# Patient Record
Sex: MALE | Race: WHITE | Employment: OTHER | URBAN - METROPOLITAN AREA
[De-identification: names, ages, dates, MRNs, and addresses within clinical notes are randomized per-mention and may not be internally consistent; named-entity substitution may affect disease eponyms.]

---

## 2017-01-23 ENCOUNTER — HOSPITAL ENCOUNTER (EMERGENCY)
Age: 62
Discharge: HOME | End: 2017-01-23
Attending: EMERGENCY MEDICINE
Payer: MEDICARE

## 2017-01-23 VITALS
OXYGEN SATURATION: 100 % | BODY MASS INDEX: 23.63 KG/M2 | TEMPERATURE: 98.7 F | HEART RATE: 84 BPM | RESPIRATION RATE: 14 BRPM | DIASTOLIC BLOOD PRESSURE: 86 MMHG | SYSTOLIC BLOOD PRESSURE: 124 MMHG | WEIGHT: 160 LBS

## 2017-01-23 DIAGNOSIS — F10.920 ALCOHOL INTOXICATION, UNCOMPLICATED (HCC): Primary | ICD-10-CM

## 2017-01-23 LAB
ALBUMIN SERPL BCP-MCNC: 3.6 G/DL (ref 3.4–5)
ALBUMIN/GLOB SERPL: 0.9 {RATIO} (ref 0.8–1.7)
ALP SERPL-CCNC: 75 U/L (ref 45–117)
ALT SERPL-CCNC: 40 U/L (ref 16–61)
AMPHET UR QL SCN: NEGATIVE
ANION GAP BLD CALC-SCNC: 8 MMOL/L (ref 3–18)
APAP SERPL-MCNC: <2 UG/ML (ref 10–30)
AST SERPL W P-5'-P-CCNC: 39 U/L (ref 15–37)
BARBITURATES UR QL SCN: NEGATIVE
BASOPHILS # BLD AUTO: 0.1 K/UL (ref 0–0.06)
BASOPHILS # BLD: 1 % (ref 0–2)
BENZODIAZ UR QL: NEGATIVE
BILIRUB SERPL-MCNC: 0.1 MG/DL (ref 0.2–1)
BUN SERPL-MCNC: 10 MG/DL (ref 7–18)
BUN/CREAT SERPL: 14 (ref 12–20)
CALCIUM SERPL-MCNC: 8.7 MG/DL (ref 8.5–10.1)
CANNABINOIDS UR QL SCN: NEGATIVE
CHLORIDE SERPL-SCNC: 108 MMOL/L (ref 100–108)
CO2 SERPL-SCNC: 28 MMOL/L (ref 21–32)
COCAINE UR QL SCN: NEGATIVE
CREAT SERPL-MCNC: 0.72 MG/DL (ref 0.6–1.3)
DIFFERENTIAL METHOD BLD: ABNORMAL
EOSINOPHIL # BLD: 0.6 K/UL (ref 0–0.4)
EOSINOPHIL NFR BLD: 8 % (ref 0–5)
ERYTHROCYTE [DISTWIDTH] IN BLOOD BY AUTOMATED COUNT: 15.9 % (ref 11.6–14.5)
ETHANOL SERPL-MCNC: 257 MG/DL (ref 0–3)
GLOBULIN SER CALC-MCNC: 3.8 G/DL (ref 2–4)
GLUCOSE SERPL-MCNC: 116 MG/DL (ref 74–99)
HCT VFR BLD AUTO: 51.8 % (ref 36–48)
HDSCOM,HDSCOM: NORMAL
HGB BLD-MCNC: 18.7 G/DL (ref 13–16)
LYMPHOCYTES # BLD AUTO: 23 % (ref 21–52)
LYMPHOCYTES # BLD: 1.7 K/UL (ref 0.9–3.6)
MCH RBC QN AUTO: 32.9 PG (ref 24–34)
MCHC RBC AUTO-ENTMCNC: 36.1 G/DL (ref 31–37)
MCV RBC AUTO: 91 FL (ref 74–97)
METHADONE UR QL: NEGATIVE
MONOCYTES # BLD: 0.4 K/UL (ref 0.05–1.2)
MONOCYTES NFR BLD AUTO: 6 % (ref 3–10)
NEUTS SEG # BLD: 4.5 K/UL (ref 1.8–8)
NEUTS SEG NFR BLD AUTO: 62 % (ref 40–73)
OPIATES UR QL: NEGATIVE
PCP UR QL: NEGATIVE
PLATELET # BLD AUTO: 172 K/UL (ref 135–420)
PMV BLD AUTO: 9.1 FL (ref 9.2–11.8)
POTASSIUM SERPL-SCNC: 3.5 MMOL/L (ref 3.5–5.5)
PROT SERPL-MCNC: 7.4 G/DL (ref 6.4–8.2)
RBC # BLD AUTO: 5.69 M/UL (ref 4.7–5.5)
SALICYLATES SERPL-MCNC: 3.4 MG/DL (ref 2.8–20)
SODIUM SERPL-SCNC: 144 MMOL/L (ref 136–145)
WBC # BLD AUTO: 7.2 K/UL (ref 4.6–13.2)

## 2017-01-23 PROCEDURE — 80307 DRUG TEST PRSMV CHEM ANLYZR: CPT | Performed by: EMERGENCY MEDICINE

## 2017-01-23 PROCEDURE — 80053 COMPREHEN METABOLIC PANEL: CPT | Performed by: EMERGENCY MEDICINE

## 2017-01-23 PROCEDURE — 85025 COMPLETE CBC W/AUTO DIFF WBC: CPT | Performed by: EMERGENCY MEDICINE

## 2017-01-23 PROCEDURE — 99285 EMERGENCY DEPT VISIT HI MDM: CPT

## 2017-01-23 NOTE — DISCHARGE INSTRUCTIONS
Alcohol, Drug, or Poison Ingestion: Care Instructions  Your Care Instructions  A person can become very sick, or die, from swallowing or using alcohol, drugs, or poisons. Alcohol poisoning occurs when a person drinks a large amount of alcohol. Alcohol can stop nerve signals that control breathing. It can also stop the gag reflex that prevents choking. Alcohol poisoning is serious. It can lead to brain damage or death if it's not treated right away. Drugs can be used by accident or on purpose. They can be swallowed, inhaled, injected, or absorbed through the skin. Drugs include over-the-counter medicine (such as aspirin or acetaminophen) and prescription medicine. They also include vitamins and supplements. And they include illegal drugs such as cocaine and heroin. And poisons are all around us. They include household , cosmetics, houseplants, and garden chemicals. The doctor has checked you carefully, but problems can develop later. If you notice any problems or new symptoms, get medical treatment right away. Follow-up care is a key part of your treatment and safety. Be sure to make and go to all appointments, and call your doctor if you are having problems. It's also a good idea to know your test results and keep a list of the medicines you take. How can you care for yourself at home? Alcohol problems  · Talk to your doctor or counselor about programs that can help you stop using alcohol. · Plan ways to avoid being tempted to drink. ¨ Get rid of all alcohol in your home. ¨ Avoid places where you tend to drink. ¨ Stay away from places or events that offer alcohol. ¨ Stay away from people who drink a lot. Drug problems  · Talk to your doctor about programs that can help you stop using drugs. · Get rid of any drugs you might be tempted to misuse. · Learn how to say no when other people use drugs. · Don't spend time with people who use drugs.   Poison prevention  · Keep products in the containers they came in. Keep them with the original labels. · Be careful when you use cleaning products, paints, solvents, and pesticides. Read labels before use. Use a fan to move strong odors and fumes out of your home. · Do not mix cleaning products. Try to use nontoxic . These include vinegar, lemon juice, and baking soda. When should you call for help? Poison control centers, hospitals, or your doctor can give immediate advice in the case of a poisoning. The [facility] number is [phone]. Have the poison container with you so you can give complete information to the poison control center, such as what the poison or substance is, how much was taken and when. Do not try to make the person vomit. Call 911 anytime you think you may need emergency care. For example, call if you or someone else:  · Has used or currently uses alcohol or drugs and is very confused or can't stay awake. · Has passed out (lost consciousness). · Has severe trouble breathing. · Is having a seizure. Call your doctor now or seek immediate medical care if you or someone else:  · Has new symptoms, or is not acting normally. Watch closely for changes in your health, and be sure to contact your doctor if:  · You do not get better as expected. · You need help with drug or alcohol problems. · You have problems with depression or other mental health issues. Where can you learn more? Go to http://darrick-joanne.info/. Enter Y476 in the search box to learn more about \"Alcohol, Drug, or Poison Ingestion: Care Instructions. \"  Current as of: May 27, 2016  Content Version: 11.1  © 2514-0961 [employer]. Care instructions adapted under license by [employer] (which disclaims liability or warranty for this information).  If you have questions about a medical condition or this instruction, always ask your healthcare professional. [employer] disclaims any warranty or liability for your use of this information.

## 2017-01-23 NOTE — ED TRIAGE NOTES
According to EMS someone called the ambulance because the pt apparently called and left a message stated he wanted to hurt himself, or a note that stated he wanted to hurt himself, pt denies SI or HI at this time, does not remember being picked up by EMS, pt just had surgery 1 week ago on right elbow to reattach a tendon. Pt states he took some pain meds last night and drank some alcohol because he missed his wife who passed in 2011. Pt arrived with wet pants, states he has incontinence. Pt just keeps asking for water over and over again and states he can not answer questions without it, will not talk about what happened this morning.

## 2017-01-23 NOTE — ED PROVIDER NOTES
HPI Comments: 7:26 AM Cassidy Preston is a 59 y.o. male who presents to the ED via EMS for SI. Per EMS, patient called his brother in law, and made the comment that he wanted to harm himself. His brother in law then called the patient's sister to come over to the house. When the sister arrived she decided to call 911. She states that the patient had been drinking a good amount of alcohol, and pills of his Oxybutynin were spilled everywhere. Patient states that he does not know why he is in the ED. \"I woke up with a bunch of people in my house, next thing I know I'm on a bumpy ride coming here\". Mentions that when he arrived to the ED \"my pants were wet\", because I have urinary incontinence\". Patient has a history of bladder cancer, HTN, and neuropathy. Patient is noncompliant with his HTN medications, but notes that Tramadol helps with his neuropathy. Patient denies SI or HI. States \"I would like to have my baby with me, I just miss my baby\". Patient is referring to his wife that  in . Reports that he was just having a bad night, and that nothing is wrong. Other sx include R elbow pain secondary to a recent surgery. No further complaints at this time. The history is provided by the patient and a relative.         Past Medical History:   Diagnosis Date    Acute sinusitis, unspecified     Alcohol abuse, unspecified     Bladder cancer ([facility] [address]      chemo & radiation    Cough     Depressive disorder     Hematuria     HLD (hyperlipidemia)     Impotence of organic origin     Insomnia, unspecified     Other abnormal glucose     Other seborrheic keratosis     Pain in joint, shoulder region        Past Surgical History:   Procedure Laterality Date    Hx colonoscopy      Pr cystourethroscopy,biopsies      Pr cystourethroscopy,biopsies  2014     Dr. Mervin Akers Hx urological  13     [facility], TURBT - low-grade stage TA, Dr Wero Cervantes Hx urological  14     [facility], Cysto-TURP, Transurethral resection/multiple bladder biopsies - Papillary Urothelial Hyperplasia, Dr Rob Person Hx urological  1/29/15     Dr. Taylor Morales - clot evacuation/fulguration         Family History:   Problem Relation Age of Onset    Hypertension Mother     Kidney Disease Father     Heart Disease Father     Diabetes Sister     Hypertension Brother     Stroke Brother        Social History     Social History    Marital status:      Spouse name: N/A    Number of children: N/A    Years of education: N/A     Occupational History    Not on file. Social History Main Topics    Smoking status: Current Some Day Smoker     Packs/day: 1.00     Years: 43.00     Types: Cigarettes    Smokeless tobacco: Never Used      Comment: using electronic cigarettes & nicotine patch    Alcohol use 0.0 oz/week      Comment: occassional    Drug use: No    Sexual activity: Not on file     Other Topics Concern    Not on file     Social History Narrative         ALLERGIES: Codeine; Other plant, animal, environmental; Oxycodone; Percocet [oxycodone-acetaminophen]; Pollen extracts; Pravachol [pravastatin]; Vicodin [hydrocodone-acetaminophen]; and Zoloft [sertraline]    Review of Systems   Constitutional: Negative for chills and fever. HENT: Negative for congestion and sneezing. Eyes: Negative for visual disturbance. Respiratory: Negative for cough and shortness of breath. Cardiovascular: Negative for chest pain. Gastrointestinal: Negative for abdominal pain, nausea and vomiting. Genitourinary: Negative for difficulty urinating and dysuria. (+) chronic urinary incontinence   Musculoskeletal: Negative for back pain. (+) R elbow pain    Skin: Negative for rash. Neurological: Negative for weakness and headaches. Psychiatric/Behavioral: Negative for suicidal ideas.         (-) HI       Vitals:    01/23/17 0643   BP: 124/86   Pulse: 84   Resp: 14   Temp: 98.7 °F (37.1 °C)   SpO2: 100%   Weight: 72.6 kg (160 lb)            Physical Exam   Constitutional: He is oriented to person, place, and time. He appears well-developed and well-nourished. HENT:   Head: Normocephalic and atraumatic. Eyes: EOM are normal.   Neck: Neck supple. No JVD present. Cardiovascular: Normal rate and regular rhythm. Pulmonary/Chest: Effort normal and breath sounds normal. No respiratory distress. Abdominal: Soft. He exhibits no distension. There is no tenderness. There is no rebound and no guarding. Musculoskeletal:   R elbow with steri strips in place, held in flexion   Neurological: He is alert and oriented to person, place, and time. No cranial nerve deficit. Skin: Skin is warm and dry. No erythema. Psychiatric: Judgment normal.   Denies SI or HI        MDM  Number of Diagnoses or Management Options  Diagnosis management comments: 60 y/o male presents for eval of etoh and SI. Check basic labs,   At present time denies SI, will eval when sober, still appears mildly intoxicated. Amount and/or Complexity of Data Reviewed  Clinical lab tests: ordered and reviewed      ED Course       Procedures    Vitals:  Patient Vitals for the past 12 hrs:   Temp Pulse Resp BP SpO2   01/23/17 0643 98.7 °F (37.1 °C) 84 14 124/86 100 %     100% on RA, indicating adequate oxygenation.       Lab findings:  Recent Results (from the past 12 hour(s))   DRUG SCREEN, URINE    Collection Time: 01/23/17  7:50 AM   Result Value Ref Range    BENZODIAZEPINE NEGATIVE  NEG      BARBITURATES NEGATIVE  NEG      THC (TH-CANNABINOL) NEGATIVE  NEG      OPIATES NEGATIVE  NEG      PCP(PHENCYCLIDINE) NEGATIVE  NEG      COCAINE NEGATIVE  NEG      AMPHETAMINE NEGATIVE  NEG      METHADONE NEGATIVE  NEG      HDSCOM (NOTE)    CBC WITH AUTOMATED DIFF    Collection Time: 01/23/17  9:00 AM   Result Value Ref Range    WBC 7.2 4.6 - 13.2 K/uL    RBC 5.69 (H) 4.70 - 5.50 M/uL    HGB 18.7 (H) 13.0 - 16.0 g/dL    HCT 51.8 (H) 36.0 - 48.0 %    MCV 91.0 74.0 - 97.0 FL    MCH 32.9 24.0 - 34.0 PG    MCHC 36.1 31.0 - 37.0 g/dL    RDW 15.9 (H) 11.6 - 14.5 %    PLATELET 137 137 - 712 K/uL    MPV 9.1 (L) 9.2 - 11.8 FL    NEUTROPHILS 62 40 - 73 %    LYMPHOCYTES 23 21 - 52 %    MONOCYTES 6 3 - 10 %    EOSINOPHILS 8 (H) 0 - 5 %    BASOPHILS 1 0 - 2 %    ABS. NEUTROPHILS 4.5 1.8 - 8.0 K/UL    ABS. LYMPHOCYTES 1.7 0.9 - 3.6 K/UL    ABS. MONOCYTES 0.4 0.05 - 1.2 K/UL    ABS. EOSINOPHILS 0.6 (H) 0.0 - 0.4 K/UL    ABS. BASOPHILS 0.1 (H) 0.0 - 0.06 K/UL    DF AUTOMATED     METABOLIC PANEL, COMPREHENSIVE    Collection Time: 01/23/17  9:00 AM   Result Value Ref Range    Sodium 144 136 - 145 mmol/L    Potassium 3.5 3.5 - 5.5 mmol/L    Chloride 108 100 - 108 mmol/L    CO2 28 21 - 32 mmol/L    Anion gap 8 3.0 - 18 mmol/L    Glucose 116 (H) 74 - 99 mg/dL    BUN 10 7.0 - 18 MG/DL    Creatinine 0.72 0.6 - 1.3 MG/DL    BUN/Creatinine ratio 14 12 - 20      GFR est AA >60 >60 ml/min/1.73m2    GFR est non-AA >60 >60 ml/min/1.73m2    Calcium 8.7 8.5 - 10.1 MG/DL    Bilirubin, total 0.1 (L) 0.2 - 1.0 MG/DL    ALT 40 16 - 61 U/L    AST 39 (H) 15 - 37 U/L    Alk. phosphatase 75 45 - 117 U/L    Protein, total 7.4 6.4 - 8.2 g/dL    Albumin 3.6 3.4 - 5.0 g/dL    Globulin 3.8 2.0 - 4.0 g/dL    A-G Ratio 0.9 0.8 - 1.7     SALICYLATE    Collection Time: 01/23/17  9:00 AM   Result Value Ref Range    SALICYLATE 3.4 2.8 - 60.4 MG/DL   ACETAMINOPHEN    Collection Time: 01/23/17  9:00 AM   Result Value Ref Range    ACETAMINOPHEN <2 (L) 10 - 30 ug/mL   ETHYL ALCOHOL    Collection Time: 01/23/17  9:00 AM   Result Value Ref Range    ALCOHOL(ETHYL),SERUM 257 (H) 0 - 3 MG/DL         Progress notes, Consult notes or additional Procedure notes:   Noted elevated etoh level. Will re-eval when sober at 2pm.     210 PM. Pt alert, answering all questions. Denies SI or HI, denies any complaints, requesting DC home. Reevaluation of patient:   2:06 PM  I have reassessed the patient. Patient was discharged in stable condition.   Patient is to return to emergency department if any new or worsening condition. Disposition:  Diagnosis:   1. Alcohol intoxication, uncomplicated ([address]        Disposition: Discharge    Follow-up Information     Follow up With Details Comments Contact Annita Brunson MD Go in 3 days For ED visit follow up [address]      [facility]  As needed, If symptoms worsen [address] [facility]  [phone]           Patient's Medications   Start Taking    No medications on file   Continue Taking    ALPRAZOLAM (XANAX PO)    Take 1 mg by mouth as needed. GABAPENTIN (NEURONTIN) 600 MG TABLET    Take  by mouth three (3) times daily. METHOCARBAMOL (ROBAXIN) 500 MG TABLET    Take 500 mg by mouth four (4) times daily as needed. Indications: MUSCLE SPASM    OXYBUTYNIN CHLORIDE XL (DITROPAN XL) 10 MG CR TABLET    Take 1 Tab by mouth daily. TRAMADOL (ULTRAM) 50 MG TABLET    Take 1 Tab by mouth every six (6) hours as needed for Pain. These Medications have changed    No medications on file   Stop Taking    No medications on file       SCRIBE ATTESTATION STATEMENT  Documented by: Marjorie Mcgee scribing for, and in the presence of, Debra Hyman DO 7:50 AM     Signed by: Keerthi Orta,1/23/17, 7:50 AM    PROVIDER ATTESTATION STATEMENT  I personally performed the services described in the documentation, reviewed the documentation, as recorded by the scribe in my presence, and it accurately and completely records my words and actions.   Debra Hyman DO

## 2017-02-07 ENCOUNTER — HOSPITAL ENCOUNTER (OUTPATIENT)
Dept: PHYSICAL THERAPY | Age: 62
Discharge: HOME | End: 2017-02-07
Payer: MEDICARE

## 2017-02-07 PROCEDURE — G8984 CARRY CURRENT STATUS: HCPCS

## 2017-02-07 PROCEDURE — 97162 PT EVAL MOD COMPLEX 30 MIN: CPT

## 2017-02-07 PROCEDURE — 97110 THERAPEUTIC EXERCISES: CPT

## 2017-02-07 PROCEDURE — G8985 CARRY GOAL STATUS: HCPCS

## 2017-02-07 NOTE — PROGRESS NOTES
PT DAILY TREATMENT NOTE - [facility]     Patient Name: Luis Enrique Rubalcava  Date:2017  : 1955  [x]  Patient  Verified  [place] / Plan: [place] MED[place]ARE PART A & B / Product Type: Medicare /    In time:1005  Out time:1050  Total Treatment Time (min): 45  Total Timed Codes (min): 23  1:1 Treatment Time ( only): 45   Visit #: 1 of 10-12    Treatment Area: Elbow pain [M25.529]    SUBJECTIVE  Pain Level (0-10 scale): 2/10  Any medication changes, allergies to medications, adverse drug reactions, diagnosis change, or new procedure performed?: [x] No    [] Yes (see summary sheet for update)  Subjective functional status/changes:   [] No changes reported  See eval    OBJECTIVE    22 min []Eval                  []Re-Eval       23 min Therapeutic Exercise:  [] See flow sheet :   Rationale: increase ROM to improve the patients ability to ease with ADL's. With   [] TE   [] TA   [] neuro   [] other: Patient Education: [x] Review HEP    [] Progressed/Changed HEP based on:   [] positioning   [] body mechanics   [] transfers   [] heat/ice application    [] other:      Other Objective/Functional Measures: see eval     Pain Level (0-10 scale) post treatment: 2/10    ASSESSMENT/Changes in Function: see POC    Patient will continue to benefit from skilled PT services to modify and progress therapeutic interventions, address functional mobility deficits, address ROM deficits, address strength deficits, analyze and address soft tissue restrictions, analyze and cue movement patterns and assess and modify postural abnormalities to attain remaining goals.      [x]  See Plan of Care  []  See progress note/recertification  []  See Discharge Summary         Progress towards goals / Updated goals:  See eval    PLAN  [x]  Upgrade activities as tolerated     [x]  Continue plan of care  []  Update interventions per flow sheet       []  Discharge due to:_  []  Other:_      Ag Verma, PT 2017  10:58 AM    Future Appointments  Date Time Provider Goran Tory   2/8/2017 1:45 PM Kaz Cantu  [address]

## 2017-02-08 NOTE — PROGRESS NOTES
[facility] - Abie Cordon  [address] [facility]  [phone] fax    Plan of Care/ Statement of Necessity for Physical Therapy Services    Patient name: Cassidy Preston Start of Care: 2017   Referral source: Gerhardt Fix, MD : 1955    Medical Diagnosis: Elbow pain [M25.529]   Onset Date:17    Treatment Diagnosis: R Elbow   Prior Hospitalization: see medical history Provider#: [id number]   Medications: Verified on Patient summary List    Comorbidities: Bladder Cancer, Alcohol Use/Abuse, Smoker,  Depression, Peripheral Neuropathy, Hearing Impaired. 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 Pt is right handed. The Plan of Care and following information is based on the information from the initial evaluation. Assessment/ key information: Pt is a 57 yr old male who reported he had a Right Lateral Epicondyle Tendon Repair 17 due to a full thickness tear. Lateral epicondyle TendonTorn from apparently helping a friend move furniture. He reports wearing a sling for 4 weeks after repair and now has weakness and pain to his arm and elbow. There is very mild swelling observed in his fingers and incision is closed with sensation intact. Pt reports his pain is 2/10 today. He also exhibits poor posture with protracted shoulders and reports neck pain from wearing the sling. ROM for R elbow extension Active=21 deg, Passive ext=34 deg with painful end feel. Supination/Pronation, wrist flexion and extension grossly 4-/5. Pt is right hand dominant and wants to return to car restoration hobbies ASAP. Pt will benefit from skilled therapy to improve ROM, reduce pain, increase strength and function to improve QOL and return to PLOF.         Evaluation Complexity History HIGH Complexity :3+ comorbidities / personal factors will impact the outcome/ POC ; Examination MEDIUM Complexity : 3 Standardized tests and measures addressing body structure, function, activity limitation and / or participation in recreation  ;Presentation MEDIUM Complexity : Evolving with changing characteristics  ; Clinical Decision Making MEDIUM Complexity : FOTO score of 26-74  Overall Complexity Rating: MEDIUM  Problem List: pain affecting function, decrease ROM, decrease strength, edema affecting function, decrease ADL/ functional abilitiies, decrease activity tolerance and decrease flexibility/ joint mobility   Treatment Plan may include any combination of the following: Therapeutic exercise, Therapeutic activities, Neuromuscular re-education, Physical agent/modality, Gait/balance training, Manual therapy, Patient education, Self Care training and Functional mobility training  Patient / Family readiness to learn indicated by: asking questions, trying to perform skills and interest  Persons(s) to be included in education: patient (P)  Barriers to Learning/Limitations: None  Patient Goal (s): Return to full use  Patient Self Reported Health Status: good  Rehabilitation Potential: good    Short Term Goals: To be accomplished in 1 weeks:   Pt will be compliant with HEP to improve function. Long Term Goals: To be accomplished in 6 weeks:   Pt will increase FOTO score by 10pts to improve function. Pt will increase right elbow extension to at least 10 deg active extension to return to functional activities. 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 Pt will report pain at worst <3/10 during light activity. Frequency / Duration: Patient to be seen  2 times per week for 4-6 weeks. Patient/ Caregiver education and instruction: Diagnosis, prognosis, self care, activity modification and exercises   [x]  Plan of care has been reviewed with [facility]    G-Codes (GP)  Carry   Current  CL= 60-79%    Goal  CK= 40-59%  The severity rating is based on clinical judgment.     Certification Period: 2/7/17-5/6/17  Angelica Alexis, PT 2/7/2017 10:17 PM    ________________________________________________________________________    I certify that the above Therapy Services are being furnished while the patient is under my care. I agree with the treatment plan and certify that this therapy is necessary.     Physician's Signature:____________________  Date:____________Time: _________    Please sign and return to [facility] - [address] [place]  [phone] fax

## 2017-02-09 ENCOUNTER — HOSPITAL ENCOUNTER (OUTPATIENT)
Dept: PHYSICAL THERAPY | Age: 62
Discharge: HOME | End: 2017-02-09
Payer: MEDICARE

## 2017-02-09 PROCEDURE — 97110 THERAPEUTIC EXERCISES: CPT

## 2017-02-09 NOTE — PROGRESS NOTES
PT DAILY TREATMENT NOTE - [facility]     Patient Name: Cheryal Rinne  Date:2017  : 1955  [x]  Patient  Verified  Payor: [place] MEDICARE / Plan: [place] MEDICARE PART A & B / Product Type: Medicare /    In time: 12:30  Out time: 1:00  Total Treatment Time (min): 30  Total Timed Codes (min): 30  1:1 Treatment Time ([facility] only): 30   Visit #: 2 of 10-12    Treatment Area: Elbow pain [M25.529]    SUBJECTIVE  Pain Level (0-10 scale): 2/10  Any medication changes, allergies to medications, adverse drug reactions, diagnosis change, or new procedure performed?: [x] No    [] Yes (see summary sheet for update)  Subjective functional status/changes:   [] No changes reported  Pt reports wanting to get back to working on his 69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 Pt doesn't want to reinjure himself but also wants to get back to using his arm again. Pt notes some popping in elbow when getting up from bed. Pt reports using remote control for exercises currently. OBJECTIVE    30 min Therapeutic Exercise:  [x] See flow sheet :   Rationale: increase ROM and increase strength to improve the patients ability to improve use of R UE for working on cars          With   [] TE   [] TA   [] neuro   [] other: Patient Education: [x] Review HEP    [] Progressed/Changed HEP based on:   [] positioning   [] body mechanics   [] transfers   [] heat/ice application    [] other:      Other Objective/Functional Measures:   AROM ext 14 degrees from 0  Cues with supination/pronation to no IR or ER shoulder  Good stretch felt with prayer and reverse stretch  R shoulder holds in IR  Forward shoulder posture     Pain Level (0-10 scale) post treatment: 2/10    ASSESSMENT/Changes in Function: Pt compliant with initial HEP from [facility].E. Pt continues to have decreased elbow extension due to prolonged sling use following surgery. Pt has some compensation with supination/pronation using shoulder ER/IR. Pt has forward shoulder posture and tenderness along the lateral elbow.  Will continue working on mobility and gentle strengthening for return to work on cars. Patient will continue to benefit from skilled PT services to modify and progress therapeutic interventions, address functional mobility deficits, address ROM deficits, address strength deficits, analyze and address soft tissue restrictions, analyze and cue movement patterns, analyze and modify body mechanics/ergonomics, assess and modify postural abnormalities and instruct in home and community integration to attain remaining goals. Progress towards goals / Updated goals:  Short Term Goals: To be accomplished in 1 weeks:  Pt will be compliant with HEP to improve function. Met  Long Term Goals: To be accomplished in 6 weeks:  Pt will increase FOTO score by 10pts to improve function. Pt will increase right elbow extension to at least 10 deg active extension to return to functional activities. 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 Pt will report pain at worst <3/10 during light activity.     PLAN  [x]  Upgrade activities as tolerated     [x]  Continue plan of care  []  Update interventions per flow sheet       []  Discharge due to:_  []  Other:_      Betzy Mccoy PTA 2/9/2017  10:24 AM    Future Appointments  Date Time Provider Goran Spears   2/9/2017 12:30 PM Betzy Mccoy PTA [facility] [address]   2/14/2017 1:00 PM Betzy Mccoy PTA [facility] [address]   2/16/2017 12:00 PM Harman Hassan PT [facility] [address]   2/21/2017 11:30 AM Betzy Mccoy PTA [facility] [address]   2/23/2017 11:30 AM Betzy Mccoy PTA [facility] [address]   2/28/2017 11:30 AM Betzy Mccoy PTA [facility] [address]   3/2/2017 11:30 AM Betzy Mccoy PTA [facility] [address]   3/7/2017 11:30 AM Harman Hassan PT [facility] [address]   3/9/2017 11:30 AM Harman Hassan PT [facility] [address]   6/7/2017 11:45 AM Gennaro Calloway MD [facility] [facility]

## 2017-02-14 ENCOUNTER — HOSPITAL ENCOUNTER (OUTPATIENT)
Dept: PHYSICAL THERAPY | Age: 62
Discharge: HOME | End: 2017-02-14
Payer: MEDICARE

## 2017-02-14 PROCEDURE — 97110 THERAPEUTIC EXERCISES: CPT

## 2017-02-14 NOTE — PROGRESS NOTES
PT DAILY TREATMENT NOTE - [facility]     Patient Name: Anthony Tineo  Date:2017  : 1955  [x]  Patient  Verified  Payor: [place] MEDICARE / Plan: VA MEDICARE PART A & B / Product Type: Medicare /    In time:1:00 Out time:1:40  Total Treatment Time (min): 40  Total Timed Codes (min): 40  1:1 Treatment Time ( only): 32  Visit #: 3 of 10-12    Treatment Area: Elbow pain [M25.529]    SUBJECTIVE  Pain Level (0-10 scale): 3/10  Any medication changes, allergies to medications, adverse drug reactions, diagnosis change, or new procedure performed?: [x] No    [] Yes (see summary sheet for update)  Subjective functional status/changes:   [] No changes reported  Pt reports 3/10 pain all throughout arm because of HEP and still has trouble with supination. Notes throbbing in the elbow over the weekend. Pt reports he will ice it when he gets home. OBJECTIVE    38 min Therapeutic Exercise:  [x] See flow sheet :   Rationale: increase ROM and increase strength to improve the patients ability to improve use of R UE for working on cars    2 minutes of TPR to R brachialis for improvement in elbow extension          With   [] TE   [] TA   [] neuro   [] other: Patient Education: [x] Review HEP    [] Progressed/Changed HEP based on:   [] positioning   [] body mechanics   [] transfers   [] heat/ice application    [] other:      Other Objective/Functional Measures:   R elbow Extension AROM: 30 degrees  PROM 22 degrees with pain in lateral elbow   Unable to complete hand  exercise due to weakness and pain   Cues to maintain good posture during seated exercises   Cues to only use wrist for wrist maze not shoulder     Pain Level (0-10 scale) post treatment: 3+/10    ASSESSMENT/Changes in Function: Pt continues to lack full elbow extension on the left by about 30 degrees actively. Pt with fluctuating pain levels depending on activity and has increased pain with exercises.  Will continue to progress strength and mobility of R elbow and wrist to improve ease of working on cars with decreased pain. Patient will continue to benefit from skilled PT services to modify and progress therapeutic interventions, address functional mobility deficits, address ROM deficits, address strength deficits, analyze and address soft tissue restrictions, analyze and cue movement patterns, analyze and modify body mechanics/ergonomics, assess and modify postural abnormalities and instruct in home and community integration to attain remaining goals. Progress towards goals / Updated goals:  Short Term Goals: To be accomplished in 1 weeks:  Pt will be compliant with HEP to improve function. Met  Long Term Goals: To be accomplished in 6 weeks:  Pt will increase FOTO score by 10pts to improve function. Pt will increase right elbow extension to at least 10 deg active extension to return to functional activities. 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 Pt will report pain at worst <3/10 during light activity.  Continues with 3/10 (2/14/17)    PLAN  [x]  Upgrade activities as tolerated     [x]  Continue plan of care  []  Update interventions per flow sheet       []  Discharge due to:_  []  Other:_      Yesenia Sanchez PTA 2/14/2017  9:32 AM    Future Appointments  Date Time Provider Goran Spears   2/14/2017 1:00 PM Yesenia Sanchez PTA [facility]   2/16/2017 12:00 PM Boone Moreno PT [facility]   2/21/2017 11:30 AM Yesenia Sanchez PTA [facility]   2/23/2017 11:30 AM Yesenia Sanchez PTA [facility]   2/28/2017 11:30 AM Yesenia Sanchez PTA [facility]   3/2/2017 11:30 AM Yesenia Sanchez PTA [facility]   3/7/2017 11:30 AM Boone Moreno PT [facility]   3/9/2017 11:30 AM Boone Moreno PT [facility]   6/7/2017 11:45 AM Bernie Mendoza MD [address] [place] [address]

## 2017-02-16 ENCOUNTER — HOSPITAL ENCOUNTER (OUTPATIENT)
Dept: PHYSICAL THERAPY | Age: 62
Discharge: HOME | End: 2017-02-16
Payer: MEDICARE

## 2017-02-16 PROCEDURE — 97140 MANUAL THERAPY 1/> REGIONS: CPT

## 2017-02-16 PROCEDURE — 97110 THERAPEUTIC EXERCISES: CPT

## 2017-02-16 NOTE — PROGRESS NOTES
PT DAILY TREATMENT NOTE - [facility] 3-16    Patient Name: Sri Lujan  Date:2017  : 1955  [x]  Patient  Verified  Payor: Dino Signs / Plan: VA MEDICARE PART A & B / Product Type: Medicare /    In time:12:00  Out time:12:33  Total Treatment Time (min): 33  Total Timed Codes (min): 33  1:1 Treatment Time ( only): 33   Visit #: 4 of 10-12    Treatment Area: Elbow pain [M25.529]    SUBJECTIVE  Pain Level (0-10 scale): 2  Any medication changes, allergies to medications, adverse drug reactions, diagnosis change, or new procedure performed?: [x] No    [] Yes (see summary sheet for update)  Subjective functional status/changes:   [x] No changes reported      OBJECTIVE  25 min Therapeutic Exercise:  [x] See flow sheet :   Rationale: increase ROM, increase strength and improve coordination to improve the patients ability to perform functional tasks    8 min Manual Therapy:  PROM to L elbow. Contract/relax technique to increase elbow extension   Rationale: decrease pain, increase ROM and increase tissue extensibility to increase ease of ADLs        With   [] TE   [] TA   [] neuro   [] other: Patient Education: [x] Review HEP    [] Progressed/Changed HEP based on:   [] positioning   [] body mechanics   [] transfers   [] heat/ice application    [] other:      Other Objective/Functional Measures: Added 1/2 T/S ext seated 3\"x10  Added doorway stretch 2x30\"    Pain Level (0-10 scale) post treatment: 2 and 1/2-3/10    ASSESSMENT/Changes in Function: Patient tolerates session fairly well, pain increases slightly only with gripper exercise. Will continue to progress strength and mobility of R elbow and wrist for return to working on cars with decreased pain.      Patient will continue to benefit from skilled PT services to modify and progress therapeutic interventions, address functional mobility deficits, address ROM deficits, address strength deficits, analyze and address soft tissue restrictions, analyze and cue movement patterns, analyze and modify body mechanics/ergonomics and assess and modify postural abnormalities to attain remaining goals. []  See Plan of Care  []  See progress note/recertification  []  See Discharge Summary           Progress towards goals / Updated goals:  Short Term Goals: To be accomplished in 1 weeks:  Pt will be compliant with HEP to improve function. Met  Long Term Goals: To be accomplished in 6 weeks:  Pt will increase FOTO score by 10pts to improve function. Pt will increase right elbow extension to at least 10 deg active extension to return to functional activities. 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 Pt will report pain at worst <3/10 during light activity.  Continues with 3/10 (2/14/17)    PLAN  []  Upgrade activities as tolerated     [x]  Continue plan of care  []  Update interventions per flow sheet       []  Discharge due to:_  []  Other:_      Tarah Singh 2/16/2017  12:09 PM    Future Appointments  Date Time Provider Goran Spears   2/21/2017 11:30 AM Keyon Caldwell PTA [facility]   2/23/2017 11:30 AM Keyon Caldwell PTA [facility]   2/28/2017 11:30 AM Keyon Caldwell PTA [facility]   3/2/2017 11:30 AM Keyon Caldwell PTA [facility]   3/7/2017 11:30 AM Ese Lozano, PT [facility]   3/9/2017 11:30 AM Ese Lozano PT [facility]   6/7/2017 11:45 AM Christo Bazzi MD [address] [place] [address]

## 2017-02-21 ENCOUNTER — APPOINTMENT (OUTPATIENT)
Dept: PHYSICAL THERAPY | Age: 62
End: 2017-02-21
Payer: MEDICARE

## 2017-02-23 ENCOUNTER — HOSPITAL ENCOUNTER (OUTPATIENT)
Dept: PHYSICAL THERAPY | Age: 62
Discharge: HOME | End: 2017-02-23
Payer: MEDICARE

## 2017-02-23 PROCEDURE — 97110 THERAPEUTIC EXERCISES: CPT

## 2017-02-23 NOTE — PROGRESS NOTES
PT DAILY TREATMENT NOTE - [facility]     Patient Name: Ezequiel Bryant  Date:2017  : 1955  [x]  Patient  Verified  Payor: Charlie Drafts / Plan: [place] MEDICARE PART A & B / Product Type: Medicare /    In time:11:35  Out time: 12:15  Total Treatment Time (min): 40  Total Timed Codes (min): 40  1:1 Treatment Time ( only): 28  Visit #: 5 of 10-12    Treatment Area: Elbow pain [M25.529]    SUBJECTIVE  Pain Level (0-10 scale): 3/10  Any medication changes, allergies to medications, adverse drug reactions, diagnosis change, or new procedure performed?: [x] No    [] Yes (see summary sheet for update)  Subjective functional status/changes:   [] No changes reported  Pt reports continued pain on the side of his R elbow that increased with moving his arm to work on his car or lift heavier items. Pt thinks he goes back to the MD 3/11 to follow up but was only originally given a 5# lifting restriction. Pt states he did clean his car over the weekend and waxed it but mainly used his L arm. OBJECTIVE    40 min Therapeutic Exercise:  [x] See flow sheet :   Rationale: increase ROM, increase strength and improve coordination to improve the patients ability to improve ease of working on cars          With   [x] TE   [] TA   [] neuro   [] other: Patient Education: [x] Review HEP    [] Progressed/Changed HEP based on:   [] positioning   [] body mechanics   [] transfers   [] heat/ice application    [x] other: Educated pt he would be placed on hold to work on HEP independently until follow up with MD; pt to call and get back on schedule sooner if pain should arise. Pt to start with light weight for bicep curls and only increase weight if he doesn't have increased pain and gets clearance for higher weight from MD.      Other Objective/Functional Measures:    FOTO:  46  Continues to be tender along lateral epicondyle  Pt had no questions concerning HEP prior to being placed on hold  Educated about muscle he had repaired and it's function regarding the wrist  PT agreeable with change to POC verbally to put pt on hold until follow up with MD    Pain Level (0-10 scale) post treatment: 1/10    ASSESSMENT/Changes in Function: Pt making good progress towards initial goals in therapy. Pt independent with HEP and home progression to continue working on elbow ROM and strength. Pt continues to get pain with extreme motions and heavy lifting when working on cars. Will place pt on hold at this time to independently work on HEP prior to follow up with MD.      Patient will continue to benefit from skilled PT services to modify and progress therapeutic interventions, address functional mobility deficits, address ROM deficits, address strength deficits, analyze and address soft tissue restrictions, analyze and cue movement patterns, analyze and modify body mechanics/ergonomics, assess and modify postural abnormalities, address imbalance/dizziness and instruct in home and community integration to attain remaining goals. Progress towards goals / Updated goals:  Short Term Goals: To be accomplished in 1 weeks:  Pt will be compliant with HEP to improve function. Met  Long Term Goals: To be accomplished in 6 weeks:  Pt will increase FOTO score by 10pts to improve function. Met  Pt will increase right elbow extension to at least 10 deg active extension to return to functional activities. 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 Pt will report pain at worst <3/10 during light activity.  Continues with 3/10 (2/14/17)    PLAN  [x]  Upgrade activities as tolerated     [x]  Continue plan of care  []  Update interventions per flow sheet       []  Discharge due to:_  []  Other:_      Liz Villareal PTA 2/23/2017  9:23 AM    Future Appointments  Date Time Provider Goran Spears   2/23/2017 11:30 AM Liz Villareal PTA [facility]   2/28/2017 11:30 AM Sheilda Castleman, PTA [facility]   3/2/2017 11:30 AM Sheilda Castleman, PTA [facility]   3/7/2017 11:30 AM Emilie Cadlwell, PT [facility]   3/9/2017 11:30 AM Ag Verma, PT [facility]   6/7/2017 11:45 AM Jeannie Ching MD [facility]

## 2017-02-28 ENCOUNTER — APPOINTMENT (OUTPATIENT)
Dept: PHYSICAL THERAPY | Age: 62
End: 2017-02-28
Payer: MEDICARE

## 2017-03-02 ENCOUNTER — APPOINTMENT (OUTPATIENT)
Dept: PHYSICAL THERAPY | Age: 62
End: 2017-03-02

## 2017-03-07 ENCOUNTER — APPOINTMENT (OUTPATIENT)
Dept: PHYSICAL THERAPY | Age: 62
End: 2017-03-07

## 2017-03-09 ENCOUNTER — APPOINTMENT (OUTPATIENT)
Dept: PHYSICAL THERAPY | Age: 62
End: 2017-03-09

## 2017-06-19 ENCOUNTER — HOSPITAL ENCOUNTER (OUTPATIENT)
Dept: CT IMAGING | Age: 62
Discharge: HOME | End: 2017-06-19
Attending: UROLOGY
Payer: MEDICARE

## 2017-06-19 DIAGNOSIS — C67.9 MALIGNANT NEOPLASM OF URINARY BLADDER, UNSPECIFIED SITE (HCC): ICD-10-CM

## 2017-06-19 LAB — CREAT UR-MCNC: 0.7 MG/DL (ref 0.6–1.3)

## 2017-06-19 PROCEDURE — 82565 ASSAY OF CREATININE: CPT

## 2017-06-19 PROCEDURE — 74178 CT ABD&PLV WO CNTR FLWD CNTR: CPT

## 2017-06-19 PROCEDURE — 74011636320 HC RX REV CODE- 636/320: Performed by: UROLOGY

## 2017-06-19 RX ADMIN — IOPAMIDOL 100 ML: 612 INJECTION, SOLUTION INTRAVENOUS at 13:38

## 2017-08-09 ENCOUNTER — HOSPITAL ENCOUNTER (OUTPATIENT)
Age: 62
Discharge: HOME | End: 2017-08-09
Attending: ORTHOPAEDIC SURGERY
Payer: MEDICARE

## 2017-08-09 DIAGNOSIS — M77.11 RIGHT LATERAL EPICONDYLITIS: ICD-10-CM

## 2017-08-09 PROCEDURE — 73221 MRI JOINT UPR EXTREM W/O DYE: CPT

## 2017-08-28 ENCOUNTER — HOSPITAL ENCOUNTER (OUTPATIENT)
Dept: GENERAL RADIOLOGY | Age: 62
Discharge: HOME | End: 2017-08-28
Payer: MEDICARE

## 2017-08-28 DIAGNOSIS — R10.9 ABDOMINAL PAIN, UNSPECIFIED SITE: ICD-10-CM

## 2017-08-28 PROCEDURE — 74000 XR ABD (KUB): CPT

## 2017-08-28 PROCEDURE — 71100 X-RAY EXAM RIBS UNI 2 VIEWS: CPT

## 2017-10-06 ENCOUNTER — HOSPITAL ENCOUNTER (OUTPATIENT)
Dept: CT IMAGING | Age: 62
Discharge: HOME | End: 2017-10-06
Attending: UROLOGY
Payer: MEDICARE

## 2017-10-06 DIAGNOSIS — C67.9 MALIGNANT NEOPLASM OF URINARY BLADDER, UNSPECIFIED SITE (HCC): ICD-10-CM

## 2017-10-06 LAB — CREAT UR-MCNC: 0.7 MG/DL (ref 0.6–1.3)

## 2017-10-06 PROCEDURE — 74011636320 HC RX REV CODE- 636/320: Performed by: UROLOGY

## 2017-10-06 PROCEDURE — 74178 CT ABD&PLV WO CNTR FLWD CNTR: CPT

## 2017-10-06 PROCEDURE — 82565 ASSAY OF CREATININE: CPT

## 2017-10-06 RX ADMIN — IOPAMIDOL 100 ML: 612 INJECTION, SOLUTION INTRAVENOUS at 16:00

## 2017-10-23 ENCOUNTER — HOSPITAL ENCOUNTER (OUTPATIENT)
Dept: CT IMAGING | Age: 62
Discharge: HOME | End: 2017-10-23
Attending: UROLOGY
Payer: MEDICARE

## 2017-10-23 DIAGNOSIS — R91.1 LUNG NODULE SEEN ON IMAGING STUDY: ICD-10-CM

## 2017-10-23 DIAGNOSIS — K76.9 LIVER LESION: ICD-10-CM

## 2017-10-23 PROCEDURE — 71250 CT THORAX DX C-: CPT

## 2017-10-23 PROCEDURE — 74170 CT ABD WO CNTRST FLWD CNTRST: CPT

## 2017-10-23 PROCEDURE — 74011636320 HC RX REV CODE- 636/320: Performed by: UROLOGY

## 2017-10-23 RX ADMIN — IOPAMIDOL 100 ML: 612 INJECTION, SOLUTION INTRAVENOUS at 15:00

## 2017-10-23 NOTE — LETTER
11/9/2017 4:22 PM 
 
Mr. Luis Enrique Rubalcava [address] [address] Dear Luis Enrique Rubalcava: 
 
Please find your most recent results below. Resulted Orders CT CHEST WO CONT Narrative CT scan of chest, without intravenous contrast: 
 
 
 
INDICATION: 
 
Pulmonary nodule in lower lungs bilaterally as on CT scan of abdomen on 
10/6/2017. History of bladder cancer. TECHNIQUE: 
 
Multidetector CT scan with 5 mm slice thickness, without intravenous contrast, 
including chest and subphrenic levels. Coronal and sagittal images reformatted. All CT scans at this facility are performed using dose optimization technique as 
appropriate to a  performed  examination, to include automated exposer control, 
adjustment mA and / or  KV according to patient size (including appropriate 
matching  for site specific examination), or use  of iterative  reconstruction 
technique. COMPARISON STUDY: CT scan of abdomen and pelvis on 10/6/2017 and on 8/11/2016. FINDINGS: 
 
 
 
Pulmonary nodules: And bilateral lungs: On the CT scan of abdomen with CT urogram obtained on 8/11/2016, in the lower 
lungs bilaterally there was no evidence of pulmonary nodule. On the current study in the lateral portion of base of right lower lobe, as on 
image number 51 of series 4 there is pulmonary nodule measuring 1.39 cm 
transverse, 1.0 cm AP and 1.16 cm vertically, as compared to previous 
measurement of 1.18 cm transverse and 0.88 cm AP as on CT scan on 10/6/2017. This nodule has mildly irregular outline. In rest of right lung there is no other definable pulmonary nodule. In the posterior lateral portion of base of left lower lobe lung, as on image 
number 48 of series 4, there is a pulmonary nodule measuring 0.90 cm x 0.93 cm 
time 0.62 cm, as compared to previous maximum diameter of 0.84 cm as on CT scan 
on 10/6/2017. In rest of left lung there is no other definable pulmonary nodule. There are mild discoid atelectatic changes in the posterior portion of lower 
lingula of left lung. Lungs are mildly hyperinflated without obvious bulla formation. 
 
 
-------------------------------------------- Thoracic inlet, mediastinum and pulmonary abigail: At the thoracic inlet there is no obvious mass or lymphadenopathy. In the right upper paratracheal area there is a lymph node measuring 1.64 cm 
diameter. In right mid paratracheal area there is a lymph node with a maximal 
diameter of 1.4 cm. At the same level there is another right paratracheal 
enlarged lymph node measuring 1.54 cm x 1.2 cm. Another lymph node in this area 
measures maximal 1.09 cm in diameter. In right lower paratracheal area one lymph 
node measures 1.33 cm. In the subcarinal area there is a small lymph node 
measuring 1.8 cm transverse and 1.18 cm AP. In the left anterior portion of superior mediastinum, superior to aortic arch 
there is enlarged lymph node measuring 1.77 cm x 1.47 cm x 2.0 cm. In the 
aortopulmonary window area there is a prominent lymph node measuring 2.8 cm x 
2.14 cm x 2.0 cm. In the lower portion of aortopulmonary window area there is 
another prominent lymph node measuring 2.7 cm x 1.85 cm x 2.78 cm. At both pulmonary abigail, there is no obvious mass as on these noncontrast study. No evidence of pericardial effusion or pericardial thickening. The ascending thoracic aorta is mildly ectatic with a diameter of 3.7 cm. Mild to moderate calcified plaques are noted in the coronary arteries. Pleural spaces: No evidence of pleural effusion, pleural thickening or pleural plaque formation. Bony structures: 
No definable metastatic disease in bony structures of thorax. Mild multilevel 
spondylosis in thoracic spine. Subphrenic images: There is moderate fatty infiltration in liver. Spleen is normal. In the left adrenal gland there is a nodule, likely to be adrenal adenoma, measuring 1.9 cm 
x 1.37 cm but not completely visualized on this study. Pancreas is moderately atrophic. 
 
---------------------------------------------- 
 
IMPRESSIONS: 
 
 
 
1. In base of right lower lung there is pulmonary nodule measuring 1.29 cm in 
diameter. In base of lower lobe of left lung there is pulmonary nodule measuring 1.18 cm 
in diameter. These pulmonary nodules are suspicious for metastatic disease. These are new 
finding as compared to previous CT scan in August, 2016. In rest of both lungs no additional pulmonary nodule identified. 2. 
Multiple enlarged lymph nodes in mediastinum, as described. Metastatic 
lymphadenopathy suspected. 3. 
Moderate fatty infiltration in liver. The liver has been studied in details with 
the CT scan of abdomen. 4. 
For further evaluation, PET-CT scan suggested. RECOMMENDATIONS: 
As we discussed at the OR, the CT of your lungs demonstrated a couple of nodules that I think we need to look at more closely. The CT of the chest more fully evaluated these and they do appear to be amenable to biopsy. As such, I am going to ask my staff to schedule with one of the interventional radiologists for biopsy. Please contact me if you have any questions overall. Please call me if you have any questions: [phone] Sincerely, 
 
 
Guy Lemons. Lucita Bustillo MD/ Charlene Cruz, Certified Medical Assistant

## 2017-11-08 NOTE — PROGRESS NOTES
As we discussed at the OR, the CT of your lungs demonstrated a couple of nodules that I think we need to look at more closely. The CT of the chest more fully evaluated these and they do appear to be amenable to biopsy. As such, I am going to ask my staff to schedule with one of the interventional radiologists for biopsy. Please contact me if you have any questions overall. Amelie Patel-- please arrange for biopsy of the lung nodule ASAP with Dr Nerissa Santoro-- please place as radiologist choice on the specific nodule to biopsy.  Thanks,    M

## 2018-01-06 ENCOUNTER — ANESTHESIA EVENT (OUTPATIENT)
Dept: ENDOSCOPY | Age: 63
End: 2018-01-06
Payer: MEDICARE

## 2018-01-08 ENCOUNTER — HOSPITAL ENCOUNTER (OUTPATIENT)
Age: 63
Setting detail: OUTPATIENT SURGERY
Discharge: HOME | End: 2018-01-08
Attending: INTERNAL MEDICINE | Admitting: INTERNAL MEDICINE
Payer: MEDICARE

## 2018-01-08 ENCOUNTER — ANESTHESIA (OUTPATIENT)
Dept: ENDOSCOPY | Age: 63
End: 2018-01-08
Payer: MEDICARE

## 2018-01-08 VITALS
HEIGHT: 69 IN | HEART RATE: 79 BPM | SYSTOLIC BLOOD PRESSURE: 139 MMHG | TEMPERATURE: 98 F | WEIGHT: 146 LBS | OXYGEN SATURATION: 97 % | DIASTOLIC BLOOD PRESSURE: 75 MMHG | RESPIRATION RATE: 18 BRPM | BODY MASS INDEX: 21.62 KG/M2

## 2018-01-08 PROCEDURE — 76040000007: Performed by: INTERNAL MEDICINE

## 2018-01-08 PROCEDURE — 74011250636 HC RX REV CODE- 250/636

## 2018-01-08 PROCEDURE — 77030013992 HC SNR POLYP ENDOSC BSC -B: Performed by: INTERNAL MEDICINE

## 2018-01-08 PROCEDURE — 74011250636 HC RX REV CODE- 250/636: Performed by: NURSE ANESTHETIST, CERTIFIED REGISTERED

## 2018-01-08 PROCEDURE — 77030007290 HC DEV RTVR RTH STRC -G: Performed by: INTERNAL MEDICINE

## 2018-01-08 PROCEDURE — 76060000032 HC ANESTHESIA 0.5 TO 1 HR: Performed by: INTERNAL MEDICINE

## 2018-01-08 RX ORDER — HYDROMORPHONE HYDROCHLORIDE 2 MG/ML
2 INJECTION, SOLUTION INTRAMUSCULAR; INTRAVENOUS; SUBCUTANEOUS
Status: DISCONTINUED | OUTPATIENT
Start: 2018-01-08 | End: 2018-01-08 | Stop reason: HOSPADM

## 2018-01-08 RX ORDER — SODIUM CHLORIDE, SODIUM LACTATE, POTASSIUM CHLORIDE, CALCIUM CHLORIDE 600; 310; 30; 20 MG/100ML; MG/100ML; MG/100ML; MG/100ML
INJECTION, SOLUTION INTRAVENOUS
Status: DISCONTINUED | OUTPATIENT
Start: 2018-01-08 | End: 2018-01-08 | Stop reason: HOSPADM

## 2018-01-08 RX ORDER — SODIUM CHLORIDE, SODIUM LACTATE, POTASSIUM CHLORIDE, CALCIUM CHLORIDE 600; 310; 30; 20 MG/100ML; MG/100ML; MG/100ML; MG/100ML
75 INJECTION, SOLUTION INTRAVENOUS CONTINUOUS
Status: DISCONTINUED | OUTPATIENT
Start: 2018-01-08 | End: 2018-01-08 | Stop reason: HOSPADM

## 2018-01-08 RX ORDER — SODIUM CHLORIDE 0.9 % (FLUSH) 0.9 %
5-10 SYRINGE (ML) INJECTION AS NEEDED
Status: DISCONTINUED | OUTPATIENT
Start: 2018-01-08 | End: 2018-01-08 | Stop reason: HOSPADM

## 2018-01-08 RX ORDER — SODIUM CHLORIDE 0.9 % (FLUSH) 0.9 %
5-10 SYRINGE (ML) INJECTION EVERY 8 HOURS
Status: DISCONTINUED | OUTPATIENT
Start: 2018-01-08 | End: 2018-01-08 | Stop reason: HOSPADM

## 2018-01-08 RX ORDER — HYDROMORPHONE HYDROCHLORIDE 2 MG/ML
INJECTION, SOLUTION INTRAMUSCULAR; INTRAVENOUS; SUBCUTANEOUS
Status: COMPLETED
Start: 2018-01-08 | End: 2018-01-08

## 2018-01-08 RX ORDER — DEXTROSE 50 % IN WATER (D50W) INTRAVENOUS SYRINGE
25-50 AS NEEDED
Status: CANCELLED | OUTPATIENT
Start: 2018-01-08

## 2018-01-08 RX ORDER — HYDROMORPHONE HYDROCHLORIDE 2 MG/ML
INJECTION, SOLUTION INTRAMUSCULAR; INTRAVENOUS; SUBCUTANEOUS AS NEEDED
Status: DISCONTINUED | OUTPATIENT
Start: 2018-01-08 | End: 2018-01-08 | Stop reason: HOSPADM

## 2018-01-08 RX ORDER — PROPOFOL 10 MG/ML
INJECTION, EMULSION INTRAVENOUS AS NEEDED
Status: DISCONTINUED | OUTPATIENT
Start: 2018-01-08 | End: 2018-01-08 | Stop reason: HOSPADM

## 2018-01-08 RX ORDER — MAGNESIUM SULFATE 100 %
4 CRYSTALS MISCELLANEOUS AS NEEDED
Status: CANCELLED | OUTPATIENT
Start: 2018-01-08

## 2018-01-08 RX ADMIN — PROPOFOL 100 MG: 10 INJECTION, EMULSION INTRAVENOUS at 14:38

## 2018-01-08 RX ADMIN — PROPOFOL 50 MG: 10 INJECTION, EMULSION INTRAVENOUS at 14:33

## 2018-01-08 RX ADMIN — SODIUM CHLORIDE, SODIUM LACTATE, POTASSIUM CHLORIDE, CALCIUM CHLORIDE: 600; 310; 30; 20 INJECTION, SOLUTION INTRAVENOUS at 14:28

## 2018-01-08 RX ADMIN — HYDROMORPHONE HYDROCHLORIDE 2 MG: 2 INJECTION, SOLUTION INTRAMUSCULAR; INTRAVENOUS; SUBCUTANEOUS at 14:28

## 2018-01-08 RX ADMIN — PROPOFOL 50 MG: 10 INJECTION, EMULSION INTRAVENOUS at 15:06

## 2018-01-08 RX ADMIN — PROPOFOL 50 MG: 10 INJECTION, EMULSION INTRAVENOUS at 14:56

## 2018-01-08 RX ADMIN — PROPOFOL 100 MG: 10 INJECTION, EMULSION INTRAVENOUS at 14:28

## 2018-01-08 RX ADMIN — SODIUM CHLORIDE, SODIUM LACTATE, POTASSIUM CHLORIDE, AND CALCIUM CHLORIDE 75 ML/HR: 600; 310; 30; 20 INJECTION, SOLUTION INTRAVENOUS at 14:13

## 2018-01-08 RX ADMIN — PROPOFOL 50 MG: 10 INJECTION, EMULSION INTRAVENOUS at 15:00

## 2018-01-08 RX ADMIN — PROPOFOL 50 MG: 10 INJECTION, EMULSION INTRAVENOUS at 14:45

## 2018-01-08 RX ADMIN — PROPOFOL 50 MG: 10 INJECTION, EMULSION INTRAVENOUS at 14:52

## 2018-01-08 RX ADMIN — PROPOFOL 50 MG: 10 INJECTION, EMULSION INTRAVENOUS at 15:10

## 2018-01-08 NOTE — ANESTHESIA POSTPROCEDURE EVALUATION
Post-Anesthesia Evaluation & Assessment    Visit Vitals    /68    Pulse 64    Temp 36.7 °C (98 °F)    Resp 12    Ht 5' 9\" (1.753 m)    Wt 66.2 kg (146 lb)    SpO2 100%    BMI 21.56 kg/m2       Post-operative hydration adequate. Pain score (VAS): 0 Pain Scale 1: Numeric (0 - 10) (01/08/18 1405)  Pain Intensity 1: 10 (01/08/18 1405)   Managed. Mental status & Level of consciousness: returned to baseline    Neurological status: returned to baseline    Pulmonary status: airway patent, oxygen given as needed. Complications related to anesthesia: none    Patient has met all discharge requirements.     Additional comments:        Jet Moya MD  January 8, 2018

## 2018-01-08 NOTE — DISCHARGE INSTRUCTIONS
DISCHARGE SUMMARY from Nurse     POST-PROCEDURE INSTRUCTIONS:    Call your Physician if you:  ? Observe any excess bleeding. ? Develop a temperature over 100.5o F.  ? Experience abdominal, shoulder or chest pain. ? Notice any signs of decreased circulation or nerve impairment to an extremity such as a change in color, persistent numbness, tingling, coldness or increase in pain. ? Vomit blood or you have nausea and vomiting lasting longer than 4 hours. ? Are unable to take medications. ? Are unable to urinate within 8 hours after discharge following general anesthesia or intravenous sedation. For the next 24 hours after receiving general anesthesia or intravenous sedation, or while taking prescription Narcotics, limit your activities:  ? Do NOT drive a motor vehicle, operate hazard machinery or power tools, or perform tasks that require coordination. The medication you received during your procedure may have some effect on your mental awareness. ? Do NOT make important personal or business decisions. The medication you received during your procedure may have some effect on your mental awareness. ? Do NOT drink alcoholic beverages. These drinks do not mix well with the medications that have been given to you. ? Upon discharge from the hospital, you must be accompanied by a responsible adult. ? Resume your diet as directed by your physician. ? Resume medications as your physician has prescribed. ? Please give a list of your current medications to your Primary Care Provider. ? Please update this list whenever your medications are discontinued, doses are changed, or new medications (including over-the-counter products) are added. ? Please carry medication information at all times in case of emergency situations. These are general instructions for a healthy lifestyle:    No smoking/ No tobacco products/ Avoid exposure to second hand smoke.    Surgeon General's Warning: Quitting smoking now greatly reduces serious risk to your health. Obesity, smoking, and a sedentary lifestyle greatly increase your risk for illness.  A healthy diet, regular physical exercise & weight monitoring are important for maintaining a healthy lifestyle   You may be retaining fluid if you have a history of heart failure or if you experience any of the following symptoms:  Weight gain of 3 pounds or more overnight or 5 pounds in a week, increased swelling in our hands or feet or shortness of breath while lying flat in bed. Please call your doctor as soon as you notice any of these symptoms; do not wait until your next office visit. Recognize signs and symptoms of STROKE:  F  -  Face looks uneven  A  -  Arms unable to move or move unevenly  S  -  Speech slurred or non-existent  T  -  Time to call 911 - as soon as signs and symptoms begin - DO NOT go back to bed or wait to see If you get better - TIME IS BRAIN. Colorectal Screening   Colorectal cancer almost always develops from precancerous polyps (abnormal growths) in the colon or rectum. Screening tests can find precancerous polyps, so that they can be removed before they turn into cancer. Screening tests can also find colorectal cancer early, when treatment works best.  [facility] Speak with your physician about when you should begin screening and how often you should be tested. Linkovery Activation    Thank you for requesting access to Linkovery. Please follow the instructions below to securely access and download your online medical record. Linkovery allows you to send messages to your doctor, view your test results, renew your prescriptions, schedule appointments, and more. How Do I Sign Up? 1. In your internet browser, go to [email or web address]. 2. Click on the First Time User? Click Here link in the Sign In box. You will see the New Member Sign Up page.   3. Enter your Lucidworkshart Access Code exactly as it appears below. You will not need to use this code after youve completed the sign-up process. If you do not sign up before the expiration date, you must request a new code. Up & Net Access Code: Activation code not generated  Current Up & Net Status: Active (This is the date your Up & Net access code will )    4. Enter the last four digits of your Social Security Number (xxxx) and Date of Birth (mm/dd/yyyy) as indicated and click Submit. You will be taken to the next sign-up page. 5. Create a Up & Net ID. This will be your Up & Net login ID and cannot be changed, so think of one that is secure and easy to remember. 6. Create a Up & Net password. You can change your password at any time. 7. Enter your Password Reset Question and Answer. This can be used at a later time if you forget your password. 8. Enter your e-mail address. You will receive e-mail notification when new information is available in [address]. 9. Click Sign Up. You can now view and download portions of your medical record. 10. Click the Download Summary menu link to download a portable copy of your medical information. Additional Information    If you have questions, please call [phone]. Remember, Up & Net is NOT to be used for urgent needs. For medical emergencies, dial 911. Discharge information has been reviewed with the patient. The patient verbalized understanding. Colonoscopy: What to Expect at [address] [place] [address]  After you have a colonoscopy, you will stay at the clinic for 1 to 2 hours until the medicines wear off. Then you can go home. But you will need to arrange for a ride. Your doctor will tell you when you can eat and do your other usual activities. Your doctor will talk to you about when you will need your next colonoscopy. Your doctor can help you decide how often you need to be checked. This will depend on the results of your test and your risk for colorectal cancer.   After the test, you may be bloated or have gas pains. You may need to pass gas. If a biopsy was done or a polyp was removed, you may have streaks of blood in your stool (feces) for a few days. This care sheet gives you a general idea about how long it will take for you to recover. But each person recovers at a different pace. Follow the steps below to get better as quickly as possible. How can you care for yourself at home? Activity  ? · Rest when you feel tired. ? · You can do your normal activities when it feels okay to do so. Diet  ? · Follow your doctor's directions for eating. ? · Unless your doctor has told you not to, drink plenty of fluids. This helps to replace the fluids that were lost during the colon prep. ? · Do not drink alcohol. Medicines  ? · Your doctor will tell you if and when you can restart your medicines. He or she will also give you instructions about taking any new medicines. ? · If you take blood thinners, such as warfarin (Coumadin), clopidogrel (Plavix), or aspirin, be sure to talk to your doctor. He or she will tell you if and when to start taking those medicines again. Make sure that you understand exactly what your doctor wants you to do. ? · If polyps were removed or a biopsy was done during the test, your doctor may tell you not to take aspirin or other anti-inflammatory medicines for a few days. These include ibuprofen (Advil, Motrin) and naproxen (Aleve). Other instructions  ? · For your safety, do not drive or operate machinery until the medicine wears off and you can think clearly. Your doctor may tell you not to drive or operate machinery until the day after your test.   ? · Do not sign legal documents or make major decisions until the medicine wears off and you can think clearly. The anesthesia can make it hard for you to fully understand what you are agreeing to. Follow-up care is a key part of your treatment and safety.  Be sure to make and go to all appointments, and call your doctor if you are having problems. It's also a good idea to know your test results and keep a list of the medicines you take. When should you call for help? Call 911 anytime you think you may need emergency care. For example, call if:  ? · You passed out (lost consciousness). ? · You pass maroon or bloody stools. ? · You have trouble breathing. ?Call your doctor now or seek immediate medical care if:  ? · You have pain that does not get better after you take pain medicine. ? · You are sick to your stomach or cannot drink fluids. ? · You have new or worse belly pain. ? · You have blood in your stools. ? · You have a fever. ? · You cannot pass stools or gas. ? Watch closely for changes in your health, and be sure to contact your doctor if you have any problems. Where can you learn more? Go to http://darrick-joanne.info/. Enter E264 in the search box to learn more about \"Colonoscopy: What to Expect at Home. \"  Current as of: May 12, 2017  Content Version: 11.4  © 6791-2412 Healthwise, Incorporated. Care instructions adapted under license by [employer] (which disclaims liability or warranty for this information). If you have questions about a medical condition or this instruction, always ask your healthcare professional. [address] any warranty or liability for your use of this information.

## 2018-01-08 NOTE — IP AVS SNAPSHOT
Susy Bautista 
 
 
 [address] [address] [address] [id number] [phone] Patient: Mala Trinidad MRN: [id number] About your hospitalization You were admitted on:  January 8, 2018 You last received care in the:  [facility] You were discharged on:  January 8, 2018 Why you were hospitalized Your primary diagnosis was:  Not on File Follow-up Information None Your Scheduled Appointments Wednesday January 24, 2018  3:45 PM EST  
ESTABLISHED PATIENT with Amber Rea MD  
[facility] ([facility]) Carmelo Waldrop [address] [place] [address]  
[phone] Discharge Orders None A check ronaldo indicates which time of day the medication should be taken. My Medications ASK your doctor about these medications Instructions Each Dose to Equal  
 Morning Noon Evening Bedtime ALPRAZolam 1 mg tablet Commonly known as:  Inelmike Clemente Your last dose was: Your next dose is:    
   
   
 take 1 tablet by mouth twice a day if needed for anxiety  
     
   
   
   
  
 cephALEXin 500 mg capsule Commonly known as:  Krupa Sheets Your last dose was: Your next dose is:    
   
   
      
   
   
   
  
 chlorhexidine 0.12 % solution Commonly known as:  PERIDEX Your last dose was: Your next dose is:    
   
   
      
   
   
   
  
 gabapentin 300 mg capsule Commonly known as:  NEURONTIN Your last dose was: Your next dose is:    
   
   
      
   
   
   
  
 lidocaine 5 % Commonly known as:  Rivera Mering Your last dose was: Your next dose is:    
   
   
      
   
   
   
  
 meperidine 50 mg tablet Commonly known as:  DEMEROL Your last dose was: Your next dose is:    
   
   
 take 1 tablet by mouth every 4 to 6 hours if needed for pain methocarbamol 500 mg tablet Commonly known as:  ROBAXIN Your last dose was: Your next dose is: Take 500 mg by mouth four (4) times daily as needed. Indications: MUSCLE SPASM  
 500 mg  
    
   
   
   
  
 mirabegron ER 25 mg ER tablet Commonly known as:  MYRBETRIQ Your last dose was: Your next dose is: Take 1 Tab by mouth daily. 25 mg  
    
   
   
   
  
 oxybutynin chloride XL 10 mg CR tablet Commonly known as:  DITROPAN XL Your last dose was: Your next dose is: Take 1 Tab by mouth daily. 10 mg  
    
   
   
   
  
 oxyCODONE IR 10 mg Tab immediate release tablet Commonly known as:  Laura Peck Your last dose was: Your next dose is:    
   
   
 take 1 tablet by mouth four times a day if needed  
     
   
   
   
  
 traMADol 50 mg tablet Commonly known as:  ULTRAM  
   
Your last dose was: Your next dose is: Take 1 Tab by mouth every six (6) hours as needed for Pain. 50 mg  
    
   
   
   
  
 traZODone 50 mg tablet Commonly known as:  Magnolia Bolognese Your last dose was: Your next dose is:    
   
   
 take 1 tablet by mouth daily Discharge Instructions DISCHARGE SUMMARY from Nurse POST-PROCEDURE INSTRUCTIONS: 
 
Call your Physician if you: 
? Observe any excess bleeding. ? Develop a temperature over 100.5o F. 
? Experience abdominal, shoulder or chest pain. ? Notice any signs of decreased circulation or nerve impairment to an extremity such as a change in color, persistent numbness, tingling, coldness or increase in pain. ? Vomit blood or you have nausea and vomiting lasting longer than 4 hours. ? Are unable to take medications. ? Are unable to urinate within 8 hours after discharge following general anesthesia or intravenous sedation.  
 
For the next 24 hours after receiving general anesthesia or intravenous sedation, or while taking prescription Narcotics, limit your activities: 
? Do NOT drive a motor vehicle, operate hazard machinery or power tools, or perform tasks that require coordination. The medication you received during your procedure may have some effect on your mental awareness. ? Do NOT make important personal or business decisions. The medication you received during your procedure may have some effect on your mental awareness. ? Do NOT drink alcoholic beverages. These drinks do not mix well with the medications that have been given to you. ? Upon discharge from the hospital, you must be accompanied by a responsible adult. ? Resume your diet as directed by your physician. ? Resume medications as your physician has prescribed. ? Please give a list of your current medications to your Primary Care Provider. ? Please update this list whenever your medications are discontinued, doses are changed, or new medications (including over-the-counter products) are added. ? Please carry medication information at all times in case of emergency situations. These are general instructions for a healthy lifestyle: No smoking/ No tobacco products/ Avoid exposure to second hand smoke. ? Surgeon General's Warning:  Quitting smoking now greatly reduces serious risk to your health. Obesity, smoking, and a sedentary lifestyle greatly increase your risk for illness. ? A healthy diet, regular physical exercise & weight monitoring are important for maintaining a healthy lifestyle ? You may be retaining fluid if you have a history of heart failure or if you experience any of the following symptoms:  Weight gain of 3 pounds or more overnight or 5 pounds in a week, increased swelling in our hands or feet or shortness of breath while lying flat in bed. Please call your doctor as soon as you notice any of these symptoms; do not wait until your next office visit. Recognize signs and symptoms of STROKE: 
F  -  Face looks uneven A  -  Arms unable to move or move unevenly S  -  Speech slurred or non-existent T  -  Time to call 911 - as soon as signs and symptoms begin - DO NOT go back to bed or wait to see If you get better - TIME IS BRAIN. Colorectal Screening ? Colorectal cancer almost always develops from precancerous polyps (abnormal growths) in the colon or rectum. Screening tests can find precancerous polyps, so that they can be removed before they turn into cancer. Screening tests can also find colorectal cancer early, when treatment works best. 
? Speak with your physician about when you should begin screening and how often you should be tested. TeadsharFusionStorm Activation Thank you for requesting access to [employer]. Please follow the instructions below to securely access and download your online medical record. [employer] Software allows you to send messages to your doctor, view your test results, renew your prescriptions, schedule appointments, and more. How Do I Sign Up? 1. In your internet browser, go to [email or web address]. 2. Click on the First Time User? Click Here link in the Sign In box. You will see the New Member Sign Up page. 3. Enter your Smith [employer] Software Access Code exactly as it appears below. You will not need to use this code after youve completed the sign-up process. If you do not sign up before the expiration date, you must request a new code. Smith Micro Software Access Code: Activation code not generated Current Smith Micro Software Status: Active (This is the date your Smith [employer] Software access code will ) 4. Enter the last four digits of your Social Security Number (xxxx) and Date of Birth (mm/dd/yyyy) as indicated and click Submit. You will be taken to the next sign-up page. 5. Create a Smith [employer] Software ID. This will be your Smith Micro Software login ID and cannot be changed, so think of one that is secure and easy to remember. 6. Create a Se[employer] Networks password. You can change your password at any time. 7. Enter your Password Reset Question and Answer. This can be used at a later time if you forget your password. 8. Enter your e-mail address. You will receive e-mail notification when new information is available in [address] 9. Click Sign Up. You can now view and download portions of your medical record. 10. Click the Download Summary menu link to download a portable copy of your medical information. Additional Information If you have questions, please call [phone]. Remember, [employer] is NOT to be used for urgent needs. For medical emergencies, dial 911. Discharge information has been reviewed with the patient. The patient verbalized understanding. Colonoscopy: What to Expect at [facility] Your Recovery After you have a colonoscopy, you will stay at the clinic for 1 to 2 hours until the medicines wear off. Then you can go home. But you will need to arrange for a ride. Your doctor will tell you when you can eat and do your other usual activities. Your doctor will talk to you about when you will need your next colonoscopy. Your doctor can help you decide how often you need to be checked. This will depend on the results of your test and your risk for colorectal cancer. After the test, you may be bloated or have gas pains. You may need to pass gas. If a biopsy was done or a polyp was removed, you may have streaks of blood in your stool (feces) for a few days. This care sheet gives you a general idea about how long it will take for you to recover. But each person recovers at a different pace. Follow the steps below to get better as quickly as possible. How can you care for yourself at home? Activity ? · Rest when you feel tired. ? · You can do your normal activities when it feels okay to do so. Diet ? · Follow your doctor's directions for eating. ? · Unless your doctor has told you not to, drink plenty of fluids. This helps to replace the fluids that were lost during the colon prep. ? · Do not drink alcohol. Medicines ? · Your doctor will tell you if and when you can restart your medicines. He or she will also give you instructions about taking any new medicines. ? · If you take blood thinners, such as warfarin (Coumadin), clopidogrel (Plavix), or aspirin, be sure to talk to your doctor. He or she will tell you if and when to start taking those medicines again. Make sure that you understand exactly what your doctor wants you to do. ? · If polyps were removed or a biopsy was done during the test, your doctor may tell you not to take aspirin or other anti-inflammatory medicines for a few days. These include ibuprofen (Advil, Motrin) and naproxen (Aleve). Other instructions ? · For your safety, do not drive or operate machinery until the medicine wears off and you can think clearly. Your doctor may tell you not to drive or operate machinery until the day after your test.  
? · Do not sign legal documents or make major decisions until the medicine wears off and you can think clearly. The anesthesia can make it hard for you to fully understand what you are agreeing to. Follow-up care is a key part of your treatment and safety. Be sure to make and go to all appointments, and call your doctor if you are having problems. It's also a good idea to know your test results and keep a list of the medicines you take. When should you call for help? Call 911 anytime you think you may need emergency care. For example, call if: 
? · You passed out (lost consciousness). ? · You pass maroon or bloody stools. ? · You have trouble breathing. ?Call your doctor now or seek immediate medical care if: 
? · You have pain that does not get better after you take pain medicine. ? · You are sick to your stomach or cannot drink fluids. ? · You have new or worse belly pain. ? · You have blood in your stools. ? · You have a fever. ? · You cannot pass stools or gas. ? Watch closely for changes in your health, and be sure to contact your doctor if you have any problems. Where can you learn more? Go to http://darrick-joanne.info/. Enter E264 in the search box to learn more about \"Colonoscopy: What to Expect at Home. \" Current as of: May 12, 2017 Content Version: 11.4 © 7855-9287 [employer]. Care instructions adapted under license by [employer] (which disclaims liability or warranty for this information). If you have questions about a medical condition or this instruction, always ask your healthcare professional. [address] any warranty or liability for your use of this information. Introducing [facility]! Dear Chrissy Steel: Thank you for requesting a Cognection account. Our records indicate that you already have an active Cognection account. You can access your account anytime at [email or web address] Did you know that you can access your hospital and ER discharge instructions at any time in Cognection? You can also review all of your test results from your hospital stay or ER visit. Additional Information If you have questions, please visit the Frequently Asked Questions section of the Cognection website at [email or web address] Remember, Cognection is NOT to be used for urgent needs. For medical emergencies, dial 911. Now available from your iPhone and Android! Providers Seen During Your Hospitalization Provider Specialty Primary office phone Levi Nava MD Gastroenterology [phone] Your Primary Care Physician (PCP) Primary Care Physician Office Phone Office Fax Rito Roldan [phone] You are allergic to the following Allergen Reactions Codeine Nausea and Vomiting \"ONLY IN VERY HIGH DOSES\" Macrodantin (Nitrofurantoin Macrocrystal) Diarrhea Nausea and Vomiting Other (comments) Made pt feel very confused Other Plant, Animal, Environmental Other (comments) Oxycodone Nausea and Vomiting Other reaction(s): gi distress \"ONLY IN VERY HIGH DOSES\" \"ONLY IN VERY HIGH DOSES\" Percocet (Oxycodone-Acetaminophen) Nausea and Vomiting \"ONLY IN VERY HIGH DOSES\" Pollen Extracts Runny Nose Pravachol (Pravastatin) Nausea Only Other reaction(s): gi distress Vicodin (Hydrocodone-Acetaminophen) Nausea and Vomiting Other reaction(s): gi distress \"ONLY IN VERY HIGH DOSES\" \"ONLY IN VERY HIGH DOSES\" Zoloft (Sertraline) Nausea and Vomiting Other reaction(s): gi distress Recent Documentation Height Weight BMI Smoking Status 1.753 m 66.2 kg 21.56 kg/m2 Current Some Day Smoker Emergency Contacts Name Discharge Info Relation Home Work Mobile Emilee Brown DISCHARGE CAREGIVER [3] Sister [23] [phone] Michael Sy  Other Relative [6] [phone] Patient Belongings The following personal items are in your possession at time of discharge: 
  Dental Appliances: None  Visual Aid: None Please provide this summary of care documentation to your next provider. Signatures-by signing, you are acknowledging that this After Visit Summary has been reviewed with you and you have received a copy. Patient Signature:  ____________________________________________________________ Date:  ____________________________________________________________  
  
Shea Moritz Provider Signature:  ____________________________________________________________ Date:  ____________________________________________________________

## 2018-01-08 NOTE — ANESTHESIA PREPROCEDURE EVALUATION
Anesthetic History   No history of anesthetic complications            Review of Systems / Medical History  Patient summary reviewed and pertinent labs reviewed    Pulmonary          Smoker         Neuro/Psych         Psychiatric history     Cardiovascular                  Exercise tolerance: <4 METS  Comments: Pt looks cachectic. In some pain. GI/Hepatic/Renal  Within defined limits              Endo/Other    Diabetes    Cancer     Other Findings   Comments:   Risk Factors for Postoperative nausea/vomiting:       History of postoperative nausea/vomiting? NO       Female? NO       Motion sickness? NO       Intended opioid administration for postoperative analgesia? YES      Smoking Abstinence  Current Smoker? YES  Elective Surgery? YES  Seen preoperatively by anesthesiologist or proxy prior to day of surgery? YES  Pt abstained from smoking 24 hours prior to anesthesia?  NO           Physical Exam    Airway  Mallampati: II  TM Distance: 4 - 6 cm  Neck ROM: normal range of motion   Mouth opening: Normal     Cardiovascular  Regular rate and rhythm,  S1 and S2 normal,  no murmur, click, rub, or gallop             Dental    Dentition: Poor dentition     Pulmonary  Breath sounds clear to auscultation               Abdominal  GI exam deferred       Other Findings            Anesthetic Plan    ASA: 4  Anesthesia type: MAC          Induction: Intravenous  Anesthetic plan and risks discussed with: Patient

## 2018-01-08 NOTE — H&P
[email or web address]  [phone]    GASTROENTEROLOGY Pre-Procedure H and P      Impression/Plan:   1. This patient is consented for a colonoscopy for Abnormal CT scan and abdominal pain    Chief Complaint: Abnormal CT scan and abdominal pain      HPI:  Melanie Brock is a 57 y.o. male who is being is having a colonoscopy for above. He has bladder ca and has a lot of abdominal pain. He had a CT that showed metastatic bladder ca and also showed some changes in the rectum. Last colo in 2014 showed TA. Colonoscopy today is to make sure rectum is cleared. PMH:   Past Medical History:   Diagnosis Date    Acute sinusitis, unspecified     Alcohol abuse, unspecified     Bladder cancer ([facility] [address] 2013    chemo & radiation    Cough     Depressive disorder     Hematuria     HLD (hyperlipidemia)     Impotence of organic origin     Insomnia, unspecified     Other abnormal glucose     Other seborrheic keratosis     Pain in joint, shoulder region        PSH:   Past Surgical History:   Procedure Laterality Date    CYSTOURETHROSCOPY,BIOPSIES      CYSTOURETHROSCOPY,BIOPSIES  12/9/2014    Dr. Cain Simms    HX COLONOSCOPY      HX OTHER SURGICAL  01/04/2017    Tore (R) arm tendon, had surgery at [address]  01/04/13    [facility], TURBT - low-grade stage TA, Dr Arslan Watts  05/27/14    [facility], Cysto-TURP, Transurethral resection/multiple bladder biopsies - Papillary Urothelial Hyperplasia, Dr Kalina Delgadillo  1/29/15    Dr. Cain Simms - clot evacuation/fulguration       Social HX:   Social History     Social History    Marital status:      Spouse name: N/A    Number of children: N/A    Years of education: N/A     Occupational History    Not on file.      Social History Main Topics    Smoking status: Current Some Day Smoker     Packs/day: 1.00     Years: 43.00     Types: Cigarettes    Smokeless tobacco: Never Used      Comment: using electronic cigarettes & nicotine patch    Alcohol use 0.0 oz/week      Comment: occassional    Drug use: No    Sexual activity: Not on file     Other Topics Concern    Not on file     Social History Narrative       FHX:   Family History   Problem Relation Age of Onset    Hypertension Mother     Kidney Disease Father     Heart Disease Father     Diabetes Sister     Hypertension Brother     Stroke Brother        Allergy:   Allergies   Allergen Reactions    Codeine Nausea and Vomiting     \"ONLY IN VERY HIGH DOSES\"        Macrodantin [Nitrofurantoin Macrocrystal] Diarrhea, Nausea and Vomiting and Other (comments)     Made pt feel very confused    Other Plant, Animal, Environmental Other (comments)    Oxycodone Nausea and Vomiting     Other reaction(s): gi distress  \"ONLY IN VERY HIGH DOSES\"  \"ONLY IN VERY HIGH DOSES\"      Percocet [Oxycodone-Acetaminophen] Nausea and Vomiting     \"ONLY IN VERY HIGH DOSES\"        Pollen Extracts Runny Nose    Pravachol [Pravastatin] Nausea Only     Other reaction(s): gi distress    Vicodin [Hydrocodone-Acetaminophen] Nausea and Vomiting     Other reaction(s): gi distress  \"ONLY IN VERY HIGH DOSES\"  \"ONLY IN VERY HIGH DOSES\"      Zoloft [Sertraline] Nausea and Vomiting     Other reaction(s): gi distress       Home Medications:     Prescriptions Prior to Admission   Medication Sig    oxyCODONE IR (ROXICODONE) 10 mg tab immediate release tablet take 1 tablet by mouth four times a day if needed    cephALEXin (KEFLEX) 500 mg capsule     oxybutynin chloride XL (DITROPAN XL) 10 mg CR tablet Take 1 Tab by mouth daily.     chlorhexidine (PERIDEX) 0.12 % solution     ALPRAZolam (XANAX) 1 mg tablet take 1 tablet by mouth twice a day if needed for anxiety    gabapentin (NEURONTIN) 300 mg capsule     lidocaine (LIDODERM) 5 %     meperidine (DEMEROL) 50 mg tablet take 1 tablet by mouth every 4 to 6 hours if needed for pain    traZODone (DESYREL) 50 mg tablet take 1 tablet by mouth daily    mirabegron ER (MYRBETRIQ) 25 mg ER tablet Take 1 Tab by mouth daily.  methocarbamol (ROBAXIN) 500 mg tablet Take 500 mg by mouth four (4) times daily as needed. Indications: MUSCLE SPASM    traMADol (ULTRAM) 50 mg tablet Take 1 Tab by mouth every six (6) hours as needed for Pain. Review of Systems:     Constitutional: No fevers, chills, weight loss, fatigue. Skin: No rashes, pruritis, jaundice, ulcerations, erythema. HENT: No headaches, nosebleeds, sinus pressure, rhinorrhea, sore throat. Eyes: No visual changes, blurred vision, eye pain, photophobia, jaundice. Cardiovascular: No chest pain, heart palpitations. Respiratory: No cough, SOB, wheezing, chest discomfort, orthopnea. Gastrointestinal:    Genitourinary: No dysuria, bleeding, discharge, pyuria. Musculoskeletal: No weakness, arthralgias, wasting. Endo: No sweats. Heme: No bruising, easy bleeding. Allergies: As noted. Neurological: Cranial nerves intact. Alert and oriented. Gait not assessed. Psychiatric:  No anxiety, depression, hallucinations. There were no vitals taken for this visit. Physical Assessment:     constitutional: appearance: well developed, well nourished, normal habitus, no deformities, in no acute distress. skin: inspection: no rashes, ulcers, icterus or other lesions; no clubbing or telangiectasias. palpation: no induration or subcutaneos nodules. eyes: inspection: normal conjunctivae and lids; no jaundice pupils: normal  ENMT: mouth: normal oral mucosa,lips and gums; good dentition. oropharynx: normal tongue, hard and soft palate; posterior pharynx without erithema, exudate or lesions. neck: thyroid: normal size, consistency and position; no masses or tenderness. respiratory: effort: normal chest excursion; no intercostal retraction or accessory muscle use. cardiovascular: abdominal aorta: normal size and position; no bruits. palpation: PMI of normal size and position; normal rhythm; no thrill or murmurs.    abdominal: abdomen: normal consistency; no tenderness or masses. hernias: no hernias appreciated. liver: normal size and consistency. spleen: not palpable. rectal: hemoccult/guaiac: not performed. musculoskeletal: digits and nails: no clubbing, cyanosis, petechiae or other inflammatory conditions. gait: normal gait and station head and neck: normal range of motion; no pain, crepitation or contracture. spine/ribs/pelvis: normal range of motion; no pain, deformity or contracture. neurologic: cranial nerves: II-XII normal.   psychiatric: judgement/insight: within normal limits. memory: within normal limits for recent and remote events. mood and affect: no evidence of depression, anxiety or agitation. orientation: oriented to time, space and person. Basic Metabolic Profile   No results for input(s): NA, K, CL, CO2, BUN, GLU, CA, MG, PHOS in the last 72 hours. No lab exists for component: CREAT      CBC w/Diff    No results for input(s): WBC, RBC, HGB, HCT, MCV, MCH, MCHC, RDW, PLT, HGBEXT, HCTEXT, PLTEXT in the last 72 hours. No lab exists for component: MPV No results for input(s): GRANS, LYMPH, EOS, PRO, MYELO, METAS, BLAST in the last 72 hours. No lab exists for component: MONO, BASO     Hepatic Function   No results for input(s): ALB, TP, TBILI, GPT, SGOT, AP, AML, LPSE in the last 72 hours. No lab exists for component: DBILI     Coags   No results for input(s): PTP, INR, APTT in the last 72 hours. No lab exists for component: Fredi Gotti MD.  [facility] [address]  Cell: [phone]  Direct pager: [phone]  [email or web address]  [email or web address]. [employer]

## 2018-01-24 ENCOUNTER — APPOINTMENT (OUTPATIENT)
Dept: CT IMAGING | Age: 63
DRG: 329 | End: 2018-01-24
Attending: EMERGENCY MEDICINE
Payer: MEDICARE

## 2018-01-24 ENCOUNTER — APPOINTMENT (OUTPATIENT)
Dept: GENERAL RADIOLOGY | Age: 63
DRG: 329 | End: 2018-01-24
Attending: EMERGENCY MEDICINE
Payer: MEDICARE

## 2018-01-24 ENCOUNTER — HOSPITAL ENCOUNTER (INPATIENT)
Age: 63
LOS: 8 days | Discharge: NURSING, REHAB OR LONG-TERM CARE | DRG: 329 | End: 2018-02-01
Attending: EMERGENCY MEDICINE | Admitting: FAMILY MEDICINE
Payer: MEDICARE

## 2018-01-24 DIAGNOSIS — K56.609 SBO (SMALL BOWEL OBSTRUCTION) (HCC): Primary | ICD-10-CM

## 2018-01-24 DIAGNOSIS — R11.2 NON-INTRACTABLE VOMITING WITH NAUSEA, UNSPECIFIED VOMITING TYPE: ICD-10-CM

## 2018-01-24 DIAGNOSIS — C79.9 METASTATIC DISEASE (HCC): ICD-10-CM

## 2018-01-24 DIAGNOSIS — R10.84 ABDOMINAL PAIN, GENERALIZED: ICD-10-CM

## 2018-01-24 DIAGNOSIS — C67.9 MALIGNANT NEOPLASM OF URINARY BLADDER, UNSPECIFIED SITE (HCC): ICD-10-CM

## 2018-01-24 PROBLEM — G62.0 CHEMOTHERAPY-INDUCED NEUROPATHY (HCC): Chronic | Status: ACTIVE | Noted: 2018-01-24

## 2018-01-24 PROBLEM — F10.10 ALCOHOL ABUSE: Chronic | Status: ACTIVE | Noted: 2018-01-24

## 2018-01-24 PROBLEM — R10.9 ABDOMINAL PAIN: Status: ACTIVE | Noted: 2018-01-24

## 2018-01-24 PROBLEM — T45.1X5A CHEMOTHERAPY-INDUCED NEUROPATHY (HCC): Chronic | Status: ACTIVE | Noted: 2018-01-24

## 2018-01-24 PROBLEM — R10.9 INTRACTABLE ABDOMINAL PAIN: Status: ACTIVE | Noted: 2018-01-24

## 2018-01-24 LAB
ALBUMIN SERPL-MCNC: 3.3 G/DL (ref 3.4–5)
ALBUMIN/GLOB SERPL: 0.8 {RATIO} (ref 0.8–1.7)
ALP SERPL-CCNC: 75 U/L (ref 45–117)
ALT SERPL-CCNC: 20 U/L (ref 16–61)
ANION GAP SERPL CALC-SCNC: 5 MMOL/L (ref 3–18)
AST SERPL-CCNC: 28 U/L (ref 15–37)
BASOPHILS # BLD: 0 K/UL (ref 0–0.1)
BASOPHILS NFR BLD: 0 % (ref 0–2)
BILIRUB SERPL-MCNC: 1 MG/DL (ref 0.2–1)
BUN SERPL-MCNC: 20 MG/DL (ref 7–18)
BUN/CREAT SERPL: 20 (ref 12–20)
CALCIUM SERPL-MCNC: 9.6 MG/DL (ref 8.5–10.1)
CHLORIDE SERPL-SCNC: 99 MMOL/L (ref 100–108)
CO2 SERPL-SCNC: 34 MMOL/L (ref 21–32)
CREAT SERPL-MCNC: 0.99 MG/DL (ref 0.6–1.3)
DIFFERENTIAL METHOD BLD: ABNORMAL
EOSINOPHIL # BLD: 0.1 K/UL (ref 0–0.4)
EOSINOPHIL NFR BLD: 1 % (ref 0–5)
ERYTHROCYTE [DISTWIDTH] IN BLOOD BY AUTOMATED COUNT: 12.8 % (ref 11.6–14.5)
GLOBULIN SER CALC-MCNC: 4.4 G/DL (ref 2–4)
GLUCOSE SERPL-MCNC: 142 MG/DL (ref 74–99)
HCT VFR BLD AUTO: 46.8 % (ref 36–48)
HGB BLD-MCNC: 16.8 G/DL (ref 13–16)
LACTATE BLD-SCNC: 0.7 MMOL/L (ref 0.4–2)
LACTATE BLD-SCNC: 2.3 MMOL/L (ref 0.4–2)
LIPASE SERPL-CCNC: 67 U/L (ref 73–393)
LYMPHOCYTES # BLD: 1.2 K/UL (ref 0.9–3.6)
LYMPHOCYTES NFR BLD: 16 % (ref 21–52)
MCH RBC QN AUTO: 33.5 PG (ref 24–34)
MCHC RBC AUTO-ENTMCNC: 35.9 G/DL (ref 31–37)
MCV RBC AUTO: 93.2 FL (ref 74–97)
MONOCYTES # BLD: 0.8 K/UL (ref 0.05–1.2)
MONOCYTES NFR BLD: 11 % (ref 3–10)
NEUTS SEG # BLD: 5.6 K/UL (ref 1.8–8)
NEUTS SEG NFR BLD: 72 % (ref 40–73)
PLATELET # BLD AUTO: 296 K/UL (ref 135–420)
PMV BLD AUTO: 9.9 FL (ref 9.2–11.8)
POTASSIUM SERPL-SCNC: 3.6 MMOL/L (ref 3.5–5.5)
PROT SERPL-MCNC: 7.7 G/DL (ref 6.4–8.2)
RBC # BLD AUTO: 5.02 M/UL (ref 4.7–5.5)
SODIUM SERPL-SCNC: 138 MMOL/L (ref 136–145)
WBC # BLD AUTO: 7.8 K/UL (ref 4.6–13.2)

## 2018-01-24 PROCEDURE — 74011250636 HC RX REV CODE- 250/636: Performed by: FAMILY MEDICINE

## 2018-01-24 PROCEDURE — 74011000258 HC RX REV CODE- 258: Performed by: EMERGENCY MEDICINE

## 2018-01-24 PROCEDURE — 71045 X-RAY EXAM CHEST 1 VIEW: CPT

## 2018-01-24 PROCEDURE — 96374 THER/PROPH/DIAG INJ IV PUSH: CPT

## 2018-01-24 PROCEDURE — 83690 ASSAY OF LIPASE: CPT | Performed by: EMERGENCY MEDICINE

## 2018-01-24 PROCEDURE — 83605 ASSAY OF LACTIC ACID: CPT

## 2018-01-24 PROCEDURE — 74011000258 HC RX REV CODE- 258: Performed by: FAMILY MEDICINE

## 2018-01-24 PROCEDURE — 96376 TX/PRO/DX INJ SAME DRUG ADON: CPT

## 2018-01-24 PROCEDURE — 65270000029 HC RM PRIVATE

## 2018-01-24 PROCEDURE — 96361 HYDRATE IV INFUSION ADD-ON: CPT

## 2018-01-24 PROCEDURE — 74011000250 HC RX REV CODE- 250: Performed by: EMERGENCY MEDICINE

## 2018-01-24 PROCEDURE — 74011250636 HC RX REV CODE- 250/636: Performed by: EMERGENCY MEDICINE

## 2018-01-24 PROCEDURE — 51702 INSERT TEMP BLADDER CATH: CPT

## 2018-01-24 PROCEDURE — 77030010545

## 2018-01-24 PROCEDURE — 96375 TX/PRO/DX INJ NEW DRUG ADDON: CPT

## 2018-01-24 PROCEDURE — 85025 COMPLETE CBC W/AUTO DIFF WBC: CPT | Performed by: EMERGENCY MEDICINE

## 2018-01-24 PROCEDURE — 74011250636 HC RX REV CODE- 250/636: Performed by: STUDENT IN AN ORGANIZED HEALTH CARE EDUCATION/TRAINING PROGRAM

## 2018-01-24 PROCEDURE — 74177 CT ABD & PELVIS W/CONTRAST: CPT

## 2018-01-24 PROCEDURE — 74018 RADEX ABDOMEN 1 VIEW: CPT

## 2018-01-24 PROCEDURE — 80053 COMPREHEN METABOLIC PANEL: CPT | Performed by: EMERGENCY MEDICINE

## 2018-01-24 PROCEDURE — 74011636320 HC RX REV CODE- 636/320: Performed by: EMERGENCY MEDICINE

## 2018-01-24 PROCEDURE — 99285 EMERGENCY DEPT VISIT HI MDM: CPT

## 2018-01-24 RX ORDER — SODIUM CHLORIDE 0.9 % (FLUSH) 0.9 %
5-10 SYRINGE (ML) INJECTION AS NEEDED
Status: DISCONTINUED | OUTPATIENT
Start: 2018-01-24 | End: 2018-02-01 | Stop reason: HOSPADM

## 2018-01-24 RX ORDER — FACIAL-BODY WIPES
10 EACH TOPICAL DAILY PRN
Status: DISCONTINUED | OUTPATIENT
Start: 2018-01-24 | End: 2018-01-25

## 2018-01-24 RX ORDER — SODIUM CHLORIDE 0.9 % (FLUSH) 0.9 %
5-10 SYRINGE (ML) INJECTION EVERY 8 HOURS
Status: DISCONTINUED | OUTPATIENT
Start: 2018-01-25 | End: 2018-01-25

## 2018-01-24 RX ORDER — LORAZEPAM 1 MG/1
2 TABLET ORAL
Status: DISCONTINUED | OUTPATIENT
Start: 2018-01-24 | End: 2018-01-25

## 2018-01-24 RX ORDER — LORAZEPAM 2 MG/ML
1 INJECTION INTRAMUSCULAR
Status: DISCONTINUED | OUTPATIENT
Start: 2018-01-24 | End: 2018-01-26

## 2018-01-24 RX ORDER — HYDROMORPHONE HYDROCHLORIDE 1 MG/ML
1 INJECTION, SOLUTION INTRAMUSCULAR; INTRAVENOUS; SUBCUTANEOUS ONCE
Status: COMPLETED | OUTPATIENT
Start: 2018-01-24 | End: 2018-01-24

## 2018-01-24 RX ORDER — LORAZEPAM 2 MG/ML
3 INJECTION INTRAMUSCULAR
Status: DISCONTINUED | OUTPATIENT
Start: 2018-01-24 | End: 2018-01-25

## 2018-01-24 RX ORDER — DOCUSATE SODIUM 100 MG/1
100 CAPSULE, LIQUID FILLED ORAL DAILY
COMMUNITY
End: 2018-02-01

## 2018-01-24 RX ORDER — FENTANYL 25 UG/1
1 PATCH TRANSDERMAL
Status: DISCONTINUED | OUTPATIENT
Start: 2018-01-24 | End: 2018-01-25

## 2018-01-24 RX ORDER — ENOXAPARIN SODIUM 100 MG/ML
40 INJECTION SUBCUTANEOUS EVERY 24 HOURS
Status: DISCONTINUED | OUTPATIENT
Start: 2018-01-24 | End: 2018-02-01 | Stop reason: HOSPADM

## 2018-01-24 RX ORDER — NALOXONE HYDROCHLORIDE 0.4 MG/ML
0.4 INJECTION, SOLUTION INTRAMUSCULAR; INTRAVENOUS; SUBCUTANEOUS
Status: DISCONTINUED | OUTPATIENT
Start: 2018-01-24 | End: 2018-02-01 | Stop reason: HOSPADM

## 2018-01-24 RX ORDER — SODIUM CHLORIDE 9 MG/ML
100 INJECTION, SOLUTION INTRAVENOUS CONTINUOUS
Status: DISCONTINUED | OUTPATIENT
Start: 2018-01-25 | End: 2018-01-25

## 2018-01-24 RX ORDER — LORAZEPAM 1 MG/1
1 TABLET ORAL
Status: DISCONTINUED | OUTPATIENT
Start: 2018-01-24 | End: 2018-01-25

## 2018-01-24 RX ORDER — NALOXONE HYDROCHLORIDE 0.4 MG/ML
0.1 INJECTION, SOLUTION INTRAMUSCULAR; INTRAVENOUS; SUBCUTANEOUS AS NEEDED
Status: DISCONTINUED | OUTPATIENT
Start: 2018-01-24 | End: 2018-01-25

## 2018-01-24 RX ORDER — IBUPROFEN 200 MG
1 TABLET ORAL DAILY
Status: DISCONTINUED | OUTPATIENT
Start: 2018-01-25 | End: 2018-02-01 | Stop reason: HOSPADM

## 2018-01-24 RX ORDER — LIDOCAINE HYDROCHLORIDE 20 MG/ML
JELLY TOPICAL ONCE
Status: COMPLETED | OUTPATIENT
Start: 2018-01-24 | End: 2018-01-24

## 2018-01-24 RX ORDER — LORAZEPAM 2 MG/ML
2 INJECTION INTRAMUSCULAR
Status: DISCONTINUED | OUTPATIENT
Start: 2018-01-24 | End: 2018-01-26

## 2018-01-24 RX ORDER — POLYETHYLENE GLYCOL 3350 17 G/17G
17 POWDER, FOR SOLUTION ORAL DAILY
COMMUNITY
End: 2018-02-01

## 2018-01-24 RX ORDER — ACETAMINOPHEN 325 MG/1
650 TABLET ORAL
Status: DISCONTINUED | OUTPATIENT
Start: 2018-01-24 | End: 2018-01-24

## 2018-01-24 RX ORDER — SODIUM CHLORIDE 0.9 % (FLUSH) 0.9 %
5-10 SYRINGE (ML) INJECTION EVERY 8 HOURS
Status: DISCONTINUED | OUTPATIENT
Start: 2018-01-24 | End: 2018-02-01 | Stop reason: HOSPADM

## 2018-01-24 RX ORDER — FENTANYL 25 UG/1
1 PATCH TRANSDERMAL
COMMUNITY

## 2018-01-24 RX ADMIN — LIDOCAINE HYDROCHLORIDE: 20 JELLY TOPICAL at 22:36

## 2018-01-24 RX ADMIN — Medication 10 ML: at 22:38

## 2018-01-24 RX ADMIN — Medication 10 ML: at 23:42

## 2018-01-24 RX ADMIN — IOPAMIDOL 100 ML: 612 INJECTION, SOLUTION INTRAVENOUS at 20:13

## 2018-01-24 RX ADMIN — ENOXAPARIN SODIUM 40 MG: 40 INJECTION SUBCUTANEOUS at 22:37

## 2018-01-24 RX ADMIN — HYDROMORPHONE HYDROCHLORIDE 1 MG: 1 INJECTION, SOLUTION INTRAMUSCULAR; INTRAVENOUS; SUBCUTANEOUS at 15:15

## 2018-01-24 RX ADMIN — PROMETHAZINE HYDROCHLORIDE 25 MG: 25 INJECTION INTRAMUSCULAR; INTRAVENOUS at 22:36

## 2018-01-24 RX ADMIN — HYDROMORPHONE HYDROCHLORIDE 1 MG: 1 INJECTION, SOLUTION INTRAMUSCULAR; INTRAVENOUS; SUBCUTANEOUS at 19:27

## 2018-01-24 RX ADMIN — SODIUM CHLORIDE 1000 ML: 900 INJECTION, SOLUTION INTRAVENOUS at 15:21

## 2018-01-24 RX ADMIN — PROMETHAZINE HYDROCHLORIDE 25 MG: 25 INJECTION INTRAMUSCULAR; INTRAVENOUS at 15:20

## 2018-01-24 RX ADMIN — HYDROMORPHONE HYDROCHLORIDE 1 MG: 1 INJECTION, SOLUTION INTRAMUSCULAR; INTRAVENOUS; SUBCUTANEOUS at 16:56

## 2018-01-24 RX ADMIN — LORAZEPAM 1 MG: 2 INJECTION INTRAMUSCULAR; INTRAVENOUS at 22:38

## 2018-01-24 RX ADMIN — DIATRIZOATE MEGLUMINE AND DIATRIZOATE SODIUM 30 ML: 600; 100 SOLUTION ORAL; RECTAL at 17:40

## 2018-01-24 NOTE — ED TRIAGE NOTES
Bought in by medics pt c/o stomach pain. Pt suffers with mestatic ca. Has a fentnyl patch on and a prescription for Oxy that was taken earlier today. Pt states he suffers with an obstructed bowels.

## 2018-01-24 NOTE — ED PROVIDER NOTES
EMERGENCY DEPARTMENT HISTORY AND PHYSICAL EXAM    3:04 PM      Date: 1/24/2018  Patient Name: Nasir Townsend    History of Presenting Illness     Chief Complaint   Patient presents with    Abdominal Pain         History Provided By: Patient    Chief Complaint: Abdominal Pain   Duration:  Days  Timing:  Constant and Worsening  Location: Diffused throughout abdomen   Quality: N/A  Severity: Severe  Modifying Factors: None   Associated Symptoms: nausea, vomiting, diarrhea      Additional History (Context): Nasir Townsend is a 57 y.o. male with hx of HLD, bowel obstruction, metastatic cancer in bladder, lungs and spine presenting to the ED with c/o constant, worsening diffused abdominal pain that began a couple days ago. Pt reports he has not had a BM for a couple of days. Notes he had nausea and vomiting yesterday, but has not had a vomiting episode today. Pt denies CP, SOB, fever, chills, diarrhea or urinary sx. Associated sx include nausea, vomiting, and constipation. Severity is severe. Notes he has had no relief with Fentanyl patch. Hospice representative present at bedside as well as brother in law. Representative did not want to start pt in hospice like this because pt was in too much pain. No other sx or complaints given at this time.        PCP: Leila Hart MD      Past History     Past Medical History:  Past Medical History:   Diagnosis Date    Acute sinusitis, unspecified     Alcohol abuse, unspecified     Bladder cancer ([facility] [address] 2013    chemo & radiation    Cough     Depressive disorder     Hematuria     HLD (hyperlipidemia)     Impotence of organic origin     Insomnia, unspecified     Other abnormal glucose     Other seborrheic keratosis     Pain in joint, shoulder region        Past Surgical History:  Past Surgical History:   Procedure Laterality Date    COLONOSCOPY N/A 1/8/2018    COLONOSCOPY with disimpaction performed by Levi Nava MD at [address] [facility]  CYSTOURETHROSCOPY,BIOPSIES  12/9/2014    Dr. Karmen Tijerina    HX COLONOSCOPY      HX OTHER SURGICAL  01/04/2017    Karen (R) arm tendon, had surgery at [address]  01/04/13    [facility], TURBT - low-grade stage TA, Dr Ivonne Appiah  05/27/14    [facility], Cysto-TURP, Transurethral resection/multiple bladder biopsies - Papillary Urothelial Hyperplasia, Dr Sly Chaparro  1/29/15    Dr. Karmen Tijerina - clot evacuation/fulguration       Family History:  Family History   Problem Relation Age of Onset    Hypertension Mother     Kidney Disease Father     Heart Disease Father     Diabetes Sister     Hypertension Brother     Stroke Brother        Social History:  Social History   Substance Use Topics    Smoking status: Current Some Day Smoker     Packs/day: 1.00     Years: 43.00     Types: Cigarettes    Smokeless tobacco: Never Used      Comment: using electronic cigarettes & nicotine patch    Alcohol use 0.0 oz/week      Comment: occassional       Allergies: Allergies   Allergen Reactions    Codeine Nausea and Vomiting     \"ONLY IN VERY HIGH DOSES\"        Macrodantin [Nitrofurantoin Macrocrystal] Diarrhea, Nausea and Vomiting and Other (comments)     Made pt feel very confused    Other Plant, Animal, Environmental Other (comments)    Oxycodone Nausea and Vomiting     Other reaction(s): gi distress  \"ONLY IN VERY HIGH DOSES\"  \"ONLY IN VERY HIGH DOSES\"      Percocet [Oxycodone-Acetaminophen] Nausea and Vomiting     \"ONLY IN VERY HIGH DOSES\"        Pollen Extracts Runny Nose    Pravachol [Pravastatin] Nausea Only     Other reaction(s): gi distress    Vicodin [Hydrocodone-Acetaminophen] Nausea and Vomiting     Other reaction(s): gi distress  \"ONLY IN VERY HIGH DOSES\"  \"ONLY IN VERY HIGH DOSES\"      Zoloft [Sertraline] Nausea and Vomiting     Other reaction(s): gi distress         Review of Systems       Review of Systems   Constitutional: Negative for fever.    HENT: Negative for sore throat. Eyes: Negative for redness and visual disturbance. Respiratory: Negative for shortness of breath and wheezing. Cardiovascular: Negative for chest pain. Gastrointestinal: Positive for abdominal pain, constipation, nausea and vomiting. Endocrine: Negative for polyuria. Genitourinary: Negative for dysuria. Musculoskeletal: Negative for arthralgias and neck stiffness. Skin: Negative for rash. Neurological: Negative for headaches. All other systems reviewed and are negative. Physical Exam     Visit Vitals    /67 (BP 1 Location: Left arm, BP Patient Position: At rest)    Pulse 75    Temp 98 °F (36.7 °C)    Resp 18    SpO2 96%         Physical Exam   Constitutional: He is oriented to person, place, and time. He appears well-developed and well-nourished. No distress. HENT:   Head: Normocephalic and atraumatic. Mouth/Throat: Oropharynx is clear and moist.   Eyes: Conjunctivae are normal. Pupils are equal, round, and reactive to light. No scleral icterus. Neck: Normal range of motion. Neck supple. Cardiovascular: Normal rate and intact distal pulses. Capillary refill < 3 seconds   Pulmonary/Chest: Effort normal and breath sounds normal. No respiratory distress. He has no wheezes. Lungs are clear    Abdominal: Soft. Bowel sounds are normal. He exhibits distension. There is no tenderness. Bowel sounds in all 4 quadrants  Distended abdomen   Diffused abdominal tenderness with guarding   Obvious pain, extremous      Musculoskeletal: Normal range of motion. He exhibits no edema. Lymphadenopathy:     He has no cervical adenopathy. Neurological: He is alert and oriented to person, place, and time. No cranial nerve deficit. Skin: Skin is warm and dry. He is not diaphoretic. Nursing note and vitals reviewed.         Diagnostic Study Results     Labs -  Recent Results (from the past 12 hour(s))   CBC WITH AUTOMATED DIFF    Collection Time: 01/24/18  3:45 PM   Result Value Ref Range    WBC 7.8 4.6 - 13.2 K/uL    RBC 5.02 4.70 - 5.50 M/uL    HGB 16.8 (H) 13.0 - 16.0 g/dL    HCT 46.8 36.0 - 48.0 %    MCV 93.2 74.0 - 97.0 FL    MCH 33.5 24.0 - 34.0 PG    MCHC 35.9 31.0 - 37.0 g/dL    RDW 12.8 11.6 - 14.5 %    PLATELET 250 903 - 378 K/uL    MPV 9.9 9.2 - 11.8 FL    NEUTROPHILS 72 40 - 73 %    LYMPHOCYTES 16 (L) 21 - 52 %    MONOCYTES 11 (H) 3 - 10 %    EOSINOPHILS 1 0 - 5 %    BASOPHILS 0 0 - 2 %    ABS. NEUTROPHILS 5.6 1.8 - 8.0 K/UL    ABS. LYMPHOCYTES 1.2 0.9 - 3.6 K/UL    ABS. MONOCYTES 0.8 0.05 - 1.2 K/UL    ABS. EOSINOPHILS 0.1 0.0 - 0.4 K/UL    ABS. BASOPHILS 0.0 0.0 - 0.1 K/UL    DF AUTOMATED     METABOLIC PANEL, COMPREHENSIVE    Collection Time: 01/24/18  3:45 PM   Result Value Ref Range    Sodium 138 136 - 145 mmol/L    Potassium 3.6 3.5 - 5.5 mmol/L    Chloride 99 (L) 100 - 108 mmol/L    CO2 34 (H) 21 - 32 mmol/L    Anion gap 5 3.0 - 18 mmol/L    Glucose 142 (H) 74 - 99 mg/dL    BUN 20 (H) 7.0 - 18 MG/DL    Creatinine 0.99 0.6 - 1.3 MG/DL    BUN/Creatinine ratio 20 12 - 20      GFR est AA >60 >60 ml/min/1.73m2    GFR est non-AA >60 >60 ml/min/1.73m2    Calcium 9.6 8.5 - 10.1 MG/DL    Bilirubin, total 1.0 0.2 - 1.0 MG/DL    ALT (SGPT) 20 16 - 61 U/L    AST (SGOT) 28 15 - 37 U/L    Alk. phosphatase 75 45 - 117 U/L    Protein, total 7.7 6.4 - 8.2 g/dL    Albumin 3.3 (L) 3.4 - 5.0 g/dL    Globulin 4.4 (H) 2.0 - 4.0 g/dL    A-G Ratio 0.8 0.8 - 1.7     LIPASE    Collection Time: 01/24/18  3:45 PM   Result Value Ref Range    Lipase 67 (L) 73 - 393 U/L   POC LACTIC ACID    Collection Time: 01/24/18  3:52 PM   Result Value Ref Range    Lactic Acid (POC) 2.3 (HH) 0.4 - 2.0 mmol/L   POC LACTIC ACID    Collection Time: 01/24/18  7:36 PM   Result Value Ref Range    Lactic Acid (POC) 0.7 0.4 - 2.0 mmol/L       Radiologic Studies -   XR ABD (KUB)      IMPRESSION  Ileus vs. evolving obstruction involving the distal colon-rectum.          Medical Decision Making   I am the first provider for this patient. I reviewed the vital signs, available nursing notes, past medical history, past surgical history, family history and social history. Vital Signs-Reviewed the patient's vital signs. Pulse Oximetry Analysis -  96% on room air, stable     Cardiac Monitor:  Rate: 75   Rhythm:  Normal Sinus Rhythm     Records Reviewed: Nursing Notes and Old Medical Records (Time of Review: 3:04 PM)    Provider Notes (Medical Decision Making): DDX: bowel obstruction, cancer pain, worsening mets, constipation, biliary disease, pancreatitis, bowel perforation, infectious, metabolic. Will get CT scan of abdomen, labs. Give pain medication, IV fluids and nausea medications. MDM  Number of Diagnoses or Management Options      Medications   HYDROmorphone (PF) (DILAUDID) injection 1 mg (1 mg IntraVENous Given 1/24/18 1515)   promethazine (PHENERGAN) 25 mg in 0.9% sodium chloride 50 mL IVPB (25 mg IntraVENous Given 1/24/18 1520)   sodium chloride 0.9 % bolus infusion 1,000 mL (1,000 mL IntraVENous New Bag 1/24/18 1521)   HYDROmorphone (PF) (DILAUDID) injection 1 mg (1 mg IntraVENous Given 1/24/18 1656)   diatrizoate meglumine-d.sodium (MD-GASTROVIEW,GASTROGRAFIN) 66-10 % contrast solution 30 mL (30 mL Oral Given 1/24/18 1740)   HYDROmorphone (PF) (DILAUDID) injection 1 mg (1 mg IntraVENous Given 1/24/18 1927)   iopamidol (ISOVUE 300) 61 % contrast injection  mL (100 mL IntraVENous Given 1/24/18 2013)         ED Course: Progress Notes, Reevaluation, and Consults:  Reassessed and still in conciderable pain, has gotten more pain meds. 8:03 PM Consult: I discussed care with Dr. Ning Humphrey ([address] [place] [address] Resident). It was a standard discussion including patient history, chief complaint, available diagnostic results, and predicted treatment course. Accepts admission. Request that the resident be called. 8:13 PM Consult: I discussed care with Dr. Rell Savage (Surgery).   It was a standard discussion including patient history, chief complaint, available diagnostic results, and predicted treatment course. Recommends NG tube and give Lidocaine spray. Accepts consult. I discussed dx's and plan with pt and he agrees with ngt and admission. Family member at bedside. 10:05 PM: Residents have seen pt in the ED. For Hospitalized Patients:    1. Hospitalization Decision Time:  The decision to hospitalize the patient was made by Dr. Yang Harrison at [facility] on 1/24/2018      Diagnosis     Clinical Impression:   1. SBO (small bowel obstruction)    2. Abdominal pain, generalized    3. Malignant neoplasm of urinary bladder, unspecified site ([facility] [address]    4. Metastatic disease ([facility] [address]    5. Non-intractable vomiting with nausea, unspecified vomiting type        Disposition: Admit     Follow-up Information     None           Patient's Medications   Start Taking    No medications on file   Continue Taking    ALPRAZOLAM (XANAX) 1 MG TABLET    take 1 tablet by mouth twice a day if needed for anxiety    CHLORHEXIDINE (PERIDEX) 0.12 % SOLUTION        GABAPENTIN (NEURONTIN) 300 MG CAPSULE        LIDOCAINE (LIDODERM) 5 %        MEPERIDINE (DEMEROL) 50 MG TABLET    take 1 tablet by mouth every 4 to 6 hours if needed for pain    METHOCARBAMOL (ROBAXIN) 500 MG TABLET    Take 500 mg by mouth four (4) times daily as needed. Indications: MUSCLE SPASM    MIRABEGRON ER (MYRBETRIQ) 25 MG ER TABLET    Take 1 Tab by mouth daily. OXYBUTYNIN CHLORIDE XL (DITROPAN XL) 10 MG CR TABLET    Take 1 Tab by mouth daily. OXYCODONE IR (ROXICODONE) 10 MG TAB IMMEDIATE RELEASE TABLET    take 1 tablet by mouth four times a day if needed    TRAMADOL (ULTRAM) 50 MG TABLET    Take 1 Tab by mouth every six (6) hours as needed for Pain.     TRAZODONE (DESYREL) 50 MG TABLET    take 1 tablet by mouth daily   These Medications have changed    No medications on file   Stop Taking    No medications on file _______________________________    Attestations:  Keerthi Herrera Jeffy Da acting as a scribe for and in the presence of Missy Mcdaniel DO      January 24, 2018 at 3:04 PM       Provider Attestation:      I personally performed the services described in the documentation, reviewed the documentation, as recorded by the scribe in my presence, and it accurately and completely records my words and actions.  January 24, 2018 at 3:04 PM - Missy Mcdaniel DO    _______________________________

## 2018-01-24 NOTE — Clinical Note
Status[de-identified] Inpatient [101] Type of Bed: Medical [8] Inpatient Hospitalization Certified Necessary for the Following Reasons: 3. Patient receiving treatment that can only be provided in an inpatient setting (further clarification in H&P documentation) Admitting Diagnosis: SBO (small bowel obstruction) [825497] Admitting Diagnosis: Abdominal pain [476698] Admitting Diagnosis: Bladder cancer [facility]) [[id number]] Admitting Diagnosis: [facility] [[id number]] Admitting Physician: Paulina Rodriguez [[id number]] Attending Physician: Paulina Rodriguez [[id number]] Estimated Length of Stay: 2 Midnights Discharge Plan[de-identified] Home with Office Follow-up

## 2018-01-25 ENCOUNTER — APPOINTMENT (OUTPATIENT)
Dept: GENERAL RADIOLOGY | Age: 63
DRG: 329 | End: 2018-01-25
Attending: SURGERY
Payer: MEDICARE

## 2018-01-25 ENCOUNTER — HOSPICE ADMISSION (OUTPATIENT)
Dept: HOSPICE | Facility: HOSPICE | Age: 63
End: 2018-01-25

## 2018-01-25 ENCOUNTER — ANESTHESIA (OUTPATIENT)
Dept: SURGERY | Age: 63
DRG: 329 | End: 2018-01-25
Payer: MEDICARE

## 2018-01-25 ENCOUNTER — ANESTHESIA EVENT (OUTPATIENT)
Dept: SURGERY | Age: 63
DRG: 329 | End: 2018-01-25
Payer: MEDICARE

## 2018-01-25 LAB
ANION GAP SERPL CALC-SCNC: 7 MMOL/L (ref 3–18)
BASOPHILS # BLD: 0 K/UL (ref 0–0.1)
BASOPHILS NFR BLD: 1 % (ref 0–2)
BUN SERPL-MCNC: 18 MG/DL (ref 7–18)
BUN/CREAT SERPL: 28 (ref 12–20)
CALCIUM SERPL-MCNC: 8.2 MG/DL (ref 8.5–10.1)
CHLORIDE SERPL-SCNC: 106 MMOL/L (ref 100–108)
CO2 SERPL-SCNC: 28 MMOL/L (ref 21–32)
CREAT SERPL-MCNC: 0.65 MG/DL (ref 0.6–1.3)
DIFFERENTIAL METHOD BLD: ABNORMAL
EOSINOPHIL # BLD: 0.2 K/UL (ref 0–0.4)
EOSINOPHIL NFR BLD: 3 % (ref 0–5)
ERYTHROCYTE [DISTWIDTH] IN BLOOD BY AUTOMATED COUNT: 12.7 % (ref 11.6–14.5)
GLUCOSE SERPL-MCNC: 97 MG/DL (ref 74–99)
HCT VFR BLD AUTO: 43.9 % (ref 36–48)
HGB BLD-MCNC: 15.4 G/DL (ref 13–16)
LYMPHOCYTES # BLD: 1.4 K/UL (ref 0.9–3.6)
LYMPHOCYTES NFR BLD: 24 % (ref 21–52)
MCH RBC QN AUTO: 33.2 PG (ref 24–34)
MCHC RBC AUTO-ENTMCNC: 35.1 G/DL (ref 31–37)
MCV RBC AUTO: 94.6 FL (ref 74–97)
MONOCYTES # BLD: 0.7 K/UL (ref 0.05–1.2)
MONOCYTES NFR BLD: 12 % (ref 3–10)
NEUTS SEG # BLD: 3.5 K/UL (ref 1.8–8)
NEUTS SEG NFR BLD: 60 % (ref 40–73)
PLATELET # BLD AUTO: 243 K/UL (ref 135–420)
PMV BLD AUTO: 10.2 FL (ref 9.2–11.8)
POTASSIUM SERPL-SCNC: 2.8 MMOL/L (ref 3.5–5.5)
RBC # BLD AUTO: 4.64 M/UL (ref 4.7–5.5)
SODIUM SERPL-SCNC: 141 MMOL/L (ref 136–145)
WBC # BLD AUTO: 5.7 K/UL (ref 4.6–13.2)

## 2018-01-25 PROCEDURE — 80048 BASIC METABOLIC PNL TOTAL CA: CPT | Performed by: FAMILY MEDICINE

## 2018-01-25 PROCEDURE — 36415 COLL VENOUS BLD VENIPUNCTURE: CPT | Performed by: FAMILY MEDICINE

## 2018-01-25 PROCEDURE — 74011000250 HC RX REV CODE- 250

## 2018-01-25 PROCEDURE — 74011250636 HC RX REV CODE- 250/636

## 2018-01-25 PROCEDURE — 65270000029 HC RM PRIVATE

## 2018-01-25 PROCEDURE — 74011000258 HC RX REV CODE- 258: Performed by: FAMILY MEDICINE

## 2018-01-25 PROCEDURE — 76010000153 HC OR TIME 1.5 TO 2 HR: Performed by: SURGERY

## 2018-01-25 PROCEDURE — 77030011264 HC ELECTRD BLD EXT COVD -A: Performed by: SURGERY

## 2018-01-25 PROCEDURE — 77030031139 HC SUT VCRL2 J&J -A: Performed by: SURGERY

## 2018-01-25 PROCEDURE — 77030034850: Performed by: SURGERY

## 2018-01-25 PROCEDURE — 77030003028 HC SUT VCRL J&J -A: Performed by: SURGERY

## 2018-01-25 PROCEDURE — 97161 PT EVAL LOW COMPLEX 20 MIN: CPT

## 2018-01-25 PROCEDURE — 77030002888 HC SUT CHRMC J&J -A: Performed by: SURGERY

## 2018-01-25 PROCEDURE — 0D1M0Z4 BYPASS DESCENDING COLON TO CUTANEOUS, OPEN APPROACH: ICD-10-PCS | Performed by: SURGERY

## 2018-01-25 PROCEDURE — 77030002996 HC SUT SLK J&J -A: Performed by: SURGERY

## 2018-01-25 PROCEDURE — 74011250636 HC RX REV CODE- 250/636: Performed by: FAMILY MEDICINE

## 2018-01-25 PROCEDURE — 77030002916 HC SUT ETHLN J&J -A: Performed by: SURGERY

## 2018-01-25 PROCEDURE — 74011250636 HC RX REV CODE- 250/636: Performed by: NURSE ANESTHETIST, CERTIFIED REGISTERED

## 2018-01-25 PROCEDURE — 77030002966 HC SUT PDS J&J -A: Performed by: SURGERY

## 2018-01-25 PROCEDURE — 74011250636 HC RX REV CODE- 250/636: Performed by: ANESTHESIOLOGY

## 2018-01-25 PROCEDURE — 74270 X-RAY XM COLON 1CNTRST STD: CPT

## 2018-01-25 PROCEDURE — 74011636320 HC RX REV CODE- 636/320: Performed by: EMERGENCY MEDICINE

## 2018-01-25 PROCEDURE — 97530 THERAPEUTIC ACTIVITIES: CPT

## 2018-01-25 PROCEDURE — 77030034818 HC STPLR INT GIA COVD -C: Performed by: SURGERY

## 2018-01-25 PROCEDURE — 76210000006 HC OR PH I REC 0.5 TO 1 HR: Performed by: SURGERY

## 2018-01-25 PROCEDURE — 85025 COMPLETE CBC W/AUTO DIFF WBC: CPT | Performed by: FAMILY MEDICINE

## 2018-01-25 PROCEDURE — 77030008683 HC TU ET CUF COVD -A: Performed by: ANESTHESIOLOGY

## 2018-01-25 PROCEDURE — 74011000258 HC RX REV CODE- 258: Performed by: STUDENT IN AN ORGANIZED HEALTH CARE EDUCATION/TRAINING PROGRAM

## 2018-01-25 PROCEDURE — 74011250636 HC RX REV CODE- 250/636: Performed by: STUDENT IN AN ORGANIZED HEALTH CARE EDUCATION/TRAINING PROGRAM

## 2018-01-25 PROCEDURE — 77030012048 HC BG OST DRN W/F BMS -A: Performed by: SURGERY

## 2018-01-25 PROCEDURE — 77030008467 HC STPLR SKN COVD -B: Performed by: SURGERY

## 2018-01-25 PROCEDURE — 74011250637 HC RX REV CODE- 250/637: Performed by: STUDENT IN AN ORGANIZED HEALTH CARE EDUCATION/TRAINING PROGRAM

## 2018-01-25 PROCEDURE — 76060000034 HC ANESTHESIA 1.5 TO 2 HR: Performed by: SURGERY

## 2018-01-25 PROCEDURE — 74011250636 HC RX REV CODE- 250/636: Performed by: SURGERY

## 2018-01-25 PROCEDURE — 77030011640 HC PAD GRND REM COVD -A: Performed by: SURGERY

## 2018-01-25 PROCEDURE — 74011000250 HC RX REV CODE- 250: Performed by: STUDENT IN AN ORGANIZED HEALTH CARE EDUCATION/TRAINING PROGRAM

## 2018-01-25 PROCEDURE — 77030025240 HC RELD STPL GIA 2 COVD -C: Performed by: SURGERY

## 2018-01-25 PROCEDURE — 84295 ASSAY OF SERUM SODIUM: CPT

## 2018-01-25 PROCEDURE — 77030013079 HC BLNKT BAIR HGGR 3M -A: Performed by: ANESTHESIOLOGY

## 2018-01-25 PROCEDURE — 77030020782 HC GWN BAIR PAWS FLX 3M -B: Performed by: SURGERY

## 2018-01-25 PROCEDURE — 77030019938 HC TBNG IV PCA ICUM -A

## 2018-01-25 RX ORDER — DEXAMETHASONE SODIUM PHOSPHATE 4 MG/ML
INJECTION, SOLUTION INTRA-ARTICULAR; INTRALESIONAL; INTRAMUSCULAR; INTRAVENOUS; SOFT TISSUE AS NEEDED
Status: DISCONTINUED | OUTPATIENT
Start: 2018-01-25 | End: 2018-01-25 | Stop reason: HOSPADM

## 2018-01-25 RX ORDER — DIPHENHYDRAMINE HYDROCHLORIDE 50 MG/ML
25 INJECTION, SOLUTION INTRAMUSCULAR; INTRAVENOUS
Status: DISCONTINUED | OUTPATIENT
Start: 2018-01-25 | End: 2018-01-25 | Stop reason: HOSPADM

## 2018-01-25 RX ORDER — KETOROLAC TROMETHAMINE 30 MG/ML
INJECTION, SOLUTION INTRAMUSCULAR; INTRAVENOUS
Status: COMPLETED
Start: 2018-01-25 | End: 2018-01-25

## 2018-01-25 RX ORDER — GLYCOPYRROLATE 0.2 MG/ML
INJECTION INTRAMUSCULAR; INTRAVENOUS AS NEEDED
Status: DISCONTINUED | OUTPATIENT
Start: 2018-01-25 | End: 2018-01-25 | Stop reason: HOSPADM

## 2018-01-25 RX ORDER — SODIUM CHLORIDE 0.9 % (FLUSH) 0.9 %
5-10 SYRINGE (ML) INJECTION AS NEEDED
Status: DISCONTINUED | OUTPATIENT
Start: 2018-01-24 | End: 2018-02-01 | Stop reason: HOSPADM

## 2018-01-25 RX ORDER — ONDANSETRON 2 MG/ML
INJECTION INTRAMUSCULAR; INTRAVENOUS AS NEEDED
Status: DISCONTINUED | OUTPATIENT
Start: 2018-01-25 | End: 2018-01-25 | Stop reason: HOSPADM

## 2018-01-25 RX ORDER — SODIUM CHLORIDE 0.9 % (FLUSH) 0.9 %
5-10 SYRINGE (ML) INJECTION EVERY 8 HOURS
Status: DISCONTINUED | OUTPATIENT
Start: 2018-01-25 | End: 2018-01-25

## 2018-01-25 RX ORDER — HYDROMORPHONE HYDROCHLORIDE 1 MG/ML
0.5 INJECTION, SOLUTION INTRAMUSCULAR; INTRAVENOUS; SUBCUTANEOUS ONCE
Status: COMPLETED | OUTPATIENT
Start: 2018-01-25 | End: 2018-01-25

## 2018-01-25 RX ORDER — FENTANYL CITRATE 50 UG/ML
50 INJECTION, SOLUTION INTRAMUSCULAR; INTRAVENOUS AS NEEDED
Status: DISCONTINUED | OUTPATIENT
Start: 2018-01-25 | End: 2018-01-25 | Stop reason: HOSPADM

## 2018-01-25 RX ORDER — KETOROLAC TROMETHAMINE 30 MG/ML
15 INJECTION, SOLUTION INTRAMUSCULAR; INTRAVENOUS EVERY 8 HOURS
Status: COMPLETED | OUTPATIENT
Start: 2018-01-25 | End: 2018-01-30

## 2018-01-25 RX ORDER — MAGNESIUM SULFATE 100 %
4 CRYSTALS MISCELLANEOUS AS NEEDED
Status: DISCONTINUED | OUTPATIENT
Start: 2018-01-25 | End: 2018-01-25 | Stop reason: HOSPADM

## 2018-01-25 RX ORDER — SUCCINYLCHOLINE CHLORIDE 20 MG/ML
INJECTION INTRAMUSCULAR; INTRAVENOUS AS NEEDED
Status: DISCONTINUED | OUTPATIENT
Start: 2018-01-25 | End: 2018-01-25 | Stop reason: HOSPADM

## 2018-01-25 RX ORDER — MIDAZOLAM HYDROCHLORIDE 1 MG/ML
INJECTION, SOLUTION INTRAMUSCULAR; INTRAVENOUS AS NEEDED
Status: DISCONTINUED | OUTPATIENT
Start: 2018-01-25 | End: 2018-01-25 | Stop reason: HOSPADM

## 2018-01-25 RX ORDER — SODIUM CHLORIDE, SODIUM LACTATE, POTASSIUM CHLORIDE, CALCIUM CHLORIDE 600; 310; 30; 20 MG/100ML; MG/100ML; MG/100ML; MG/100ML
100 INJECTION, SOLUTION INTRAVENOUS CONTINUOUS
Status: DISCONTINUED | OUTPATIENT
Start: 2018-01-25 | End: 2018-01-26

## 2018-01-25 RX ORDER — INSULIN LISPRO 100 [IU]/ML
INJECTION, SOLUTION INTRAVENOUS; SUBCUTANEOUS ONCE
Status: DISCONTINUED | OUTPATIENT
Start: 2018-01-25 | End: 2018-01-25 | Stop reason: HOSPADM

## 2018-01-25 RX ORDER — METOPROLOL TARTRATE 5 MG/5ML
INJECTION INTRAVENOUS AS NEEDED
Status: DISCONTINUED | OUTPATIENT
Start: 2018-01-25 | End: 2018-01-25 | Stop reason: HOSPADM

## 2018-01-25 RX ORDER — DEXTROSE 50 % IN WATER (D50W) INTRAVENOUS SYRINGE
25-50 AS NEEDED
Status: DISCONTINUED | OUTPATIENT
Start: 2018-01-25 | End: 2018-01-25 | Stop reason: HOSPADM

## 2018-01-25 RX ORDER — NEOSTIGMINE METHYLSULFATE 5 MG/5 ML
SYRINGE (ML) INTRAVENOUS AS NEEDED
Status: DISCONTINUED | OUTPATIENT
Start: 2018-01-25 | End: 2018-01-25 | Stop reason: HOSPADM

## 2018-01-25 RX ORDER — FAMOTIDINE 20 MG/50ML
20 INJECTION, SOLUTION INTRAVENOUS EVERY 12 HOURS
Status: DISCONTINUED | OUTPATIENT
Start: 2018-01-25 | End: 2018-01-25

## 2018-01-25 RX ORDER — HYDROMORPHONE HYDROCHLORIDE 1 MG/ML
INJECTION, SOLUTION INTRAMUSCULAR; INTRAVENOUS; SUBCUTANEOUS
Status: DISPENSED
Start: 2018-01-25 | End: 2018-01-26

## 2018-01-25 RX ORDER — LORAZEPAM 2 MG/ML
1 INJECTION INTRAMUSCULAR AS NEEDED
Status: DISCONTINUED | OUTPATIENT
Start: 2018-01-25 | End: 2018-01-25 | Stop reason: HOSPADM

## 2018-01-25 RX ORDER — HYDROMORPHONE HYDROCHLORIDE 2 MG/ML
0.5 INJECTION, SOLUTION INTRAMUSCULAR; INTRAVENOUS; SUBCUTANEOUS
Status: DISCONTINUED | OUTPATIENT
Start: 2018-01-25 | End: 2018-01-25 | Stop reason: HOSPADM

## 2018-01-25 RX ORDER — LIDOCAINE HYDROCHLORIDE 20 MG/ML
INJECTION, SOLUTION EPIDURAL; INFILTRATION; INTRACAUDAL; PERINEURAL AS NEEDED
Status: DISCONTINUED | OUTPATIENT
Start: 2018-01-25 | End: 2018-01-25 | Stop reason: HOSPADM

## 2018-01-25 RX ORDER — ROCURONIUM BROMIDE 10 MG/ML
INJECTION, SOLUTION INTRAVENOUS AS NEEDED
Status: DISCONTINUED | OUTPATIENT
Start: 2018-01-25 | End: 2018-01-25 | Stop reason: HOSPADM

## 2018-01-25 RX ORDER — FENTANYL CITRATE 50 UG/ML
INJECTION, SOLUTION INTRAMUSCULAR; INTRAVENOUS AS NEEDED
Status: DISCONTINUED | OUTPATIENT
Start: 2018-01-25 | End: 2018-01-25 | Stop reason: HOSPADM

## 2018-01-25 RX ORDER — PROPOFOL 10 MG/ML
INJECTION, EMULSION INTRAVENOUS AS NEEDED
Status: DISCONTINUED | OUTPATIENT
Start: 2018-01-25 | End: 2018-01-25 | Stop reason: HOSPADM

## 2018-01-25 RX ORDER — SODIUM CHLORIDE, SODIUM LACTATE, POTASSIUM CHLORIDE, CALCIUM CHLORIDE 600; 310; 30; 20 MG/100ML; MG/100ML; MG/100ML; MG/100ML
75 INJECTION, SOLUTION INTRAVENOUS CONTINUOUS
Status: DISCONTINUED | OUTPATIENT
Start: 2018-01-25 | End: 2018-01-25 | Stop reason: HOSPADM

## 2018-01-25 RX ADMIN — SODIUM CHLORIDE 100 ML/HR: 900 INJECTION, SOLUTION INTRAVENOUS at 01:54

## 2018-01-25 RX ADMIN — LIDOCAINE HYDROCHLORIDE: 10 INJECTION, SOLUTION EPIDURAL; INFILTRATION; INTRACAUDAL; PERINEURAL at 08:47

## 2018-01-25 RX ADMIN — KETOROLAC TROMETHAMINE 15 MG: 30 INJECTION, SOLUTION INTRAMUSCULAR at 20:13

## 2018-01-25 RX ADMIN — HYDROMORPHONE HYDROCHLORIDE: 10 INJECTION, SOLUTION INTRAMUSCULAR; INTRAVENOUS; SUBCUTANEOUS at 01:50

## 2018-01-25 RX ADMIN — HYDROMORPHONE HYDROCHLORIDE 0.5 MG: 2 INJECTION, SOLUTION INTRAMUSCULAR; INTRAVENOUS; SUBCUTANEOUS at 19:58

## 2018-01-25 RX ADMIN — PROMETHAZINE HYDROCHLORIDE 25 MG: 25 INJECTION INTRAMUSCULAR; INTRAVENOUS at 11:10

## 2018-01-25 RX ADMIN — METOPROLOL TARTRATE 0.5 MG: 5 INJECTION INTRAVENOUS at 19:14

## 2018-01-25 RX ADMIN — ENOXAPARIN SODIUM 40 MG: 40 INJECTION SUBCUTANEOUS at 21:34

## 2018-01-25 RX ADMIN — AMPICILLIN AND SULBACTAM 3 G: 1; 2 INJECTION, POWDER, FOR SOLUTION INTRAMUSCULAR; INTRAVENOUS at 17:44

## 2018-01-25 RX ADMIN — HYDROMORPHONE HYDROCHLORIDE 0.5 MG: 2 INJECTION, SOLUTION INTRAMUSCULAR; INTRAVENOUS; SUBCUTANEOUS at 20:10

## 2018-01-25 RX ADMIN — DEXAMETHASONE SODIUM PHOSPHATE 4 MG: 4 INJECTION, SOLUTION INTRA-ARTICULAR; INTRALESIONAL; INTRAMUSCULAR; INTRAVENOUS; SOFT TISSUE at 18:37

## 2018-01-25 RX ADMIN — SODIUM CHLORIDE, SODIUM LACTATE, POTASSIUM CHLORIDE, AND CALCIUM CHLORIDE 100 ML/HR: 600; 310; 30; 20 INJECTION, SOLUTION INTRAVENOUS at 21:35

## 2018-01-25 RX ADMIN — SODIUM CHLORIDE 100 ML/HR: 900 INJECTION, SOLUTION INTRAVENOUS at 04:44

## 2018-01-25 RX ADMIN — FENTANYL CITRATE 50 MCG: 50 INJECTION INTRAMUSCULAR; INTRAVENOUS at 20:35

## 2018-01-25 RX ADMIN — LORAZEPAM 1 MG: 2 INJECTION INTRAMUSCULAR; INTRAVENOUS at 20:22

## 2018-01-25 RX ADMIN — MIDAZOLAM HYDROCHLORIDE 2 MG: 1 INJECTION, SOLUTION INTRAMUSCULAR; INTRAVENOUS at 18:12

## 2018-01-25 RX ADMIN — ROCURONIUM BROMIDE 10 MG: 10 INJECTION, SOLUTION INTRAVENOUS at 18:16

## 2018-01-25 RX ADMIN — LIDOCAINE HYDROCHLORIDE: 10 INJECTION, SOLUTION EPIDURAL; INFILTRATION; INTRACAUDAL; PERINEURAL at 11:15

## 2018-01-25 RX ADMIN — HYDROMORPHONE HYDROCHLORIDE 0.5 MG: 1 INJECTION, SOLUTION INTRAMUSCULAR; INTRAVENOUS; SUBCUTANEOUS at 17:37

## 2018-01-25 RX ADMIN — HYDROMORPHONE HYDROCHLORIDE 0.5 MG: 2 INJECTION, SOLUTION INTRAMUSCULAR; INTRAVENOUS; SUBCUTANEOUS at 20:18

## 2018-01-25 RX ADMIN — HYDROMORPHONE HYDROCHLORIDE 0.5 MG: 2 INJECTION, SOLUTION INTRAMUSCULAR; INTRAVENOUS; SUBCUTANEOUS at 20:05

## 2018-01-25 RX ADMIN — FENTANYL CITRATE 100 MCG: 50 INJECTION, SOLUTION INTRAMUSCULAR; INTRAVENOUS at 18:38

## 2018-01-25 RX ADMIN — ONDANSETRON 4 MG: 2 INJECTION INTRAMUSCULAR; INTRAVENOUS at 18:53

## 2018-01-25 RX ADMIN — DIATRIZOATE MEGLUMINE AND DIATRIZOATE SODIUM 960 ML: 660; 100 LIQUID ORAL; RECTAL at 14:00

## 2018-01-25 RX ADMIN — ROCURONIUM BROMIDE 30 MG: 10 INJECTION, SOLUTION INTRAVENOUS at 18:23

## 2018-01-25 RX ADMIN — GLYCOPYRROLATE 0.6 MG: 0.2 INJECTION INTRAMUSCULAR; INTRAVENOUS at 19:35

## 2018-01-25 RX ADMIN — METOPROLOL TARTRATE 1 MG: 5 INJECTION INTRAVENOUS at 19:28

## 2018-01-25 RX ADMIN — Medication 3 MG: at 19:35

## 2018-01-25 RX ADMIN — SODIUM CHLORIDE, SODIUM LACTATE, POTASSIUM CHLORIDE, AND CALCIUM CHLORIDE 100 ML/HR: 600; 310; 30; 20 INJECTION, SOLUTION INTRAVENOUS at 11:24

## 2018-01-25 RX ADMIN — FENTANYL CITRATE 100 MCG: 50 INJECTION, SOLUTION INTRAMUSCULAR; INTRAVENOUS at 18:15

## 2018-01-25 RX ADMIN — LIDOCAINE HYDROCHLORIDE 60 MG: 20 INJECTION, SOLUTION EPIDURAL; INFILTRATION; INTRACAUDAL; PERINEURAL at 18:15

## 2018-01-25 RX ADMIN — PROPOFOL 100 MG: 10 INJECTION, EMULSION INTRAVENOUS at 18:16

## 2018-01-25 RX ADMIN — Medication 10 ML: at 14:00

## 2018-01-25 RX ADMIN — SUCCINYLCHOLINE CHLORIDE 100 MG: 20 INJECTION INTRAMUSCULAR; INTRAVENOUS at 18:16

## 2018-01-25 RX ADMIN — LIDOCAINE HYDROCHLORIDE 60 MG: 20 INJECTION, SOLUTION EPIDURAL; INFILTRATION; INTRACAUDAL; PERINEURAL at 18:16

## 2018-01-25 RX ADMIN — KETOROLAC TROMETHAMINE 15 MG: 30 INJECTION, SOLUTION INTRAMUSCULAR at 21:34

## 2018-01-25 RX ADMIN — Medication 10 ML: at 22:00

## 2018-01-25 NOTE — DIABETES MGMT
GLYCEMIC CONTROL SCREENING INITIATED:    No results found for: HBA1C, HGBE8, NIA2YFEZ   Lab Results   Component Value Date/Time    Glucose 97 01/25/2018 04:52 AM         [x]  Recommend blood glucose monitoring while patient is NPO and consider IVF to sustain BG until diet can be advanced. Will continue to monitor pt for intervention.     Antwan Sandy MS, [address] [place] [address], E  Pager: [phone]

## 2018-01-25 NOTE — ANESTHESIA PREPROCEDURE EVALUATION
Anesthetic History   No history of anesthetic complications            Review of Systems / Medical History  Patient summary reviewed and pertinent labs reviewed    Pulmonary          Smoker         Neuro/Psych   Within defined limits           Cardiovascular  Within defined limits                     GI/Hepatic/Renal         Renal disease: CRI       Endo/Other    Diabetes: type 2    Anemia     Other Findings   Comments: Documentation of current medication  Current medications obtained, documented and obtained? YES      Risk Factors for Postoperative nausea/vomiting:       History of postoperative nausea/vomiting? NO       Female? NO       Motion sickness? NO       Intended opioid administration for postoperative analgesia? YES      Smoking Abstinence:  Current Smoker? YES  Elective Surgery? NO  Seen preoperatively by anesthesiologist or proxy prior to day of surgery? YES  Pt abstained from smoking 24 hours prior to anesthesia?  YES    Preventive care/screening for High Blood Pressure:  Aged 18 years and older: YES  Screened for high blood pressure: YES  Patients with high blood pressure referred to primary care provider   for BP management: YES                 Physical Exam    Airway  Mallampati: III  TM Distance: 4 - 6 cm  Neck ROM: decreased range of motion   Mouth opening: Diminished (comment)     Cardiovascular    Rhythm: irregular  Rate: normal         Dental    Dentition: Poor dentition     Pulmonary  Breath sounds clear to auscultation               Abdominal  GI exam deferred       Other Findings            Anesthetic Plan    ASA: 3  Anesthesia type: general          Induction: Intravenous and RSI  Anesthetic plan and risks discussed with: Patient

## 2018-01-25 NOTE — ROUTINE PROCESS
TRANSFER - OUT REPORT:    Verbal report given to Israel Manzano  being transferred to [address] [place] [address](unit) for change in patient condition(now)       Report consisted of patients Situation, Background, Assessment and   Recommendations(SBAR). Information from the following report(s) SBAR and ED Summary was reviewed with the receiving nurse. Lines:   Peripheral IV 01/24/18 Right Antecubital (Active)   Site Assessment Clean, dry, & intact 1/24/2018  4:59 PM   Phlebitis Assessment 0 1/24/2018  4:59 PM   Infiltration Assessment 0 1/24/2018  4:59 PM   Dressing Status Clean, dry, & intact 1/24/2018  4:59 PM   Dressing Type Transparent 1/24/2018  4:59 PM   Hub Color/Line Status Pink 1/24/2018  4:59 PM        Opportunity for questions and clarification was provided.       Patient transported with:   [employer]

## 2018-01-25 NOTE — PROGRESS NOTES
NUTRITION    Nursing Referral: [facility]  Nutrition Consult: General Nutrition Management & Supplements     RECOMMENDATIONS / PLAN:     - Recommend providing IV fluid containing dextrose while patient is NPO. - If patient unable to tolerate oral diet in the next 24-48 hours, consider starting parenteral nutrition if treatment is consistent with goals of care. - Continue RD inpatient monitoring and evaluation. NUTRITION INTERVENTIONS & DIAGNOSIS:     [x] IV fluid: LR at 100 mL/hr     Nutrition Diagnosis: Unintentional weight loss related to inadequate energy intake and altered GI function as evidenced by patient unable to tolerate po with nausea/vomiting resulting in 24 lb, 15% weight loss x 4 months. Patient meets criteria for Severe Protein Calorie Malnutrition as evidenced by:   ASPEN Malnutrition Criteria  Acute Illness, Chronic Illness, or Social/Enviornmental: Chronic illness  Energy Intake: <75% est energy req for greater than/equal to 1 month  Weight Loss: Greater than 10% x 6 mos  ASPEN Malnutrition Score - Chronic Illness: 7  Chronic Illness - Malnutrition Diagnosis: Severe malnutrition. ASSESSMENT:     Pt c/o abdominal pain, nausea and constipation with poor po intake PTA, unable to keep food down for the past 4-5 days. Developing colonic obstruction per CT, Surgery and Palliative Medicine consulted.      Average po intake adequate to meet patients estimated nutritional needs:   [] Yes     [x] No   [] Unable to determine at this time    Diet: DIET NPO      Food Allergies: NKFA  Current Appetite:   [] Good     [] Fair     [] Poor     [x] Other: NPO  Appetite/meal intake prior to admission:   [] Good     [] Fair     [x] Poor, unable to tolerate any food or drink with nausea/vomiting x 4-5 days PTA and poor to fair meal intake before then for the last several months     [] Other:  Feeding Limitations:  [] Swallowing difficulty    [] Chewing difficulty    [] Other:  Current Meal Intake: No data found.    NGT to continuous suction   BM: 1/21   Skin Integrity: WDL  Edema: none   Pertinent Medications: Reviewed: phenergan, dulcolax    Recent Labs      01/25/18   0452  01/24/18   1545   NA  141  138   K  2.8*  3.6   CL  106  99*   CO2  28  34*   GLU  97  142*   BUN  18  20*   CREA  0.65  0.99   CA  8.2*  9.6   ALB   --   3.3*   SGOT   --   28   ALT   --   20       Intake/Output Summary (Last 24 hours) at 01/25/18 1353  Last data filed at 01/25/18 0023   Gross per 24 hour   Intake                0 ml   Output                0 ml   Net                0 ml       Anthropometrics:  Ht Readings from Last 1 Encounters:   01/24/18 5' 9\" (1.753 m)     Last 3 Recorded Weights in this Encounter    01/24/18 8657   Weight: 64 kg (141 lb)     Body mass index is 20.82 kg/(m^2).           Weight History: per patient and family- significant weight loss, down from previous weight of 165 lb in September 2017 (24 lb, 15% weight loss x 4 months)    Weight Metrics 1/24/2018 1/8/2018 12/6/2017 9/28/2017 9/19/2017 6/7/2017 4/11/2017   Weight 141 lb 146 lb 150 lb 164 lb 164 lb 165 lb 165 lb   BMI 20.82 kg/m2 21.56 kg/m2 22.15 kg/m2 24.22 kg/m2 24.22 kg/m2 24.37 kg/m2 24.37 kg/m2        Admitting Diagnosis: SBO (small bowel obstruction)  Abdominal pain  Bladder cancer (HCC)  Metastatic disease (HCC)  Intractable abdominal pain  Pertinent PMHx: metastatic bladder cancer, DM, chronic constipation, alcohol abuse, chemotherapy induced neuropathy    Education Needs:        [x] None identified  [] Identified - Not appropriate at this time  []  Identified and addressed - refer to education log  Learning Limitations:   [x] None identified  [] Identified    Cultural, Lutheran & ethnic food preferences:  [x] None identified    [] Identified and addressed     ESTIMATED NUTRITION NEEDS:     Calories: 0155-3488 kcal (HBEx1.2-1.4) based on  [x] Actual BW 64 kg     [] IBW   Protein: 64-96 gm (1-1.5 gm/kg) based on  [x] Actual BW      [] IBW   Fluid: 1 mL/kcal     MONITORING & EVALUATION:     Nutrition Goal(s):   1. Nutritional needs will be met through adequate oral intake or nutrition support within the next 7 days.   Outcome:  [] Met/Ongoing    []  Not Met    [x] New/Initial Goal     Monitoring:   [x] Food and beverage intake   [x] Diet order   [x] Nutrition-focused physical findings   [x] Treatment/therapy   [] Weight   [] Enteral nutrition intake        Previous Recommendations (for follow-up assessments only):     []   Implemented       []   Not Implemented (RD to address)      [] No Longer Appropriate     [] No Recommendation Made     Discharge Planning: pending ability to tolerate po and goals of care  [x] Participated in care planning, discharge planning, & interdisciplinary rounds as appropriate      Carlita Allison, 64 [address], [address] [place] [address]    Pager: [phone]

## 2018-01-25 NOTE — ROUTINE PROCESS
Bedside and Verbal shift change report given to Shahab Walker RN (oncoming nurse) by Leonarda Ortiz RN (offgoing nurse). Report included the following information SBAR, Kardex, MAR and Recent Results.     SITUATION:    Code Status: Full Code   Reason for Admission: SBO (small bowel obstruction)   Abdominal pain   Bl[facility] ([facility]   Metastatic disease ([facility]   Intractable abdominal pain    [facility] day: 1   Problem List:       Hospital Problems  Date Reviewed: 1/24/2018          Codes Class Noted POA    SBO (small bowel obstruction) ICD-10-CM: K56.609  ICD-9-CM: 560.9  1/24/2018 Unknown        Abdominal pain ICD-10-CM: R10.9  ICD-9-CM: 789.00  1/24/2018 Unknown        Metastatic disease ([facility] [facility] ICD-10-CM: C79.9  ICD-9-CM: 199.1  1/24/2018 Unknown        Intractable abdominal pain ICD-10-CM: R10.9  ICD-9-CM: 789.00  1/24/2018 Unknown        * (Principal)Large bowel obstruction ICD-10-CM: K56.609  ICD-9-CM: 560.9  1/24/2018 Unknown        Chemotherapy-induced neuropathy (HCC) (Chronic) ICD-10-CM: G62.0, T45.1X5A  ICD-9-CM: 357.6, E933.1  1/24/2018 Unknown        Alcohol abuse (Chronic) ICD-10-CM: F10.10  ICD-9-CM: 305.00  1/24/2018 Unknown        Bladder cancer ([facility] [address] ICD-10-CM: C67.9  ICD-9-CM: 188.9  8/7/2013 Unknown        Nicotine dependence ICD-10-CM: F17.200  ICD-9-CM: 305.1  3/27/2009 Yes              BACKGROUND:    Past Medical History:   Past Medical History:   Diagnosis Date    Acute sinusitis, unspecified     Alcohol abuse, unspecified     Bladder cancer ([facility] [address] 2013    chemo & radiation    Cough     Depressive disorder     Hematuria     HLD (hyperlipidemia)     Impotence of organic origin     Insomnia, unspecified     Other abnormal glucose     Other seborrheic keratosis     Pain in joint, shoulder region          Patient taking anticoagulants yes     ASSESSMENT:    Changes in Assessment Throughout Shift: abd pain     Patient has Central Line: no Reasons if yes:    Patient has Patel Cath: no Reasons if yes:       Last Vitals:     Vitals:    01/24/18 2112 01/24/18 2330 01/24/18 2347 01/25/18 0356   BP: 121/69 145/79  134/75   Pulse: 65 70  68   Resp: 17 18 19   Temp: 97.7 °F (36.5 °C) 97.9 °F (36.6 °C)  96.8 °F (36 °C)   SpO2: 96% 93%  96%   Weight:   64 kg (141 lb)    Height:   5' 9\" (1.753 m)         IV and DRAINS (will only show if present)   Peripheral IV 01/24/18 Right Antecubital-Site Assessment: Clean, dry, & intact  Nasogastric Tube 01/24/18-Site Assessment: Clean, dry, & intact     WOUND (if present)   Wound Type:  none   Dressing present Dressing Present : No   Wound Concerns/Notes:  none     PAIN    Pain Assessment    Pain Intensity 1: 0 (01/25/18 0356)    Pain Location 1: Abdomen         Patient Stated Pain Goal: 2  o Interventions for Pain:  PCA  o Intervention effective: yes  o Time of last intervention: PCA   o Reassessment Completed: yes      Last 3 Weights:  Last 3 Recorded Weights in this Encounter    01/24/18 2347   Weight: 64 kg (141 lb)     Weight change:      INTAKE/OUPUT    Current Shift:      Last three shifts:       LAB RESULTS     Recent Labs      01/25/18   0452  01/24/18   1545   WBC  5.7  7.8   HGB  15.4  16.8*   HCT  43.9  46.8   PLT  243  296        Recent Labs      01/25/18   0452  01/24/18   1545   NA  141  138   K  2.8*  3.6   GLU  97  142*   BUN  18  20*   CREA  0.65  0.99   CA  8.2*  9.6       RECOMMENDATIONS AND DISCHARGE PLANNING     1. Pending tests/procedures/ Plan of Care or Other Needs: Potassium runs     2. Discharge plan for patient and Needs/Barriers: home / Hospice? 3. Estimated Discharge Date: unknown Posted on Whiteboard in Patients Room: no      4. The patient's care plan was reviewed with the oncoming nurse.        \"HEALS\" SAFETY CHECK      Fall Risk    Total Score: 3    Safety Measures: Safety Measures: Bed/Chair-Wheels locked, Bed/Chair alarm on, Bed in low position, Call light within reach, Side rails X 3    A safety check occurred in the patient's room between off going nurse and oncoming nurse listed above. The safety check included the below items  Area Items   H  High Alert Medications - Verify all high alert medication drips (heparin, PCA, etc.)   E  Equipment - Suction is set up for ALL patients (with tray)  - Red plugs utilized for all equipment (IV pumps, etc.)  - WOWs wiped down at end of shift.  - Room stocked with oxygen, suction, and other unit-specific supplies   A  Alarms - Bed alarm is set for fall risk patients  - Ensure chair alarm is in place and activated if patient is up in a chair   L  Lines - Check IV for any infiltration  - Patel bag is empty if patient has a Patel   - Tubing and IV bags are labeled   S  Safety   - Room is clean, patient is clean, and equipment is clean. - Hallways are clear from equipment besides carts. - Fall bracelet on for fall risk patients  - Ensure room is clear and free of clutter  - Suction is set up for ALL patients (with tray)  - Hallways are clear from equipment besides carts.    - Isolation precautions followed, supplies available outside room, sign posted     Shell Correia RN

## 2018-01-25 NOTE — PROGRESS NOTES
*ATTENTION:  This note has been created by a medical student for educational purposes only. Please do not refer to the content of this note for clinical decision-making, billing, or other purposes. Please see attending physicians note to obtain clinical information on this patient. *         Medical student Progress Note  [facility]       Patient: Nasir Townsend MRN: [id number]  CSN: [id number]    YOB: 1955  Age: 57 y.o. Sex: male    DOA: 1/24/2018 LOS:  LOS: 1 day                    Subjective:     Acute events: Patient still having a lot of pain. Patient able to get some sleep overnight. Unable to have conversation due to extreme pain even with PCA. No flatus and no bowel movement. ROS    Objective:      Patient Vitals for the past 24 hrs:   Temp Pulse Resp BP SpO2   01/25/18 0756 96.7 °F (35.9 °C) 69 18 138/75 98 %   01/25/18 0356 96.8 °F (36 °C) 68 19 134/75 96 %   01/24/18 2330 97.9 °F (36.6 °C) 70 18 145/79 93 %   01/24/18 2112 97.7 °F (36.5 °C) 65 17 121/69 96 %   01/24/18 1432 98 °F (36.7 °C) 75 18 109/67 96 %         Intake/Output Summary (Last 24 hours) at 01/25/18 0806  Last data filed at 01/25/18 0023   Gross per 24 hour   Intake                0 ml   Output                0 ml   Net                0 ml     Minimal NG output. Physical Exam:   General:  Alert and Responsive. In severe pain. CV:  RRR, no murmurs. No visible pulsations or thrills. RESP:  Unlabored breathing. Lungs clear to auscultation. no wheeze, rales, or rhonchi. ABD:  Soft, active bowel sound 4x, tender to palpation in 4 quadrants, distended. Drains: NG tube, winslow catheter      Lab/Data Reviewed:  Results for Izabela Neves (MRN [id number]) as of 1/25/2018 08:05   Ref.  Range 1/25/2018 04:52   WBC Latest Ref Range: 4.6 - 13.2 K/uL 5.7   RBC Latest Ref Range: 4.70 - 5.50 M/uL 4.64 (L)   HGB Latest Ref Range: 13.0 - 16.0 g/dL 15.4   HCT Latest Ref Range: 36.0 - 48.0 % 43.9   MCV Latest Ref Range: 74.0 - 97.0 FL 94.6   MCH Latest Ref Range: 24.0 - 34.0 PG 33.2   MCHC Latest Ref Range: 31.0 - 37.0 g/dL 35.1   RDW Latest Ref Range: 11.6 - 14.5 % 12.7   PLATELET Latest Ref Range: 135 - 420 K/uL 243   MPV Latest Ref Range: 9.2 - 11.8 FL 10.2   NEUTROPHILS Latest Ref Range: 40 - 73 % 60   LYMPHOCYTES Latest Ref Range: 21 - 52 % 24   MONOCYTES Latest Ref Range: 3 - 10 % 12 (H)   EOSINOPHILS Latest Ref Range: 0 - 5 % 3   BASOPHILS Latest Ref Range: 0 - 2 % 1   DF Latest Units:   AUTOMATED   ABS. NEUTROPHILS Latest Ref Range: 1.8 - 8.0 K/UL 3.5   ABS. LYMPHOCYTES Latest Ref Range: 0.9 - 3.6 K/UL 1.4   ABS. MONOCYTES Latest Ref Range: 0.05 - 1.2 K/UL 0.7   ABS. EOSINOPHILS Latest Ref Range: 0.0 - 0.4 K/UL 0.2   ABS.  BASOPHILS Latest Ref Range: 0.0 - 0.1 K/UL 0.0   Sodium Latest Ref Range: 136 - 145 mmol/L 141   Potassium Latest Ref Range: 3.5 - 5.5 mmol/L 2.8 (LL)   Chloride Latest Ref Range: 100 - 108 mmol/L 106   CO2 Latest Ref Range: 21 - 32 mmol/L 28   Anion gap Latest Ref Range: 3.0 - 18 mmol/L 7   Glucose Latest Ref Range: 74 - 99 mg/dL 97   BUN Latest Ref Range: 7.0 - 18 MG/DL 18   Creatinine Latest Ref Range: 0.6 - 1.3 MG/DL 0.65   BUN/Creatinine ratio Latest Ref Range: 12 - 20   28 (H)   Calcium Latest Ref Range: 8.5 - 10.1 MG/DL 8.2 (L)   GFR est non-AA Latest Ref Range: >60 ml/min/1.73m2 >60   GFR est AA Latest Ref Range: >60 ml/min/1.73m2 >60       Scheduled Medications Reviewed:  Current Facility-Administered Medications   Medication Dose Route Frequency    HYDROmorphone (PF) (DILAUDID) 30 mg / 30 mL PCA   IntraVENous TITRATE    potassium chloride 10 mEq, lidocaine (PF) (XYLOCAINE) 10 mg/mL (1 %) 1 mL in 0.9% sodium chloride 100 mL IVPB   IntraVENous Q1H    enoxaparin (LOVENOX) injection 40 mg  40 mg SubCUTAneous Q24H    nicotine (NICODERM CQ) 14 mg/24 hr patch 1 Patch  1 Patch TransDERmal DAILY    sodium chloride (NS) flush 5-10 mL  5-10 mL IntraVENous Q8H    0.9% sodium chloride infusion  100 mL/hr IntraVENous CONTINUOUS         Imaging, microbiology, and EKG/Telemetry:  CT abdomen 1/24  IMPRESSION:     1. Developing colonic obstruction with focal transition at the rectosigmoid  junction region and underlying abnormal rectal wall thickening, enhancement and  increased perirectal fat stranding.     - Possibly related to developing rectal carcinoma vs. postradiation change vs.  stricture development from chronic inflammatory process. This is probably the  source for the colonic obstruction.     2. Abnormal appearance of the ureters and bladder. Most likely related to the  patient's known bladder CA.     - Enlarged prostate with heterogeneous enhancement.     3. Increasing adenopathy. The right groin node is distinctly new compared to  the previous study. Increasing number of enhancing nodes in both groin regions.     4.  Developing lytic lesions in the skeletal structures suggestive of skeletal  metastasis.     5.  Multiple lung nodules.             Assessment/Plan   57 y.o. male with PMH of malignant bladder cancer s/p TURBT and chemo and radiaiton therapy, chronic constipation, urinary incontinence, T2DM, neuropathy, nicotine dependence and alcohol abuse, now admitted with large bowel obstruction. 1. Colonic obstruction: This is likely due to rectal thickening as seen by colonoscopy in 1/8 and as well as medications such as fentanyl and hydrocodone that reduced his GI function. He has had abdominal pain and obstructive symptoms for a week. It is possible that bladder cancer has metastasized to rectosigmoid junction and caused obstruction, but it is unlikely from colonoscopy result few weeks back.   Patient also showed low potassium levels, likely due to dehydration.                           -NPO, IV fluid, NG tube to relieve symptoms                        -Consulted surgery for possible resection as this patient has had obstructive symptoms in the past -Consult GI                        -Phnergan 25mg IV q6hrs                        -Monitor CBC, BMP                        -Received 10mEq KCl Q1H     2. Bladder Cancer ([facility] [address]): Patient has history of bladder cancer and is followed by medical oncologist and urological oncologist.  Increasing adenopathy and lytic lesions in skeletal structures noted in CT scan likely due to metastasis       -Consult Urology, Heme/Onc, Palliative Care   -Hold Fentanyl patch and oxycodone   -Increase Dilaudid PCA pump from 0.6 mg to 1 mg every hr and lockout from 15 min to 10 min    3. Peripheral Neuropathy from chemo   -Continue gabapentin PO when tolerated    4.  Chronic constipation   -Start dulcolax suppository PRN          Diet: NPO      [address] [facility]  01/25/18 8:06 AM

## 2018-01-25 NOTE — PROGRESS NOTES
Problem: Mobility Impaired (Adult and Pediatric)  Goal: *Acute Goals and Plan of Care (Insert Text)  Physical Therapy Goals  Initiated 1/25/2018 and to be accomplished within 7 day(s)  1. Patient will move from supine to sit and sit to supine , scoot up and down and roll side to side in bed with supervision/set-up. 2.  Patient will transfer from bed to chair and chair to bed with supervision/set-up using the least restrictive device. 3.  Patient will perform sit to stand with supervision/set-up. 4.  Patient will ambulate with supervision/set-up for >50 feet with the least restrictive device. 5.  Patient will ascend/descend 2 stairs with 1 handrail(s) with supervision/set-up. physical Therapy EVALUATION    Patient: Clearence Soulier (2 y.o. male)  Date: 1/25/2018  Primary Diagnosis: SBO (small bowel obstruction)  Abdominal pain  Bladder cancer (HCC)  Metastatic disease (HCC)  Intractable abdominal pain        Precautions: Fall     ASSESSMENT :  Patient 54yo M admitted to hospital for SBO and presents today alert and agreeable to therapy and was supine in bed upon arrival. Patient transferred to sitting EOB and was requesting water; patient educated on NPO status and PT spoke with RN Amy Arana who cleared patient for small cup of ice chips. Patient then performed seated objective assessment and stood to ambulated in room; limited by NGT on suction to wall. Patient did not use AD and ambulated 15ft before transferring into locked recliner. Patient then transferred onto [facility] and was left with call bell by his side and instructions to call for nursing when he needed to get up from [facility]. Patient demonstrates decreased strength, mobility, and I/ADL's. Patient will benefit from skilled intervention to address the above impairments.   Patients rehabilitation potential is considered to be Good  Factors which may influence rehabilitation potential include:   []         None noted  []         Mental ability/status  [x] Medical condition  [x]         Home/family situation and support systems  [x]         Safety awareness  [x]         Pain tolerance/management  []         Other:      PLAN :  Recommendations and Planned Interventions:  [x]           Bed Mobility Training             [x]    Neuromuscular Re-Education  [x]           Transfer Training                   []    Orthotic/Prosthetic Training  [x]           Gait Training                          []    Modalities  [x]           Therapeutic Exercises          []    Edema Management/Control  [x]           Therapeutic Activities            [x]    Patient and Family Training/Education  []           Other (comment):    Frequency/Duration: Patient will be followed by physical therapy 1-2 times per day/4-7 days per week to address goals. Discharge Recommendations: Home Health  Further Equipment Recommendations for Discharge: N/A     G-CODES     Mobility  Current  CI= 1-19%   Goal  CI= 1-19%. The severity rating is based on the Level of Assistance required for Functional Mobility and ADLs.        G-CODES     Eval Complexity: History: MEDIUM  Complexity : 1-2 comorbidities / personal factors will impact the outcome/ POC Exam:LOW Complexity : 1-2 Standardized tests and measures addressing body structure, function, activity limitation and / or participation in recreation  Presentation: LOW Complexity : Stable, uncomplicated  Clinical Decision Making:Low Complexity   Overall Complexity:LOW     SUBJECTIVE:   Patient stated If I don't get some water I'm going to shoot somebody.     OBJECTIVE DATA SUMMARY:     Past Medical History:   Diagnosis Date    Acute sinusitis, unspecified     Alcohol abuse, unspecified     Bladder cancer ([facility] [address] 2013    chemo & radiation    Cough     Depressive disorder     Hematuria     HLD (hyperlipidemia)     Impotence of organic origin     Insomnia, unspecified     Other abnormal glucose     Other seborrheic keratosis     Pain in joint, shoulder region      Past Surgical History:   Procedure Laterality Date    COLONOSCOPY N/A 1/8/2018    COLONOSCOPY with disimpaction performed by Angelia Awad MD at [address]  12/9/2014    Dr. Tae Mcmillan  OTHER SURGICAL  01/04/2017    Tore (R) arm tendon, had surgery at [address]  01/04/13    Angelia Lamb [facility], TURBT - low-grade stage TA, Dr Herlinda Dawson  05/27/14    [facility], Cysto-TURP, Transurethral resection/multiple bladder biopsies - Papillary Urothelial Hyperplasia, Dr Benjamin Carrel  1/29/15    Dr. Savage Mode - clot evacuation/fulguration     Barriers to Learning/Limitations: None  Compensate with: N/A  Prior Level of Function/Home Situation: Patient lives alone in 1 story home with 2STE with HR and was walking household distances with [facility]. Patient reports he was independent with I/ADL's PTA. Home Situation  Home Environment: Private residence  One/Two Story Residence: One story  Living Alone: Yes  Support Systems: Home care staff  Patient Expects to be Discharged to[de-identified] Private residence  Current DME Used/Available at Home: None  Critical Behavior:      Strength:    Strength: Generally decreased, functional (BLE)   Tone & Sensation:   Tone: Normal (BLE)   Sensation: Intact (BLE)   Range Of Motion:  AROM: Within functional limits (BLE)   Functional Mobility:  Bed Mobility:   Supine to Sit: Supervision   Scooting: Supervision  Transfers:  Sit to Stand: Supervision  Stand to Sit: Supervision   Balance:   Sitting: Intact  Standing: Impaired  Standing - Static: Fair  Standing - Dynamic : Fair  Ambulation/Gait Training:  Distance (ft): 15 Feet (ft)   Ambulation - Level of Assistance: Stand-by asssistance   Base of Support: Widened  Speed/Sudha: Slow  Interventions: Verbal cues; Visual/Demos  Pain:  Pt reports 8/10 pain or discomfort prior to treatment.    Pt reports 8/10 pain or discomfort post treatment. Activity Tolerance:   Patient tolerated activity well and was left resting with call bell by her side on [facility] as he requested to remain on [facility] for a while. Please refer to the flowsheet for vital signs taken during this treatment. After treatment:   [x]         Patient left in no apparent distress sitting up in chair  []         Patient left in no apparent distress in bed  [x]         Call bell left within reach  [x]         Nursing notified Tab Chou)  []         Caregiver present  []         Bed alarm activated  []         SCDs in place to RUBY LARSEN     COMMUNICATION/EDUCATION:   [x]         Fall prevention education was provided and the patient/caregiver indicated understanding. [x]         Patient/family have participated as able in goal setting and plan of care. [x]         Patient/family agree to work toward stated goals and plan of care. [x]         Patient understands intent and goals of therapy, but is neutral about his/her participation. []         Patient is unable to participate in goal setting and plan of care.     Thank you for this referral.  Paul Rubio, PT   Time Calculation: 33 mins

## 2018-01-25 NOTE — PROGRESS NOTES
Medical student Admission Note  [facility]    Patient: Filippo Richmond MRN: [id number]  [facility]: [id number]    YOB: 1955  Age: 57 y.o. Sex: male      DOA: 1/24/2018       HPI:     Filippo Richmond is a 57 y.o. male with PMH of metastatic bladder cancer ([address] s/p multiple TURBT and chemo and radiation, significant constipation, and narrow rectum, now presenting with complaint of severe abdominal pain that patient states is 10/10. Patient has had abdominal pain all this week that started in the lower mid abdomen, and progressed to severe restless pain that now radiates to all the abdomen today. Patient did not eat and drank very little for past 5-6 days due to nausea, and has had non-bloody vomit 2x on Monday and yesterday. He did not have bowel movement for past 4-5 days but was able to pass gas yesterday, which relieved some abdominal pain. He states that he uses stool softner and has bowel movement every other day. He also noticed hematuria that started yesterday. Patient has recently been using fentanyl patch since Monday, and patient has been taking oxycodone for pain. Patient states he had Ensure this morning, and took oxycodone and put on fentanyl patch. Recently, he was seen by GI on 1/8 for colonoscopy, in which it was found there was narrowing in rectum and fecal impaction. He denies headache, dizziness, fever, chills, melena, and hematemesis. ED Course: Patient was given dilaudid for pain in the afternoon which relieved his pain, and received NS bolus infusion. Patient is currently in severe pain, even though he was given another dose of dilaudid.         Review of Systems - General ROS: negative for  - chills, fever  Ophthalmic ROS: negative for - blurry vision, decreased vision or loss of vision  ENT ROS: negative for - headaches, hearing change or visual changes  Cardiovascular ROS: negative for - chest pain, loss of consciousness  Gastrointestinal ROS: Positive for - abdominal pain, nausea, vomiting, diarrhea, constipation, dysphagia. Negative for blood in stools, hematemesis, melena.   Genito-Urinary ROS: Positive for dysuria, hematuria, incontinence   Musculoskeletal ROS: negative for - joint pain, joint swelling or muscle pain  Neurological ROS: Positive for numbness (chemo), negative for - dizziness, headaches        Past Medical History:   Diagnosis Date    Acute sinusitis, unspecified     Alcohol abuse, unspecified     Bladder cancer ([facility] [address] 2013    chemo & radiation    Cough     Depressive disorder     Hematuria     HLD (hyperlipidemia)     Impotence of organic origin     Insomnia, unspecified     Other abnormal glucose     Other seborrheic keratosis     Pain in joint, shoulder region        Past Surgical History:   Procedure Laterality Date    COLONOSCOPY N/A 1/8/2018    COLONOSCOPY with disimpaction performed by Bee Ford MD at [address]  12/9/2014    Dr. Tano Hartman    HX COLONOSCOPY      HX OTHER SURGICAL  01/04/2017    Tore (R) arm tendon, had surgery at [address]  01/04/13    [facility] - low-grade stage TA, Dr Manolo Calloway  05/27/14    [facility], Cysto-TURP, Transurethral resection/multiple bladder biopsies - Papillary Urothelial Hyperplasia, Dr Rudy Shetty  1/29/15    Dr. Tano Hartman - clot evacuation/fulguration       Family History   Problem Relation Age of Onset    Hypertension Mother     Kidney Disease Father     Heart Disease Father     Diabetes Sister     Hypertension Brother     Stroke Brother        Social History     Social History    Marital status:      Spouse name: N/A    Number of children: N/A    Years of education: N/A     Social History Main Topics    Smoking status: Current Some Day Smoker     Packs/day: 1.00     Years: 43.00     Types: Cigarettes    Smokeless tobacco: Never Used      Comment: using electronic cigarettes & nicotine patch    Alcohol use 0.0 oz/week      Comment: occassional    Drug use: No    Sexual activity: Not Asked     Other Topics Concern    None     Social History Narrative       Allergies   Allergen Reactions    Codeine Nausea and Vomiting     \"ONLY IN VERY HIGH DOSES\"        Macrodantin [Nitrofurantoin Macrocrystal] Diarrhea, Nausea and Vomiting and Other (comments)     Made pt feel very confused    Other Plant, Animal, Environmental Other (comments)    Oxycodone Nausea and Vomiting     Other reaction(s): gi distress  \"ONLY IN VERY HIGH DOSES\"  \"ONLY IN VERY HIGH DOSES\"      Percocet [Oxycodone-Acetaminophen] Nausea and Vomiting     \"ONLY IN VERY HIGH DOSES\"        Pollen Extracts Runny Nose    Pravachol [Pravastatin] Nausea Only     Other reaction(s): gi distress    Vicodin [Hydrocodone-Acetaminophen] Nausea and Vomiting     Other reaction(s): gi distress  \"ONLY IN VERY HIGH DOSES\"  \"ONLY IN VERY HIGH DOSES\"      Zoloft [Sertraline] Nausea and Vomiting     Other reaction(s): gi distress       Prior to Admission Medications   Prescriptions Last Dose Informant Patient Reported? Taking? ALPRAZolam (XANAX) 1 mg tablet   Yes No   Sig: take 1 tablet by mouth twice a day if needed for anxiety   chlorhexidine (PERIDEX) 0.12 % solution   Yes No   gabapentin (NEURONTIN) 300 mg capsule   Yes No   lidocaine (LIDODERM) 5 %   Yes No   meperidine (DEMEROL) 50 mg tablet   Yes No   Sig: take 1 tablet by mouth every 4 to 6 hours if needed for pain   methocarbamol (ROBAXIN) 500 mg tablet   Yes No   Sig: Take 500 mg by mouth four (4) times daily as needed. Indications: MUSCLE SPASM   mirabegron ER (MYRBETRIQ) 25 mg ER tablet   No No   Sig: Take 1 Tab by mouth daily. oxyCODONE IR (ROXICODONE) 10 mg tab immediate release tablet   Yes No   Sig: take 1 tablet by mouth four times a day if needed   oxybutynin chloride XL (DITROPAN XL) 10 mg CR tablet   No No   Sig: Take 1 Tab by mouth daily. traMADol (ULTRAM) 50 mg tablet   No No   Sig: Take 1 Tab by mouth every six (6) hours as needed for Pain. traZODone (DESYREL) 50 mg tablet   Yes No   Sig: take 1 tablet by mouth daily      Facility-Administered Medications: None       Physical Exam:     Patient Vitals for the past 24 hrs:   Temp Pulse Resp BP SpO2   01/24/18 2112 97.7 °F (36.5 °C) 65 17 121/69 96 %   01/24/18 1432 98 °F (36.7 °C) 75 18 109/67 96 %       Physical Exam:   General:  Alert and Responsive and in severe pain  HEENT: Conjunctiva pink, sclera anicteric. EOMI. CV:  RRR, no murmurs. No visible pulsations or thrills. 2+ bilateral radial pulses  RESP:  Unlabored breathing. Lungs clear to auscultation. no wheeze, rales, or rhonchi. Equal expansion bilaterally. ABD:  Soft, distended, active bowel sounds 4x, tender to palpation in all quadrants with guarding. Suprapubic tenderness. MS:  No joint deformity or instability. No atrophy. Neuro: A+Ox3. Ext:  No edema. Skin:  No rashes, lesions, or ulcers. Dry skin    IMAGING:   FINDINGS:     ABDOMEN     Lung Bases:       - Bandlike density in the left lower lobe. Dependent changes in both lower  lobes. - Scattered nodular densities at both lung bases, i.e 1.5 x 0.6 cm (axial #11)  and 0.4 x 0.3 cm (axial #11) in the right lung base, 0.4 x 0.4 cm right upper  lobe (axial #1), 0.3 x 0.2 cm (axial #7) and 0.2 x 0.1 cm (axial #7) in the  right middle lobe, 0.5 x 0.4 cm (axial #13) and 0.3 x 0.2 cm (axial #13) in the  lingular region, 0.4 x 0.3 cm (axial #9) and 1.0 x 0.8 cm (axial #11) in the  left lower lobe. Additional even tinier nodular densities are also identified  at both lung bases.     Liver:  Diffuse heterogeneous appearance of the liver. Questionable abnormal  enhancing focus in the right lobe measuring 1.7 x 1.5 cm (axial #18). Mild  perihepatic ascites. Diffuse hepatic steatosis.     Spleen:  Minimal perisplenic fluid.   The spleen itself appears unremarkable.     Pancreas, Gallbladder, Adrenal Glands:  Unremarkable.     Spleen:  Mild hydronephrosis bilaterally. Both ureters are mildly dilated  throughout their courses.     Gastrointestinal Tract:  Unremarkable stomach. Dilated small bowel loops. No  evidence for acute small bowel obstruction. Distention of the ascending colon,  transverse colon and descending colon. Sigmoid is also distended. At the  rectosigmoid junction region, a focal area of transition in the caliber of the  colon is demonstrated (axial #74-76), with abnormal colon wall thickening. This  area appears to show increased degree of colon wall thickening compared to  10/6/17. Multiple subtle perirectal lymph nodes. Fairly extensive perirectal  fat stranding.     Retroperitoneum:  Enlarged retroperitoneal nodes. The most pronounced left  para-aortic node measures about 2.1 x 1.8 cm (axial #41). This node was about  1.6 x 1.5 cm on 10/6/17. Multiple nodes along the iliac chain. Overall similar  in conspicuity compared to 10/6/17.     Vascular:  Non-aneurysmal aorta. Moderate atheromatous plaque calcifications.     PELVIS     Urinary Bladder:  Diffuse bladder wall thickening. Presumably related to the  patient's diagnosis of bladder CA. Similar appearance compared to 10/6/17.     Prostate:  Heterogeneous enhancement of the prostate. Mild enlargement of the  prostate.     Rectum:  Abnormal mucosal enhancement and peripheral enhancement around the  thickened rectum. The extent of rectal wall thickening is increased compared to  10/6/17. Increased perirectal fat stranding.     Nodes:  Enlarged right groin node, new compared to 10/6/17. The enlarged right  groin node measures about 2.2 x 1.9 cm is. Slightly prominent multiple left  groin nodes, with the largest measuring about 1.2 x 0.9 cm. Increased in  conspicuity compared to 10/6/17.     Bones:  Developing lytic lesion at L5 inapparent on 10/6/17.   The lytic lesion  measures about 4.1 x 2.0 cm (axial #60) additional scattered lytic foci, i.e.  right iliac bone measuring 0.7 x 0.7 cm (axial #60)    Recent Results (from the past 12 hour(s))   CBC WITH AUTOMATED DIFF    Collection Time: 01/24/18  3:45 PM   Result Value Ref Range    WBC 7.8 4.6 - 13.2 K/uL    RBC 5.02 4.70 - 5.50 M/uL    HGB 16.8 (H) 13.0 - 16.0 g/dL    HCT 46.8 36.0 - 48.0 %    MCV 93.2 74.0 - 97.0 FL    MCH 33.5 24.0 - 34.0 PG    MCHC 35.9 31.0 - 37.0 g/dL    RDW 12.8 11.6 - 14.5 %    PLATELET 739 647 - 743 K/uL    MPV 9.9 9.2 - 11.8 FL    NEUTROPHILS 72 40 - 73 %    LYMPHOCYTES 16 (L) 21 - 52 %    MONOCYTES 11 (H) 3 - 10 %    EOSINOPHILS 1 0 - 5 %    BASOPHILS 0 0 - 2 %    ABS. NEUTROPHILS 5.6 1.8 - 8.0 K/UL    ABS. LYMPHOCYTES 1.2 0.9 - 3.6 K/UL    ABS. MONOCYTES 0.8 0.05 - 1.2 K/UL    ABS. EOSINOPHILS 0.1 0.0 - 0.4 K/UL    ABS. BASOPHILS 0.0 0.0 - 0.1 K/UL    DF AUTOMATED     METABOLIC PANEL, COMPREHENSIVE    Collection Time: 01/24/18  3:45 PM   Result Value Ref Range    Sodium 138 136 - 145 mmol/L    Potassium 3.6 3.5 - 5.5 mmol/L    Chloride 99 (L) 100 - 108 mmol/L    CO2 34 (H) 21 - 32 mmol/L    Anion gap 5 3.0 - 18 mmol/L    Glucose 142 (H) 74 - 99 mg/dL    BUN 20 (H) 7.0 - 18 MG/DL    Creatinine 0.99 0.6 - 1.3 MG/DL    BUN/Creatinine ratio 20 12 - 20      GFR est AA >60 >60 ml/min/1.73m2    GFR est non-AA >60 >60 ml/min/1.73m2    Calcium 9.6 8.5 - 10.1 MG/DL    Bilirubin, total 1.0 0.2 - 1.0 MG/DL    ALT (SGPT) 20 16 - 61 U/L    AST (SGOT) 28 15 - 37 U/L    Alk.  phosphatase 75 45 - 117 U/L    Protein, total 7.7 6.4 - 8.2 g/dL    Albumin 3.3 (L) 3.4 - 5.0 g/dL    Globulin 4.4 (H) 2.0 - 4.0 g/dL    A-G Ratio 0.8 0.8 - 1.7     LIPASE    Collection Time: 01/24/18  3:45 PM   Result Value Ref Range    Lipase 67 (L) 73 - 393 U/L   POC LACTIC ACID    Collection Time: 01/24/18  3:52 PM   Result Value Ref Range    Lactic Acid (POC) 2.3 (HH) 0.4 - 2.0 mmol/L   POC LACTIC ACID    Collection Time: 01/24/18 7:36 PM   Result Value Ref Range    Lactic Acid (POC) 0.7 0.4 - 2.0 mmol/L         Assessment/Plan:   57 y.o. male with PMH metastatic bladder cancer ([address] s/p TURBT and chemo and radiation, significant constipation, and rectum thickening, now admitted with colonic obstruction. 1. Colonic obstruction: This is likely due to rectal thickening as seen by colonoscopy in 1/8 and as well as medications such as fentanyl and hydrocodone that reduced his GI function. He has had abdominal pain and obstructive symptoms for a week. It is possible that bladder cancer has metastasized to rectosigmoid junction and caused obstruction, but it is unlikely from colonoscopy result few weeks back. -NPO, IV fluid, NG tube to relieve symptoms   -Hold fentanyl patch for now   -Consult surgery for possible resection as this patient has had obstructive symptoms in the past      2. Bladder Cancer [facility]): Patient has history of bladder cancer and is followed by medical oncologist and urological oncologist.  Increasing adenopathy and lytic lesions in skeletal structures noted in CT scan likely due to metastasis      -Follow up with Dr. Tracie Berry (Urological oncologist) and medical oncologist (Dr. Lissett Hess).          Vadim SOARES, MS3

## 2018-01-25 NOTE — H&P
Admission History and Physical  [facility]      Patient: Ivy Gonzalez MRN: [id number]  [facility]: [id number]    YOB: 1955  Age: 57 y.o. Sex: male      DOA: 1/24/2018       HPI:     Ivy Gonzalez is a 57 y.o. male with PMH of malignant bladder cancer, urinary incontinence, chemotherapy-induced neuropathy, chronic constipation, nicotine dependence and alcohol abuse, now presenting with complaint of constant, worsening, abdominal pain. HPI were provided by both patient and patient's brother-in-law who was present at bedside. Patient's brother-in-law reported that since September 2017 patient's health has declined. Since September 2017 patient's appetite changed and patient developed diffuse, intermittent abdominal pain w/nausea. Over the past 3-4 days, patient endorsed nausea & vomiting. Patient also endorsed 10/10 bilateral lower quadrant abdominal pain that radiates from one flank to the other flank. Patient denied any CP, SOB, fever/chills, diarrhea or urinary sx. Patient's last bowel movement was 5 days ago. Patient also report that he has not flatus or burping for the past couple of days. Patient has been unable to tolerate solid food for the past couple of days d/t nausea & vomiting. Patient reports no relief in abdominal pain with fentanyl patch and only temporary relief in abdominal pain with PO oxycodone 10 mg q6h prn. Patient has a history of progressive malignant bladder cancer and completed chemo + radiation in 2014. Recent imaging show metastases to spinal cord and lungs. While in the ED, patient was noted to be in considerable amounts of pain and was not able to sit in one position too long d/t lower quadrant abdominal pain. Patient's brother-in-law reported that patient's primary care physician, Dr. Isrrael Jenkins, had started the process for patient being placed on Hospice.  Hospice representative was present in ED to evaluate patient and will likely not initiate any intervention yet d/t patient's pain being not well controlled. Patient reports that he is still actively smoking and drinking alcohol.     ED Course: CBC, CMP, Lipase, Lactic Acid x2, KUB, CT Abd/Pelv, IV Dilaudid 1 mg x4, IV Phenergan 25 mg x1, NS 1L bolus x1    Review of Systems -   General ROS: negative for chills, fever, night sweats, weight gain and weight loss  Psychological ROS: negative for anxiety and depression  Ophthalmic ROS: negative for blurry vision, decreased vision or loss of vision  ENT ROS: negative for headaches, hearing change or visual changes  Hematological and Lymphatic ROS: negative for bruising, jaundice  Respiratory ROS: negative for cough, hemoptysis, shortness of breath, orthopnea, paroxysmal dyspnea, or wheezing  Cardiovascular ROS: negative for chest pain, dyspnea on exertion, edema, loss of consciousness, or palpitations   Gastrointestinal ROS: negative for abdominal pain, blood in stools, change in stools, constipation, diarrhea, hematemesis, melena, nausea/vomiting or swallowing difficulty/pain  Genito-Urinary ROS: negative for dysuria, hematuria or urinary frequency/urgency  Musculoskeletal ROS: negative for joint pain, joint swelling or muscle pain  Neurological ROS: negative for dizziness, headaches, numbness/tingling or weakness  Dermatological ROS: negative for rash or skin lesion changes      Past Medical History:   Diagnosis Date    Acute sinusitis, unspecified     Alcohol abuse, unspecified     Bladder cancer ([facility] [address] 2013    chemo & radiation    Cough     Depressive disorder     Hematuria     HLD (hyperlipidemia)     Impotence of organic origin     Insomnia, unspecified     Other abnormal glucose     Other seborrheic keratosis     Pain in joint, shoulder region        Past Surgical History:   Procedure Laterality Date    COLONOSCOPY N/A 1/8/2018    COLONOSCOPY with disimpaction performed by Kirsty Jarvis MD at [facility] CYSTOURETHROSCOPY,BIOPSIES  12/9/2014    Dr. Ima Meckel    HX COLONOSCOPY      HX OTHER SURGICAL  01/04/2017    Torcasey (R) arm tendon, had surgery at [address]  01/04/13    [facility], TURBT - low-grade stage TA, Dr Eliot Ortiz  05/27/14    [facility], Cysto-TURP, Transurethral resection/multiple bladder biopsies - Papillary Urothelial Hyperplasia, Dr Justin Mata  1/29/15    Dr. Ima Meckel - clot evacuation/fulguration       Family History   Problem Relation Age of Onset    Hypertension Mother     Kidney Disease Father     Heart Disease Father     Diabetes Sister     Hypertension Brother     Stroke Brother        Social History     Social History    Marital status:      Spouse name: N/A    Number of children: N/A    Years of education: N/A     Social History Main Topics    Smoking status: Current Some Day Smoker     Packs/day: 1.00     Years: 43.00     Types: Cigarettes    Smokeless tobacco: Never Used      Comment: using electronic cigarettes & nicotine patch    Alcohol use 0.0 oz/week      Comment: occassional    Drug use: No    Sexual activity: Not Asked     Other Topics Concern    None     Social History Narrative       Allergies   Allergen Reactions    Codeine Nausea and Vomiting     \"ONLY IN VERY HIGH DOSES\"        Macrodantin [Nitrofurantoin Macrocrystal] Diarrhea, Nausea and Vomiting and Other (comments)     Made pt feel very confused    Other Plant, Animal, Environmental Other (comments)    Oxycodone Nausea and Vomiting     Other reaction(s): gi distress  \"ONLY IN VERY HIGH DOSES\"  \"ONLY IN VERY HIGH DOSES\"      Percocet [Oxycodone-Acetaminophen] Nausea and Vomiting     \"ONLY IN VERY HIGH DOSES\"        Pollen Extracts Runny Nose    Pravachol [Pravastatin] Nausea Only     Other reaction(s): gi distress    Vicodin [Hydrocodone-Acetaminophen] Nausea and Vomiting     Other reaction(s): gi distress  \"ONLY IN VERY HIGH DOSES\"  \"ONLY IN VERY HIGH DOSES\"      Zoloft [Sertraline] Nausea and Vomiting     Other reaction(s): gi distress     Prior to Admission Medications   Prescriptions Last Dose Informant Patient Reported? Taking?   docusate sodium (COL-RITE) 100 mg capsule   Yes Yes   Sig: Take 100 mg by mouth daily. fentaNYL (DURAGESIC) 25 mcg/hr PATCH   Yes Yes   Si Patch by TransDERmal route every seventy-two (72) hours. gabapentin (NEURONTIN) 300 mg capsule Unknown at Unknown time  Yes No   Sig: Take 600 mg by mouth three (3) times daily. Indications: NEUROPATHIC PAIN   oxyCODONE IR (ROXICODONE) 10 mg tab immediate release tablet Unknown at Unknown time  Yes No   Sig: take 1 tablet by mouth four times a day if needed   oxybutynin chloride XL (DITROPAN XL) 10 mg CR tablet Unknown at Unknown time  No No   Sig: Take 1 Tab by mouth daily. polyethylene glycol (MIRALAX) 17 gram packet   Yes Yes   Sig: Take 17 g by mouth daily. Facility-Administered Medications: None       Physical Exam:     Patient Vitals for the past 24 hrs:   Temp Pulse Resp BP SpO2   18 2330 97.9 °F (36.6 °C) 70 18 145/79 93 %   18 2112 97.7 °F (36.5 °C) 65 17 121/69 96 %   18 1432 98 °F (36.7 °C) 75 18 109/67 96 %       Physical Exam:  General:  Alert and Responsive and in No acute distress. HEENT: Conjunctiva pink, sclera anicteric. EOMI. Pharynx moist, nonerythematous. Moist mucous membranes. Thyroid not enlarged, no nodules. No cervical, supraclavicular, occipital or submandibular lymphadenopathy. No other gross abnormalities present. CV:  RRR, no murmurs/rubs/gallops. No visible pulsations or thrills. RESP:  Unlabored breathing. Lungs clear to auscultation. No wheeze, rales, or rhonchi. Equal expansion bilaterally. ABD:  Soft, tender to palpation in bilateral lower quadrant, distended. No rebound, no guarding. No hepatosplenomegaly. No suprapubic tenderness. MS:  No joint deformity or instability. No atrophy. Neuro:   A+Ox3. 5/5 strength bilateral upper extremities and lower extremities. Ext:  No edema. 2+ radial and dp pulses bilaterally. Skin:  No rashes, lesions, or ulcers. Good turgor. IMAGING:     Xr Abd (kub)    Result Date: 1/24/2018  Impression: Ileus vs. evolving obstruction involving the distal colon-rectum. Ct Abd Pelv W Cont    Result Date: 1/24/2018  IMPRESSION: 1. Developing colonic obstruction with focal transition at the rectosigmoid junction region and underlying abnormal rectal wall thickening, enhancement and increased perirectal fat stranding. - Possibly related to developing rectal carcinoma vs. postradiation change vs. stricture development from chronic inflammatory process. This is probably the source for the colonic obstruction. 2.  Abnormal appearance of the ureters and bladder. Most likely related to the patient's known bladder CA. - Enlarged prostate with heterogeneous enhancement. 3.  Increasing adenopathy. The right groin node is distinctly new compared to the previous study. Increasing number of enhancing nodes in both groin regions. 4.  Developing lytic lesions in the skeletal structures suggestive of skeletal metastasis. 5.  Multiple lung nodules. Xr Chest Port    Result Date: 1/24/2018  IMPRESSION: Esophagogastric tube terminates in the proximal stomach. Subsegmental atelectasis at the left base. Wendy Arizmendi Recent Results (from the past 12 hour(s))   CBC WITH AUTOMATED DIFF    Collection Time: 01/24/18  3:45 PM   Result Value Ref Range    WBC 7.8 4.6 - 13.2 K/uL    RBC 5.02 4.70 - 5.50 M/uL    HGB 16.8 (H) 13.0 - 16.0 g/dL    HCT 46.8 36.0 - 48.0 %    MCV 93.2 74.0 - 97.0 FL    MCH 33.5 24.0 - 34.0 PG    MCHC 35.9 31.0 - 37.0 g/dL    RDW 12.8 11.6 - 14.5 %    PLATELET 146 109 - 062 K/uL    MPV 9.9 9.2 - 11.8 FL    NEUTROPHILS 72 40 - 73 %    LYMPHOCYTES 16 (L) 21 - 52 %    MONOCYTES 11 (H) 3 - 10 %    EOSINOPHILS 1 0 - 5 %    BASOPHILS 0 0 - 2 %    ABS. NEUTROPHILS 5.6 1.8 - 8.0 K/UL    ABS.  LYMPHOCYTES 1.2 0.9 - 3.6 K/UL    ABS. MONOCYTES 0.8 0.05 - 1.2 K/UL    ABS. EOSINOPHILS 0.1 0.0 - 0.4 K/UL    ABS. BASOPHILS 0.0 0.0 - 0.1 K/UL    DF AUTOMATED     METABOLIC PANEL, COMPREHENSIVE    Collection Time: 01/24/18  3:45 PM   Result Value Ref Range    Sodium 138 136 - 145 mmol/L    Potassium 3.6 3.5 - 5.5 mmol/L    Chloride 99 (L) 100 - 108 mmol/L    CO2 34 (H) 21 - 32 mmol/L    Anion gap 5 3.0 - 18 mmol/L    Glucose 142 (H) 74 - 99 mg/dL    BUN 20 (H) 7.0 - 18 MG/DL    Creatinine 0.99 0.6 - 1.3 MG/DL    BUN/Creatinine ratio 20 12 - 20      GFR est AA >60 >60 ml/min/1.73m2    GFR est non-AA >60 >60 ml/min/1.73m2    Calcium 9.6 8.5 - 10.1 MG/DL    Bilirubin, total 1.0 0.2 - 1.0 MG/DL    ALT (SGPT) 20 16 - 61 U/L    AST (SGOT) 28 15 - 37 U/L    Alk. phosphatase 75 45 - 117 U/L    Protein, total 7.7 6.4 - 8.2 g/dL    Albumin 3.3 (L) 3.4 - 5.0 g/dL    Globulin 4.4 (H) 2.0 - 4.0 g/dL    A-G Ratio 0.8 0.8 - 1.7     LIPASE    Collection Time: 01/24/18  3:45 PM   Result Value Ref Range    Lipase 67 (L) 73 - 393 U/L   POC LACTIC ACID    Collection Time: 01/24/18  3:52 PM   Result Value Ref Range    Lactic Acid (POC) 2.3 (HH) 0.4 - 2.0 mmol/L   POC LACTIC ACID    Collection Time: 01/24/18  7:36 PM   Result Value Ref Range    Lactic Acid (POC) 0.7 0.4 - 2.0 mmol/L       Assessment/Plan:   57 y.o. male with PMH of malignant bladder cancer, urinary incontinence, T2DM, neuropathy, chronic constipation, nicotine dependence and alcohol abuse, now admitted with large bowel obstruction. Large Bowel Obstruction 2/2 metastasis vs post-radiatio changes vs stricture: Followed by Dr. Sheila Baldwin (GI) as an outpatient. Patient has a h/o metatastic bladder cancer. Admission labs: CBC wnl; CMP wnl; Lipase 67 wnl; Lactic Acid 2.3>0.7. Admission KUB showed ileus vs evolving obstruction involving the distal colon-rectum.  Admission CT AP showed developing colonic obstruction with focal transition at the rectosigmoid junction region and underlying abnormal rectal wall thickening, enhancement and increased perirectal fat stranding.  - General surgery consulted, appreciate recs  - Consult GI in AM  - IVF: NS @ 100 cc/hr, reassess daily  - NPO for bowel rest  - NG tube compression  - Pain control: Dilaudid PCA pump, titrate appropriately  - IV phenergan 25 mg q6h PRN for nausea/vomiting  - Monitor VS, per unit routine    Malignant Bladder Cancer/Urinary Incontinence: Patient is followed by Dr. Dani Jefferson (Urology), Dr. Gen Lehman (Heme-Onc), & Dr. Maty Ramires (Radiation Onc) as outpatient. Patient was diagnosed w/clinical Stage [id number] Large Urothelial Carcinoma of the Bladder in 2014 s/p chemoradiation w/Dr. Gen Lehman and Dr. Maty Ramires. Patient has a history of multiple TURBT's that show progressive metastatic bladder cancer. CT AP (11/17) showed progressive metastatic bladder cancer, enlarged lung nodules, progressed widespread lymphadenopathy, stable 3 cm tumor mass right posterior, moderate bilateral hydronephrosis, and new focus of L5 bone lucency. CT Chest (11/17) showed multiple pulmonary nodules, largest right lower lobe measuring 15 x 7 mm, bulky mediastinal lymph nodes and retroperitoneal lymphadenopathy.   - Consult Heme-Onc (Dr. Gen Lehman) in AM  - [facility]  - Consult Urology in AM  - Hold home oxybutynin XL 10 mg daily while NPO    Chemotherapy-induced Neuropathy:  - Hold home gabapentin 600 mg TID while NPO    Chronic Constipation: Colonoscopy (1/08/18) showed congestion and scarring in the rectum and large load of fecal impaction noted proximal to area of stricture. - Hold home col-rite 100 mg daily + miralax 17 g daily while NPO  - Start dulcolax suppository 10 mg daily PRN    Nicotine Dependence/Alcohol Abuse: Patient has a 43 pack year history and currently smokes about 1 ppd.  Patient has a history of alcohol abuse with current questionable consumption.  - Nicotine 14 mg/24 hr patch  - [facility] Protocol  - Encourage smoking cessation + alcohol abstinence    Diet: NPO  DVT Prophylaxis: Lovenox  Code Status: DNR  Point of Contact: Zeinab Murray, [phone], Sister         Caitlin Mensah, [phone], Brother-in-Law    Disposition and anticipated LOS: Lyla Medina MD, PGY-1  [facility] Medicine  1/24/2018                   Senior Resident History and Physical  [facility]    HPI:     Paola Erickson is a 57 y.o. male with a PMH of bladder CA with multiple pulmonary and skeletal metastases, bowel obstruction, HLD, alcohol and tobacco abuse, who presented to the ED with complaints of intractable abdominal pain, nausea, vomiting, and constipation that began a couple of days ago. Pt is A&OX3 in ED, in considerable pain. Pt's brother in law is present who provides most of the HPI. Pt has hx of bladder CA with multiple pulmonary and skeletal metastases, 1st diagnosed in 2014. Underwent chemotherapy and radiation at that time, but per brother-in-law has not had either since 2014. Mr. Tonio Polanco was reportedly doing ok until this last September when he began having chronic abdominal pain and nausea, which until fairly recently has been well-controlled. Mr. Tonio Polanco began using a Fentanyl patch for increasing abd pain 3 days ago, and has since developed increasing abdominal pain, constipation, and nausea. Mr. Tonio Polanco is in obvious considerable pain. States his abdominal pain has been progressive for the last 3 days, described as diffuse and in a band across his lower abdomen, with distension and diffuse tenderness. He has not had a bowel movement in 5 days, and has had nausea with vomiting for the last 3. Denies fevers, chills, headache, visual problems, CP, SOB, palpitations, diarrhea, but does admit to significant peripheral neuropathy that started after his chemo.      Also of note, pt states that he has an upcoming appointment with Dr. Mariaelena Eller, and may begin immunotherapy. Pt's PCP, Dr. Talita Gallegos, has recently set-up hospice for pt. Hospice representative came to see pt in ED for initial consultation, but nothing formal has been set-up yet. Pt has hx of alcohol abuse and smoking, currently still drinking, last drink a couple of days ago. Brother-in-law states pt drinks a significant amount of liquor, not daily, and also is a current smoker. In the ED, labs included a CBC with a Hgb of 16.8, a reassuring CMP, a X-ray abd that showed ileus vs evolving obstruction involving the distal colon-rectum, and a CT Abd/Pelv showed a developing colonic obstruction with rectal wall thickening perirectal fat stranding. Surgery has been consulted. He was treated with NS 1000 mL IV,  Dilaudid 1 mg IV X3, Ativan 1 mg IV X2, and Phenergan 25 mg IV X2 with some relief. Mr. Duane Norfolk is now admitted with bowel obstruction, likely due to metastases from bladder CA, and for intractable pain, nausea and vomiting. HPI, ROS, PMH, PSH, Family Hx, Social Hx, home medications, and allergies as above in intern H&P. I agree with history as above. Physical Exam:     Visit Vitals    /69 (BP 1 Location: Left arm)    Pulse 65    Temp 97.7 °F (36.5 °C)    Resp 17    SpO2 96%       General: Thin, pale, cachetic except for abdomen, in moderate distress, considerable pain   HEENT:  NCAT. Conjunctiva pink, sclera anicteric. EOMI. Moist mucous membranes. No cervical, supraclavicular, or submandibular lymphadenopathy. CV:  RRR, no mumurs. RESP:  Unlabored breathing. R sided scattered expiratory wheezing and scant rhonchi bilaterally. ABD:  Soft, diffusely distended, diffusely tender mostly across lower abdomen and suprapubic region, along with R and L flanks. Normoactive bowel sounds. MS:  No joint deformity or instability. Neuro:  5/5 strength bilateral upper extremities and lower extremities. Diminished sensation in bilateral LE's. A+Ox3. Ext:  No edema.  2+ radial and dp pulses bilaterally. Skin:  No rashes, lesions, or ulcers. Poor turgor. Imaging    X-Ray Abd 1/24/18   Impression:  Ileus vs. evolving obstruction involving the distal colon-rectum. CT Abd/Pelv W Cont 1/24/18   IMPRESSION:     1. Developing colonic obstruction with focal transition at the rectosigmoid  junction region and underlying abnormal rectal wall thickening, enhancement and  increased perirectal fat stranding.     - Possibly related to developing rectal carcinoma vs. postradiation change vs.  stricture development from chronic inflammatory process. This is probably the  source for the colonic obstruction.     2. Abnormal appearance of the ureters and bladder. Most likely related to the  patient's known bladder CA.     - Enlarged prostate with heterogeneous enhancement.     3. Increasing adenopathy. The right groin node is distinctly new compared to  the previous study. Increasing number of enhancing nodes in both groin regions.     4.  Developing lytic lesions in the skeletal structures suggestive of skeletal  metastasis.     5.  Multiple lung nodules. CT Abd/Pelv W Cont 11/27/17  IMPRESSION:  1. Progressive metastatic bladder cancer  -Mildly interval enlarged lung nodules.  -Interval progressed widespread lymphadenopathy.  -3 cm tumor mass right posterior bladder/adjacent, stable  -(Liver lesions which were previously demonstrated are not well seen, probably \"technical\")  2. Moderate bilateral hydronephrosis, hydroureter, interval worse  -Ureters dilated down to the bladder. Obstructing lesion not identified  3. New, compared one month previous, focus of L5 superior endplate and subchondral medullary bone lucency, which is nonspecific.   -Favored explanation is a Schmorl's node/benign microfracture  -Less likely metastasis or infection.  -If high suspicion for other than Schmorl's node, lumbar spine MR without and with contrast could be obtained  4.  New low attenuation within right posterior bladder/perivesicular mass. This is most likely tumor necrosis. -unlikely phlegmon/developing abscess unless specifically  5. Enhancement/thickening rectal wall, probably worse, is most likely treatment effect     Incidental findings  -Fatty liver. -Bilateral adrenal nodules are likely adenomas  -Colonic diverticulosis      Assessment/Plan:     Kasia Pereyra is a 56 y/o male with PMHx of bladder CA with metastases to the lungs and spine, bowel obstruction, and HLD who is now admitted with bowel obstruction, and for intractable pain, nausea and vomiting.       Bowel Obstruction- Pt has known bladder CA with metastases; see below for further; CT Abd/Pelv in ED shows developing colonic obstruction with rectal wall thickening, increased perirectal fat stranding; possibly related to developing rectal CA vs postradiation change vs stricture development; Pt had colonoscopy done 1/8/18, Dr. Alfred Hart reported narrowing of colon approx 10 cm from rectum, suspected radiation from bladder CA biopsy not performed due high risk of fistula or perforation; stated pt may be candidate for partial colectomy   - Surgery consulted in ED   - Consult GI   - NPO, NG tube placed in ED   - IV NS at 100 mL/hr   - Phenergan 25 mg IV q6hrs PRN   - Daily CBC, BMP     Bladder CA with Pulmonary and Skeletal Metastases- Followed by Dr. Aleena Mathias, [facility], and Dr. Miri Aguero, Heme/Onc; 1st dx'ed with bladder CA in 2014; s/p chemotherapy and radiation with multiple TURBT; last seen by Dr. Aleena Mathias 12/6/17 with current disease status as Recurrent LG Ta UCB with bilateral pulmonary nodules, widespread lymphadenopathy; CT Abd/Pelv 11/17 showed progressive metastatic bladder CA, enlarged lung nodules, moderate bilateral hydronephrosis with ureters dilated to the bladder, L5 bone lucency and likely tumor necrosis within the bladder   - Consult Urology  - Consult Heme/Onc, Dr. Miri Aguero  - Consult Palliative   - Phenergan 25 mg IV q6hrs PRN    - Hold home Fentanyl patch and Oxycodone   - Dilaudid PCA pump for pain control   - Continue home Oxybutynin 5 mg BID when able to tolerate PO    Peripheral Neuropathy- chemotherapy induced    - Continue home Gabapentin 600 mg TID when able to tolerate PO     Chronic Constipation   - Hold home col-rite 100 mg daily + miralax 17 g daily while NPO  - Start dulcolax suppository 10 mg daily PRN    Alcohol Abuse- Pt currently drinking a significant amount, last drink a couple of days ago  - [facility] protocol     Smoking- Currently smoking 1 pack/3-4 days  - Nicotine patch, 21 mg, daily      General Care  - Fall precautions  - PT/OT  - Nutrition consult   - Palliative consult       DO NIRANJAN MccannMS-[facility]   1/24/2018

## 2018-01-25 NOTE — PROGRESS NOTES
OT orders received and chart reviewed. Pt not seen for skilled OT due to: pt declined eval at this time  t  Will f/u later as patients' schedule allows. Thank you.   Joseph Luevano OTR/L

## 2018-01-25 NOTE — PROGRESS NOTES
PM check on patient: Pt in bathroom, had a BM during gastrograffin enema. Pain better controlled. Was able to walk with PT.      Continue current management, FU surgery, heme-onc, hospice recs    Eric Antony MD, PGY-1  [facility]  01/25/18 3:23 PM

## 2018-01-25 NOTE — PROGRESS NOTES
Care Management Interventions  PCP Verified by CM: Yes (Dr Usman Rubio)  Mode of Transport at Discharge: Other (see comment) (TBD)  Transition of Care Consult (CM Consult): [facility] ([facility] with [address] [place] [address])  [facility]: No  Reason Outside [place]: Patient already serviced by other home care/hospice agency  Physical Therapy Consult: Yes  Occupational Therapy Consult: Yes  Current Support Network: Lives Alone, Family Lives Nearby  Confirm Follow Up Transport: Other (see comment) (TBD)  Freedom of Choice Offered: Yes  Discharge Location  Discharge Placement: Home with hospice     Sister [place] - [phone] home,  [phone] cell  Brother in [facility] - [phone]    Pt admitted here for intractable abd pain. He was referred to [facility] yesterday by his PCP for pain secondary to metastatic bladder cancer. His oncologist is Dr Gen Lehman. His last chemo TX's were in 2014. [facility] liaison is Shirley Gómez [phone]. Informed Erendira Overall with  Hospice that [facility] is already involved. Pt lives alone but his family is nearby. His sister is a retired oncology nurse. He is mainly independent at home but his family is available whenever he requires assistance or transportation. Surgery is pending for either today or tomorrow for bowel resection with colostomy to aid in alleviating pain. Dr Cain Garrett has discussed plan with pt's brother in law, [employer].

## 2018-01-25 NOTE — HOSPICE
Hospice referral is appreciated. Pt seen in his room sitting up in recliner. He c/o pain in his abdomen and reminded patient that he can use his PCA and he self bolused. He stated that he is familiar with hospice because he used the service with his wife. Pt stated that he doesn't understand how he ended up here because he had been followed by [facility]. He stated that he was supposed to meet with a GI surgeon to see if he was a candidate for a surgery to remove scar tissue from radiation. Pt wants to discuss with the MDs here. He then became drowsy. Left a [employer] brochure for the patient to read at his leisure Ensured that needed items were within his reach. 1530 Informed by CM that the patient and family had already chosen [facility] to provide hospice service. They were in the process of admitting the patient when the admissions nurse sent him to [facility].

## 2018-01-25 NOTE — PROGRESS NOTES
Intern Progress Note  [facility]       Patient: Erin Pham MRN: [id number]  CSN: [id number]    YOB: 1955  Age: 57 y.o. Sex: male    DOA: 1/24/2018 LOS:  LOS: 1 day                    Subjective:     Acute events: Pt lying in bed. Pain \"not that bad\" with PCA until pt guided abdominal palpation. Reports nausea and there was reddish output from NG tube with coffee ground substance in it. Review of Systems   Constitutional: Positive for weight loss. Respiratory: Negative for shortness of breath. Cardiovascular: Negative for chest pain, palpitations and leg swelling. Gastrointestinal: Positive for abdominal pain, constipation and nausea. Negative for vomiting. Objective:      Patient Vitals for the past 24 hrs:   Temp Pulse Resp BP SpO2   01/25/18 0756 96.7 °F (35.9 °C) 69 18 138/75 98 %   01/25/18 0356 96.8 °F (36 °C) 68 19 134/75 96 %   01/24/18 2330 97.9 °F (36.6 °C) 70 18 145/79 93 %   01/24/18 2112 97.7 °F (36.5 °C) 65 17 121/69 96 %   01/24/18 1432 98 °F (36.7 °C) 75 18 109/67 96 %         Intake/Output Summary (Last 24 hours) at 01/25/18 0854  Last data filed at 01/25/18 0023   Gross per 24 hour   Intake                0 ml   Output                0 ml   Net                0 ml       Physical Exam   Constitutional: He is oriented to person, place, and time. He appears cachectic. He appears distressed. Cardiovascular: Normal rate, regular rhythm and normal heart sounds. Pulmonary/Chest: Effort normal and breath sounds normal.   Abdominal: Bowel sounds are normal. There is tenderness in the right lower quadrant, suprapubic area and left lower quadrant. There is rigidity and guarding. Musculoskeletal: He exhibits no edema or tenderness. Neurological: He is alert and oriented to person, place, and time. Skin: Skin is warm and dry.        Lab/Data Reviewed:  Recent Results (from the past 24 hour(s))   CBC WITH AUTOMATED DIFF    Collection Time: 01/24/18  3:45 PM   Result Value Ref Range    WBC 7.8 4.6 - 13.2 K/uL    RBC 5.02 4.70 - 5.50 M/uL    HGB 16.8 (H) 13.0 - 16.0 g/dL    HCT 46.8 36.0 - 48.0 %    MCV 93.2 74.0 - 97.0 FL    MCH 33.5 24.0 - 34.0 PG    MCHC 35.9 31.0 - 37.0 g/dL    RDW 12.8 11.6 - 14.5 %    PLATELET 206 924 - 543 K/uL    MPV 9.9 9.2 - 11.8 FL    NEUTROPHILS 72 40 - 73 %    LYMPHOCYTES 16 (L) 21 - 52 %    MONOCYTES 11 (H) 3 - 10 %    EOSINOPHILS 1 0 - 5 %    BASOPHILS 0 0 - 2 %    ABS. NEUTROPHILS 5.6 1.8 - 8.0 K/UL    ABS. LYMPHOCYTES 1.2 0.9 - 3.6 K/UL    ABS. MONOCYTES 0.8 0.05 - 1.2 K/UL    ABS. EOSINOPHILS 0.1 0.0 - 0.4 K/UL    ABS. BASOPHILS 0.0 0.0 - 0.1 K/UL    DF AUTOMATED     METABOLIC PANEL, COMPREHENSIVE    Collection Time: 01/24/18  3:45 PM   Result Value Ref Range    Sodium 138 136 - 145 mmol/L    Potassium 3.6 3.5 - 5.5 mmol/L    Chloride 99 (L) 100 - 108 mmol/L    CO2 34 (H) 21 - 32 mmol/L    Anion gap 5 3.0 - 18 mmol/L    Glucose 142 (H) 74 - 99 mg/dL    BUN 20 (H) 7.0 - 18 MG/DL    Creatinine 0.99 0.6 - 1.3 MG/DL    BUN/Creatinine ratio 20 12 - 20      GFR est AA >60 >60 ml/min/1.73m2    GFR est non-AA >60 >60 ml/min/1.73m2    Calcium 9.6 8.5 - 10.1 MG/DL    Bilirubin, total 1.0 0.2 - 1.0 MG/DL    ALT (SGPT) 20 16 - 61 U/L    AST (SGOT) 28 15 - 37 U/L    Alk.  phosphatase 75 45 - 117 U/L    Protein, total 7.7 6.4 - 8.2 g/dL    Albumin 3.3 (L) 3.4 - 5.0 g/dL    Globulin 4.4 (H) 2.0 - 4.0 g/dL    A-G Ratio 0.8 0.8 - 1.7     LIPASE    Collection Time: 01/24/18  3:45 PM   Result Value Ref Range    Lipase 67 (L) 73 - 393 U/L   POC LACTIC ACID    Collection Time: 01/24/18  3:52 PM   Result Value Ref Range    Lactic Acid (POC) 2.3 (HH) 0.4 - 2.0 mmol/L   POC LACTIC ACID    Collection Time: 01/24/18  7:36 PM   Result Value Ref Range    Lactic Acid (POC) 0.7 0.4 - 2.0 mmol/L   METABOLIC PANEL, BASIC    Collection Time: 01/25/18  4:52 AM   Result Value Ref Range    Sodium 141 136 - 145 mmol/L    Potassium 2.8 (LL) 3.5 - 5.5 mmol/L Chloride 106 100 - 108 mmol/L    CO2 28 21 - 32 mmol/L    Anion gap 7 3.0 - 18 mmol/L    Glucose 97 74 - 99 mg/dL    BUN 18 7.0 - 18 MG/DL    Creatinine 0.65 0.6 - 1.3 MG/DL    BUN/Creatinine ratio 28 (H) 12 - 20      GFR est AA >60 >60 ml/min/1.73m2    GFR est non-AA >60 >60 ml/min/1.73m2    Calcium 8.2 (L) 8.5 - 10.1 MG/DL   CBC WITH AUTOMATED DIFF    Collection Time: 01/25/18  4:52 AM   Result Value Ref Range    WBC 5.7 4.6 - 13.2 K/uL    RBC 4.64 (L) 4.70 - 5.50 M/uL    HGB 15.4 13.0 - 16.0 g/dL    HCT 43.9 36.0 - 48.0 %    MCV 94.6 74.0 - 97.0 FL    MCH 33.2 24.0 - 34.0 PG    MCHC 35.1 31.0 - 37.0 g/dL    RDW 12.7 11.6 - 14.5 %    PLATELET 389 184 - 752 K/uL    MPV 10.2 9.2 - 11.8 FL    NEUTROPHILS 60 40 - 73 %    LYMPHOCYTES 24 21 - 52 %    MONOCYTES 12 (H) 3 - 10 %    EOSINOPHILS 3 0 - 5 %    BASOPHILS 1 0 - 2 %    ABS. NEUTROPHILS 3.5 1.8 - 8.0 K/UL    ABS. LYMPHOCYTES 1.4 0.9 - 3.6 K/UL    ABS. MONOCYTES 0.7 0.05 - 1.2 K/UL    ABS. EOSINOPHILS 0.2 0.0 - 0.4 K/UL    ABS. BASOPHILS 0.0 0.0 - 0.1 K/UL    DF AUTOMATED         Scheduled Medications Reviewed:  Current Facility-Administered Medications   Medication Dose Route Frequency    HYDROmorphone (PF) (DILAUDID) 30 mg / 30 mL PCA   IntraVENous TITRATE    potassium chloride 10 mEq, lidocaine (PF) (XYLOCAINE) 10 mg/mL (1 %) 1 mL in 0.9% sodium chloride 100 mL IVPB   IntraVENous Q1H    enoxaparin (LOVENOX) injection 40 mg  40 mg SubCUTAneous Q24H    nicotine (NICODERM CQ) 14 mg/24 hr patch 1 Patch  1 Patch TransDERmal DAILY    sodium chloride (NS) flush 5-10 mL  5-10 mL IntraVENous Q8H    0.9% sodium chloride infusion  100 mL/hr IntraVENous CONTINUOUS         Imaging, microbiology, and EKG/Telemetry:  KUB: Ileus vs. evolving obstruction involving the distal colon-rectum.   CT AP: Developing colonic obstruction with focal transition at the rectosigmoid junction region and underlying abnormal rectal wall thickening, enhancement and increased perirectal fat stranding. Abnormal appearance of the ureters and bladder. Most likely related to the patient's known bladder CA. Increasing adenopathy. The right groin node is distinctly new compared to the previous study. Increasing number of enhancing nodes in both groin regions. Developing lytic lesions in the skeletal structures suggestive of skeletal metastasis. Multiple lung nodules. Assessment/Plan     57 y.o. male with PMH of malignant bladder cancer, urinary incontinence, T2DM, neuropathy, chronic constipation, nicotine dependence and alcohol abuse, now admitted with large bowel obstruction.     Large Bowel Obstruction 2/2 metastasis vs post-radiatio changes vs stricture: Followed by Dr. Deidra Wahl (GI) as an outpatient. Patient has a h/o metatastic bladder cancer.   - General surgery consulted, contacting colorectal surgery  - IVF: LR @ 100 cc/hr, reassess daily  - NPO for bowel rest  - NG tube compression  - Pain control: Dilaudid PCA pump, titrate appropriately  - IV phenergan 25 mg q6h PRN for nausea/vomiting  - Monitor VS, per unit routine     Malignant Bladder Cancer/Urinary Incontinence: Patient is followed by Dr. Shahla Crowley (Urology), Dr. Roopa Garcia (Heme-Onc), & Dr. Bibi Read (Radiation Onc) as outpatient. - Consulted Heme-Onc- appreciate recs  - Consulted Palliative Care- appreciate recs  - Consulted Urology- appreciate recs   - Hold home oxybutynin XL 10 mg daily while NPO     Chemotherapy-induced Neuropathy:  - Hold home gabapentin 600 mg TID while NPO     Chronic Constipation: Colonoscopy (1/08/18) showed congestion and scarring in the rectum and large load of fecal impaction noted proximal to area of stricture. - Hold home col-rite 100 mg daily + miralax 17 g daily while NPO  - Start dulcolax suppository 10 mg daily PRN     Nicotine Dependence/Alcohol Abuse: Patient has a 43 pack year history and currently smokes about 1 ppd.  Patient has a history of alcohol abuse with current questionable consumption.  - Nicotine 14 mg/24 hr patch  - CIWA Protocol  - Encourage smoking cessation + alcohol abstinence     Diet: NPO  DVT Prophylaxis: Lovenox  Code Status: DNR  Point of Contact: Khai Montoya, [phone], Sister                                                   Henrietta Christiansen, [phone], Brother-in-Law     Disposition and anticipated LOS: >2 midnights    Deena Varghese MD, PGY-1  [facility]  01/25/18 8:54 AM

## 2018-01-25 NOTE — PROGRESS NOTES
8255- Pt in 10/10 pain when awake, drifts to sleep easily with occasional moaning. Condom cath placed. NGT flushed, placement verified, to LIWS, no output. Pt answers some questions, does follow some commands. Bed alarm on.    0600-No S/S ETOH W/D. ABD pain persists with PCA, pt needing frequent reminders on PCA use.

## 2018-01-25 NOTE — ED NOTES
Pt continuously moaning,  Patent incontinent of small amount of urine that is constantly dripping. Pt given diaper. Explained to patient that he is being admitted and needs a ng tube placced. Pt has questions.   Dr Jordy Randall informed and will be in to talk to patient

## 2018-01-25 NOTE — CONSULTS
[facility]  General Surgery    Subjective:      HPI:  Pt is a 56 yo male with a PMHx of DM, diabetic neuropathy, tobacco abuse, alcohol abuse, bladder cancer s/p chemo and radiation. He was admitted to [facility] last evening with c/o of abdominal pain and constipation. I reviewed the CT abd/pel independently on the monitor which reveals worsening rectal edema and narrowing compared to the CT abd/pel from Oct 2017. The gastrograffin enema reveals a long segment stenosis of the rectum.       Patient Active Problem List    Diagnosis Date Noted    SBO (small bowel obstruction) 01/24/2018    Abdominal pain 01/24/2018    Metastatic disease ([address] 01/24/2018    Intractable abdominal pain 01/24/2018    Large bowel obstruction 01/24/2018    Chemotherapy-induced neuropathy ([address] 01/24/2018    Alcohol abuse 01/24/2018    Symptomatic anemia 06/10/2016    Cigarette smoker 03/15/2016    Anemia due to blood loss 11/24/2014    Neuropathy 11/24/2014    Diabetes ([address] 11/24/2014    Urinary retention 07/02/2014    Bladder cancer ([address] 08/07/2013    Neoplasm of bladder 01/04/2013    Gross hematuria 12/12/2012    Bladder mass 44/06/8495    Renal colic 08/26/4429    Nicotine dependence 03/27/2009     Past Medical History:   Diagnosis Date    Acute sinusitis, unspecified     Alcohol abuse, unspecified     Bladder cancer ([address] 2013    chemo & radiation    Cough     Depressive disorder     Hematuria     HLD (hyperlipidemia)     Impotence of organic origin     Insomnia, unspecified     Other abnormal glucose     Other seborrheic keratosis     Pain in joint, shoulder region       Past Surgical History:   Procedure Laterality Date    COLONOSCOPY N/A 1/8/2018    COLONOSCOPY with disimpaction performed by Kirsty Jarvis MD at [address]  12/9/2014    Dr. Jimena Ruiz    HX COLONOSCOPY      HX OTHER SURGICAL  01/04/2017    Karen AGUILAR) faraz tendon, had surgery at [address]  01/04/13    [facility] [address] 89, TURBT - low-grade stage TA, Dr Eliot Ortiz  05/27/14    [facility], Cysto-TURP, Transurethral resection/multiple bladder biopsies - Papillary Urothelial Hyperplasia, Dr Justin Mata  1/29/15    Dr. Ima Meckel - clot evacuation/fulguration      Family History   Problem Relation Age of Onset    Hypertension Mother     Kidney Disease Father     Heart Disease Father     Diabetes Sister     Hypertension Brother     Stroke Brother       Social History   Substance Use Topics    Smoking status: Current Some Day Smoker     Packs/day: 1.00     Years: 43.00     Types: Cigarettes    Smokeless tobacco: Never Used      Comment: using electronic cigarettes & nicotine patch    Alcohol use 0.0 oz/week      Comment: occassional      Allergies   Allergen Reactions    Codeine Nausea and Vomiting     \"ONLY IN VERY HIGH DOSES\"        Macrodantin [Nitrofurantoin Macrocrystal] Diarrhea, Nausea and Vomiting and Other (comments)     Made pt feel very confused    Other Plant, Animal, Environmental Other (comments)    Oxycodone Nausea and Vomiting     Other reaction(s): gi distress  \"ONLY IN VERY HIGH DOSES\"  \"ONLY IN VERY HIGH DOSES\"      Percocet [Oxycodone-Acetaminophen] Nausea and Vomiting     \"ONLY IN VERY HIGH DOSES\"        Pollen Extracts Runny Nose    Pravachol [Pravastatin] Nausea Only     Other reaction(s): gi distress    Vicodin [Hydrocodone-Acetaminophen] Nausea and Vomiting     Other reaction(s): gi distress  \"ONLY IN VERY HIGH DOSES\"  \"ONLY IN VERY HIGH DOSES\"      Zoloft [Sertraline] Nausea and Vomiting     Other reaction(s): gi distress       Prior to Admission medications    Medication Sig Start Date End Date Taking? Authorizing Provider   docusate sodium (COL-RITE) 100 mg capsule Take 100 mg by mouth daily. Yes Historical Provider   polyethylene glycol (MIRALAX) 17 gram packet Take 17 g by mouth daily.    Yes Historical Provider   fentaNYL (DURAGESIC) 25 mcg/hr PATCH 1 Patch by TransDERmal route every seventy-two (72) hours. Yes Historical Provider   oxyCODONE IR (ROXICODONE) 10 mg tab immediate release tablet take 1 tablet by mouth four times a day if needed 12/1/17   Historical Provider   oxybutynin chloride XL (DITROPAN XL) 10 mg CR tablet Take 1 Tab by mouth daily. 10/11/17   Kalani Mackey MD   gabapentin (NEURONTIN) 300 mg capsule Take 600 mg by mouth three (3) times daily. Indications: NEUROPATHIC PAIN 1/19/17   Historical Provider       Review of Systems:    14 systems were reviewed. The results are as above in the HPI and otherwise negative. Objective:     Vitals:    01/24/18 2330 01/24/18 2347 01/25/18 0356 01/25/18 0756   BP: 145/79  134/75 138/75   Pulse: 70  68 69   Resp: 18 19 18   Temp: 97.9 °F (36.6 °C)  96.8 °F (36 °C) 96.7 °F (35.9 °C)   SpO2: 93%  96% 98%   Weight:  64 kg (141 lb)     Height:  5' 9\" (1.753 m)         Physical Exam:  GENERAL: alert, cooperative, no distress, appears older than stated age,   EYE: conjunctivae/corneas clear. PERRL, EOM's intact. THROAT & NECK: normal and no erythema or exudates noted. ,    LYMPHATIC: Cervical, supraclavicular, and axillary nodes normal. ,   LUNG: clear to auscultation bilaterally  HEART: regular rate and rhythm, S1, S2 normal, no murmur, click, rub or gallop,   ABDOMEN: distended, firm with diffuse tenderness. EXTREMITIES:  extremities normal, atraumatic, no cyanosis or edema,   SKIN: Normal.,   NEUROLOGIC: AOx3. Cranial nerves 2-12 and sensation grossly intact. ,     Data Review:    Recent Results (from the past 24 hour(s))   CBC WITH AUTOMATED DIFF    Collection Time: 01/24/18  3:45 PM   Result Value Ref Range    WBC 7.8 4.6 - 13.2 K/uL    RBC 5.02 4.70 - 5.50 M/uL    HGB 16.8 (H) 13.0 - 16.0 g/dL    HCT 46.8 36.0 - 48.0 %    MCV 93.2 74.0 - 97.0 FL    MCH 33.5 24.0 - 34.0 PG    MCHC 35.9 31.0 - 37.0 g/dL    RDW 12.8 11.6 - 14.5 %    PLATELET 026 135 - 420 K/uL    MPV 9.9 9.2 - 11.8 FL    NEUTROPHILS 72 40 - 73 %    LYMPHOCYTES 16 (L) 21 - 52 %    MONOCYTES 11 (H) 3 - 10 %    EOSINOPHILS 1 0 - 5 %    BASOPHILS 0 0 - 2 %    ABS. NEUTROPHILS 5.6 1.8 - 8.0 K/UL    ABS. LYMPHOCYTES 1.2 0.9 - 3.6 K/UL    ABS. MONOCYTES 0.8 0.05 - 1.2 K/UL    ABS. EOSINOPHILS 0.1 0.0 - 0.4 K/UL    ABS. BASOPHILS 0.0 0.0 - 0.1 K/UL    DF AUTOMATED     METABOLIC PANEL, COMPREHENSIVE    Collection Time: 01/24/18  3:45 PM   Result Value Ref Range    Sodium 138 136 - 145 mmol/L    Potassium 3.6 3.5 - 5.5 mmol/L    Chloride 99 (L) 100 - 108 mmol/L    CO2 34 (H) 21 - 32 mmol/L    Anion gap 5 3.0 - 18 mmol/L    Glucose 142 (H) 74 - 99 mg/dL    BUN 20 (H) 7.0 - 18 MG/DL    Creatinine 0.99 0.6 - 1.3 MG/DL    BUN/Creatinine ratio 20 12 - 20      GFR est AA >60 >60 ml/min/1.73m2    GFR est non-AA >60 >60 ml/min/1.73m2    Calcium 9.6 8.5 - 10.1 MG/DL    Bilirubin, total 1.0 0.2 - 1.0 MG/DL    ALT (SGPT) 20 16 - 61 U/L    AST (SGOT) 28 15 - 37 U/L    Alk.  phosphatase 75 45 - 117 U/L    Protein, total 7.7 6.4 - 8.2 g/dL    Albumin 3.3 (L) 3.4 - 5.0 g/dL    Globulin 4.4 (H) 2.0 - 4.0 g/dL    A-G Ratio 0.8 0.8 - 1.7     LIPASE    Collection Time: 01/24/18  3:45 PM   Result Value Ref Range    Lipase 67 (L) 73 - 393 U/L   POC LACTIC ACID    Collection Time: 01/24/18  3:52 PM   Result Value Ref Range    Lactic Acid (POC) 2.3 (HH) 0.4 - 2.0 mmol/L   POC LACTIC ACID    Collection Time: 01/24/18  7:36 PM   Result Value Ref Range    Lactic Acid (POC) 0.7 0.4 - 2.0 mmol/L   METABOLIC PANEL, BASIC    Collection Time: 01/25/18  4:52 AM   Result Value Ref Range    Sodium 141 136 - 145 mmol/L    Potassium 2.8 (LL) 3.5 - 5.5 mmol/L    Chloride 106 100 - 108 mmol/L    CO2 28 21 - 32 mmol/L    Anion gap 7 3.0 - 18 mmol/L    Glucose 97 74 - 99 mg/dL    BUN 18 7.0 - 18 MG/DL    Creatinine 0.65 0.6 - 1.3 MG/DL    BUN/Creatinine ratio 28 (H) 12 - 20      GFR est AA >60 >60 ml/min/1.73m2    GFR est non-AA >60 >60 ml/min/1.73m2    Calcium 8.2 (L) 8.5 - 10.1 MG/DL   CBC WITH AUTOMATED DIFF    Collection Time: 01/25/18  4:52 AM   Result Value Ref Range    WBC 5.7 4.6 - 13.2 K/uL    RBC 4.64 (L) 4.70 - 5.50 M/uL    HGB 15.4 13.0 - 16.0 g/dL    HCT 43.9 36.0 - 48.0 %    MCV 94.6 74.0 - 97.0 FL    MCH 33.2 24.0 - 34.0 PG    MCHC 35.1 31.0 - 37.0 g/dL    RDW 12.7 11.6 - 14.5 %    PLATELET 772 432 - 218 K/uL    MPV 10.2 9.2 - 11.8 FL    NEUTROPHILS 60 40 - 73 %    LYMPHOCYTES 24 21 - 52 %    MONOCYTES 12 (H) 3 - 10 %    EOSINOPHILS 3 0 - 5 %    BASOPHILS 1 0 - 2 %    ABS. NEUTROPHILS 3.5 1.8 - 8.0 K/UL    ABS. LYMPHOCYTES 1.4 0.9 - 3.6 K/UL    ABS. MONOCYTES 0.7 0.05 - 1.2 K/UL    ABS. EOSINOPHILS 0.2 0.0 - 0.4 K/UL    ABS. 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 Plan:     · Exploratory laparotomy with loop descending colostomy.   · Consent on chart  · Preoperative orders written    Signed By: Lara Ball MD     January 25, 2018

## 2018-01-26 LAB
ANION GAP SERPL CALC-SCNC: 2 MMOL/L (ref 3–18)
ANION GAP SERPL CALC-SCNC: 6 MMOL/L (ref 3–18)
BASOPHILS # BLD: 0 K/UL (ref 0–0.1)
BASOPHILS NFR BLD: 0 % (ref 0–2)
BUN BLD-MCNC: 21 MG/DL (ref 7–18)
BUN SERPL-MCNC: 20 MG/DL (ref 7–18)
BUN SERPL-MCNC: 23 MG/DL (ref 7–18)
BUN/CREAT SERPL: 32 (ref 12–20)
BUN/CREAT SERPL: 36 (ref 12–20)
CALCIUM SERPL-MCNC: 8.1 MG/DL (ref 8.5–10.1)
CALCIUM SERPL-MCNC: 8.5 MG/DL (ref 8.5–10.1)
CHLORIDE BLD-SCNC: 109 MMOL/L (ref 100–108)
CHLORIDE SERPL-SCNC: 111 MMOL/L (ref 100–108)
CHLORIDE SERPL-SCNC: 112 MMOL/L (ref 100–108)
CO2 SERPL-SCNC: 33 MMOL/L (ref 21–32)
CO2 SERPL-SCNC: 34 MMOL/L (ref 21–32)
CREAT SERPL-MCNC: 0.55 MG/DL (ref 0.6–1.3)
CREAT SERPL-MCNC: 0.72 MG/DL (ref 0.6–1.3)
DIFFERENTIAL METHOD BLD: ABNORMAL
EOSINOPHIL # BLD: 0 K/UL (ref 0–0.4)
EOSINOPHIL NFR BLD: 0 % (ref 0–5)
ERYTHROCYTE [DISTWIDTH] IN BLOOD BY AUTOMATED COUNT: 12.9 % (ref 11.6–14.5)
GLUCOSE BLD STRIP.AUTO-MCNC: 138 MG/DL (ref 74–106)
GLUCOSE SERPL-MCNC: 107 MG/DL (ref 74–99)
GLUCOSE SERPL-MCNC: 130 MG/DL (ref 74–99)
HCT VFR BLD AUTO: 43.2 % (ref 36–48)
HCT VFR BLD CALC: 41 % (ref 36–49)
HGB BLD-MCNC: 13.9 G/DL (ref 12–16)
HGB BLD-MCNC: 14.8 G/DL (ref 13–16)
LYMPHOCYTES # BLD: 0.6 K/UL (ref 0.9–3.6)
LYMPHOCYTES NFR BLD: 5 % (ref 21–52)
MCH RBC QN AUTO: 33.2 PG (ref 24–34)
MCHC RBC AUTO-ENTMCNC: 34.3 G/DL (ref 31–37)
MCV RBC AUTO: 96.9 FL (ref 74–97)
MONOCYTES # BLD: 0.8 K/UL (ref 0.05–1.2)
MONOCYTES NFR BLD: 8 % (ref 3–10)
NEUTS SEG # BLD: 9.1 K/UL (ref 1.8–8)
NEUTS SEG NFR BLD: 87 % (ref 40–73)
PLATELET # BLD AUTO: 261 K/UL (ref 135–420)
PMV BLD AUTO: 9.9 FL (ref 9.2–11.8)
POTASSIUM BLD-SCNC: 3.9 MMOL/L (ref 3.5–5.5)
POTASSIUM SERPL-SCNC: 3.1 MMOL/L (ref 3.5–5.5)
POTASSIUM SERPL-SCNC: 3.3 MMOL/L (ref 3.5–5.5)
RBC # BLD AUTO: 4.46 M/UL (ref 4.7–5.5)
SODIUM BLD-SCNC: 145 MMOL/L (ref 136–145)
SODIUM SERPL-SCNC: 148 MMOL/L (ref 136–145)
SODIUM SERPL-SCNC: 150 MMOL/L (ref 136–145)
WBC # BLD AUTO: 10.5 K/UL (ref 4.6–13.2)

## 2018-01-26 PROCEDURE — 80048 BASIC METABOLIC PNL TOTAL CA: CPT

## 2018-01-26 PROCEDURE — 74011250636 HC RX REV CODE- 250/636: Performed by: STUDENT IN AN ORGANIZED HEALTH CARE EDUCATION/TRAINING PROGRAM

## 2018-01-26 PROCEDURE — 77010033678 HC OXYGEN DAILY: Performed by: FAMILY MEDICINE

## 2018-01-26 PROCEDURE — 74011250636 HC RX REV CODE- 250/636: Performed by: EMERGENCY MEDICINE

## 2018-01-26 PROCEDURE — 74011250637 HC RX REV CODE- 250/637: Performed by: STUDENT IN AN ORGANIZED HEALTH CARE EDUCATION/TRAINING PROGRAM

## 2018-01-26 PROCEDURE — 74011250636 HC RX REV CODE- 250/636: Performed by: FAMILY MEDICINE

## 2018-01-26 PROCEDURE — 74011000250 HC RX REV CODE- 250: Performed by: FAMILY MEDICINE

## 2018-01-26 PROCEDURE — 74011000258 HC RX REV CODE- 258: Performed by: STUDENT IN AN ORGANIZED HEALTH CARE EDUCATION/TRAINING PROGRAM

## 2018-01-26 PROCEDURE — 80048 BASIC METABOLIC PNL TOTAL CA: CPT | Performed by: FAMILY MEDICINE

## 2018-01-26 PROCEDURE — 65660000004 HC RM CVT STEPDOWN

## 2018-01-26 PROCEDURE — 36415 COLL VENOUS BLD VENIPUNCTURE: CPT | Performed by: FAMILY MEDICINE

## 2018-01-26 PROCEDURE — 77030011641 HC PASTE OST ADH BMS -A

## 2018-01-26 PROCEDURE — 77030010811

## 2018-01-26 PROCEDURE — 97166 OT EVAL MOD COMPLEX 45 MIN: CPT

## 2018-01-26 PROCEDURE — 77030010520

## 2018-01-26 PROCEDURE — 77030012861 HC BG OSTMY KT BMS -A

## 2018-01-26 PROCEDURE — 74011000258 HC RX REV CODE- 258: Performed by: SURGERY

## 2018-01-26 PROCEDURE — 85025 COMPLETE CBC W/AUTO DIFF WBC: CPT | Performed by: SURGERY

## 2018-01-26 PROCEDURE — 74011000258 HC RX REV CODE- 258: Performed by: EMERGENCY MEDICINE

## 2018-01-26 PROCEDURE — 77030010522

## 2018-01-26 PROCEDURE — 97530 THERAPEUTIC ACTIVITIES: CPT

## 2018-01-26 PROCEDURE — 74011000258 HC RX REV CODE- 258: Performed by: FAMILY MEDICINE

## 2018-01-26 PROCEDURE — 74011250636 HC RX REV CODE- 250/636: Performed by: SURGERY

## 2018-01-26 RX ORDER — DEXTROSE MONOHYDRATE AND SODIUM CHLORIDE 5; .45 G/100ML; G/100ML
100 INJECTION, SOLUTION INTRAVENOUS CONTINUOUS
Status: DISCONTINUED | OUTPATIENT
Start: 2018-01-26 | End: 2018-01-28

## 2018-01-26 RX ORDER — LORAZEPAM 2 MG/ML
1 INJECTION INTRAMUSCULAR
Status: DISCONTINUED | OUTPATIENT
Start: 2018-01-26 | End: 2018-01-27

## 2018-01-26 RX ORDER — FAMOTIDINE 10 MG/ML
20 INJECTION INTRAVENOUS DAILY
Status: DISCONTINUED | OUTPATIENT
Start: 2018-01-27 | End: 2018-02-01 | Stop reason: HOSPADM

## 2018-01-26 RX ADMIN — Medication 10 ML: at 22:17

## 2018-01-26 RX ADMIN — LIDOCAINE HYDROCHLORIDE: 10 INJECTION, SOLUTION EPIDURAL; INFILTRATION; INTRACAUDAL; PERINEURAL at 12:59

## 2018-01-26 RX ADMIN — LIDOCAINE HYDROCHLORIDE: 10 INJECTION, SOLUTION EPIDURAL; INFILTRATION; INTRACAUDAL; PERINEURAL at 18:00

## 2018-01-26 RX ADMIN — SODIUM CHLORIDE, SODIUM LACTATE, POTASSIUM CHLORIDE, AND CALCIUM CHLORIDE 100 ML/HR: 600; 310; 30; 20 INJECTION, SOLUTION INTRAVENOUS at 06:47

## 2018-01-26 RX ADMIN — LORAZEPAM 2 MG: 2 INJECTION INTRAMUSCULAR; INTRAVENOUS at 08:45

## 2018-01-26 RX ADMIN — DEXTROSE MONOHYDRATE AND SODIUM CHLORIDE 100 ML/HR: 5; .45 INJECTION, SOLUTION INTRAVENOUS at 20:44

## 2018-01-26 RX ADMIN — AMPICILLIN AND SULBACTAM 3 G: 1; 2 INJECTION, POWDER, FOR SOLUTION INTRAMUSCULAR; INTRAVENOUS at 18:03

## 2018-01-26 RX ADMIN — LORAZEPAM 1 MG: 2 INJECTION INTRAMUSCULAR; INTRAVENOUS at 19:48

## 2018-01-26 RX ADMIN — LIDOCAINE HYDROCHLORIDE: 10 INJECTION, SOLUTION EPIDURAL; INFILTRATION; INTRACAUDAL; PERINEURAL at 19:49

## 2018-01-26 RX ADMIN — LORAZEPAM 2 MG: 2 INJECTION INTRAMUSCULAR; INTRAVENOUS at 04:21

## 2018-01-26 RX ADMIN — ENOXAPARIN SODIUM 40 MG: 40 INJECTION SUBCUTANEOUS at 20:41

## 2018-01-26 RX ADMIN — PROMETHAZINE HYDROCHLORIDE 25 MG: 25 INJECTION INTRAMUSCULAR; INTRAVENOUS at 20:49

## 2018-01-26 RX ADMIN — THIAMINE HYDROCHLORIDE 50 MG: 100 INJECTION, SOLUTION INTRAMUSCULAR; INTRAVENOUS at 20:46

## 2018-01-26 RX ADMIN — KETOROLAC TROMETHAMINE 15 MG: 30 INJECTION, SOLUTION INTRAMUSCULAR at 22:17

## 2018-01-26 RX ADMIN — LORAZEPAM 2 MG: 2 INJECTION INTRAMUSCULAR; INTRAVENOUS at 06:02

## 2018-01-26 RX ADMIN — LIDOCAINE HYDROCHLORIDE: 10 INJECTION, SOLUTION EPIDURAL; INFILTRATION; INTRACAUDAL; PERINEURAL at 11:40

## 2018-01-26 RX ADMIN — Medication 10 ML: at 06:00

## 2018-01-26 RX ADMIN — KETOROLAC TROMETHAMINE 15 MG: 30 INJECTION, SOLUTION INTRAMUSCULAR at 14:00

## 2018-01-26 RX ADMIN — KETOROLAC TROMETHAMINE 15 MG: 30 INJECTION, SOLUTION INTRAMUSCULAR at 06:02

## 2018-01-26 RX ADMIN — AMPICILLIN AND SULBACTAM 3 G: 1; 2 INJECTION, POWDER, FOR SOLUTION INTRAMUSCULAR; INTRAVENOUS at 06:47

## 2018-01-26 RX ADMIN — DEXTROSE MONOHYDRATE AND SODIUM CHLORIDE 100 ML/HR: 5; .45 INJECTION, SOLUTION INTRAVENOUS at 11:00

## 2018-01-26 RX ADMIN — AMPICILLIN AND SULBACTAM 3 G: 1; 2 INJECTION, POWDER, FOR SOLUTION INTRAMUSCULAR; INTRAVENOUS at 00:00

## 2018-01-26 NOTE — PROGRESS NOTES
Patient out of bed, confused and agitated, attempting to pull NGT and IV out. Administered PRN IV ativan.

## 2018-01-26 NOTE — PROGRESS NOTES
Patient pulled out NGT, trying to get out of bed, tugging on winslow catheter, ripped off colostomy bag. Paged [facility].

## 2018-01-26 NOTE — PROGRESS NOTES
Problem: Self Care Deficits Care Plan (Adult)  Goal: *Acute Goals and Plan of Care (Insert Text)  Occupational Therapy Goals  Initiated 1/26/2018 within 7 day(s). 1.  Patient will perform grooming with supervision/set-up   2. Patient will perform upper body dressing with supervision/set-up. 3.  Patient will perform lower body dressing with supervision/set-up with adaptive strategies   4. Patient will perform toilet transfers with supervision/set-up. 5.  Patient will perform all aspects of toileting with supervision/set-up. 6.  Patient will participate in upper extremity therapeutic exercise/activities with supervision/set-up in preparation for self care tasks  Outcome: Progressing Towards Goal  Occupational Therapy EVALUATION    Patient: Ashia Blanco (81 y.o. male)  Date: 1/26/2018  Primary Diagnosis: SBO (small bowel obstruction)  Abdominal pain  Bladder cancer ([facility] [address]  Metastatic disease ([facility] [address]  Intractable abdominal pain  DX  Procedure(s) (LRB):  LOOP COLOSTOMY FROM DESCENDING COLON (N/A) 1 Day Post-Op   Precautions:   Aspiration    ASSESSMENT :  Based on the objective data described below, the patient was in bed upon arrival with family present. Patient has wrist restraints doffed per nursing, only when family is present. Educated family to let nursing know when they leave so nursing can belkys restraints on patient, family understood. Patient was willing to work with OT. Patient needed min A for LE's during bed mobility and to sit up on EOB. Patient noted to be shaking. Patient has decreased, but functional BUE AROM and  strength. Patient is oriented to person and needs min/mod cues to follow commands. Patient needed min A x2 to stand and simulate BSC transfer (side steps) with HHA. Patient required mod A to doff old gown(soiled) and belkys new gown sitting on EOB. Patient assisted back to bed with min A. Patient left comfortable in no distress with family present.  Bed alarm activated and nurse notified. Patient will benefit from skilled intervention to address the above impairments. Patients rehabilitation potential is considered to be Good  Factors which may influence rehabilitation potential include:   []             None noted  []             Mental ability/status  [x]             Medical condition  []             Home/family situation and support systems  []             Safety awareness  []             Pain tolerance/management  []             Other:      PLAN :  Recommendations and Planned Interventions:  [x]               Self Care Training                  [x]        Therapeutic Activities  [x]               Functional Mobility Training    []        Cognitive Retraining  [x]               Therapeutic Exercises           [x]        Endurance Activities  [x]               Balance Training                   []        Neuromuscular Re-Education  []               Visual/Perceptual Training     [x]   Home Safety Training  [x]               Patient Education                 []        Family Training/Education  []               Other (comment):    Frequency/Duration: Patient will be followed by occupational therapy 1-2 times per day/4-7 days per week to address goals. Discharge Recommendations: Rafael Slater  Further Equipment Recommendations for Discharge: TBD     Barriers to Learning/Limitations: yes;  altered mental status (i.e.Sedation, Confusion), physical  Compensate with: visual, verbal, tactile, kinesthetic cues/model     PATIENT COMPLEXITY      Eval Complexity: History: MEDIUM Complexity : Expanded review of history including physical, cognitive and psychosocial  history ; Examination: MEDIUM Complexity : 3-5 performance deficits relating to physical, cognitive , or psychosocial skils that result in activity limitations and / or participation restrictions; Decision Making:MEDIUM Complexity : Patient may present with comorbidities that affect occupational performnce.  Miniml to moderate modification of tasks or assistance (eg, physical or verbal ) with assesment(s) is necessary to enable patient to complete evaluation  Assessment: Moderate Complexity     G-CODES:     Self Care  Current  CK= 40-59%   Goal  CI= 1-19%. The severity rating is based on the Level of Assistance required for Functional Mobility and ADLs. SUBJECTIVE:   Patient stated I need to go to the bathroom.     OBJECTIVE DATA SUMMARY:     Past Medical History:   Diagnosis Date    Acute sinusitis, unspecified     Alcohol abuse, unspecified     Bladder cancer ([facility] [address] 2013    chemo & radiation    Cough     Depressive disorder     Hematuria     HLD (hyperlipidemia)     Impotence of organic origin     Insomnia, unspecified     Other abnormal glucose     Other seborrheic keratosis     Pain in joint, shoulder region      Past Surgical History:   Procedure Laterality Date    COLONOSCOPY N/A 1/8/2018    COLONOSCOPY with disimpaction performed by Levi Nava MD at [address] [place] [address] N/A 01/25/2018    Dr. Gatito Austin  12/9/2014    Dr. Mikala Cody [facility]  01/04/2017    Tore (R) arm tendon, had surgery at [address]  01/04/13    [facility], TURBT - low-grade stage TA, Dr Tashi Shelby  05/27/14    [facility], Cysto-TURP, Transurethral resection/multiple bladder biopsies - Papillary Urothelial Hyperplasia, Dr Althea Conner  1/29/15    Dr. Lynn Velazquez - clot evacuation/fulguration     Prior Level of Function/Home Situation: Patient unable to report PLOF, secondary to confusion.   Home Situation  Home Environment: Private residence  One/Two Story Residence: One story  Living Alone: Yes  Support Systems: Home care staff  Patient Expects to be Discharged to[de-identified] Private residence  Current DME Used/Available at Home: None  [x]  Right hand dominant   []  Left hand dominant  Cognitive/Behavioral Status:  Neurologic State: Alert  Orientation Level: Oriented to person;Disoriented to time;Disoriented to situation;Disoriented to place  Cognition: Decreased command following;Decreased attention/concentration;Poor safety awareness    Skin: No skin changes noted    Edema: No edema noted    Vision/Perceptual:       Acuity: Within Defined Limits      Coordination:  Coordination: Within functional limits (BUEs)       Balance:  Sitting: Impaired  Sitting - Static: Good (unsupported)  Sitting - Dynamic: Fair (occasional)  Standing: Impaired; With support  Standing - Static: Fair  Standing - Dynamic : Poor    Strength:  Strength: Generally decreased, functional ( strength)     Tone & Sensation:  Tone: Normal (BUEs)  Sensation: Intact (BUEs)     Range of Motion:  AROM: Generally decreased, functional (BUEs)     Functional Mobility and Transfers for ADLs:  Bed Mobility:  Supine to Sit: Minimum assistance  Sit to Supine: Minimum assistance  Scooting: Minimum assistance  Transfers:  Sit to Stand: Minimum assistance    Toilet Transfer : Minimum assistance;Assist x2 (simulated BSC transfer with HHA)       ADL Assessment:(clinical judgement)  Feeding: Supervision/set-up    Oral Facial Hygiene/Grooming: Minimum assistance    Bathing: Maximum assistance    Upper Body Dressing: Minimum assistance    Lower Body Dressing: Maximum assistance    Toileting: Maximum assistance      Pain:  Pt reports pain around winslow catheter, does not rate with a number secondary to cognition    Activity Tolerance:   Fair    Please refer to the flowsheet for vital signs taken during this treatment. After treatment:   [] Patient left in no apparent distress sitting up in chair  [x] Patient left in no apparent distress in bed  [x] Call bell left within reach  [x] Nursing notified  [x] Caregiver present  [x] Bed alarm activated    COMMUNICATION/EDUCATION:   [] Home safety education was provided and the patient/caregiver indicated understanding.   [] Patient/family have participated as able in goal setting and plan of care. [] Patient/family agree to work toward stated goals and plan of care. [x] Patient understands intent and goals of therapy, but is neutral about his/her participation. [] Patient is unable to participate in goal setting and plan of care.     Thank you for this referral.  Ruby Shi, OTR/L  Time Calculation: 18 mins

## 2018-01-26 NOTE — PROGRESS NOTES
Notified Dr. Bianca Dias patient pulled NGT out. [facility] Dr. Jose Ramirez  order to place restraints and sitter at bedside.

## 2018-01-26 NOTE — CONSULTS
[facility]    Glendia Gaucher  MR#: [id number]  : 1955  ACCOUNT #: [de-identified]   DATE OF SERVICE: 2018    REASON FOR CONSULTATION:  Large bowel obstruction in a patient with a history of locally advanced bladder cancer. CHIEF COMPLAINT ON ADMISSION:  Progressive abdominal pain and discomfort. The patient also had significant long-term constipation. HISTORY OF PRESENT ILLNESS:  The patient is a 13-year-old male who has a history of bladder cancer. He has previously been treated with outpatient systemic chemotherapy and concurrent radiation treatment. He did well initially and has more recently been lost to followup. He was last seen in the outpatient oncology clinic on 01/15/2013. More recently, attempts had been made to get the patient into a hospice program.  He came into the emergency room with complaints of significant abdominal pain and constipation. He was accompanied by his in-law. He had a CT scan of the abdomen and pelvis which revealed evidence of a developing colonic obstruction with significant underlying abdominal rectal wall thickening and increased perirectal fat stranding. This was felt to possibly be related to malignancy versus post-radiation changes or stricture from chronic inflammatory processes. He also had evidence of increasing lymphadenopathy and a lytic lesion in the skeletal suggestive of skeletal metastasis. The patient was seen by the general surgeon soon after admission and a decision was made to take him to the OR for therapeutic intervention. He underwent an evaluation of his loop colostomy from the descending colon. He was found to have evidence of rectosigmoid stenosis. However, his loop descending colostomy was found to be functioning. Today, the patient is quite confused and restless. He has restraints in place; however, he has managed to pull out his colostomy bag and is moderately confused.   He has a longstanding history of alcohol overuse and there is a possibility that he may be experiencing early signs of alcohol withdrawal.    ALLERGIES:  PATIENT IS ALLERGIC TO CODEINE, MACRODANTIN, OXYCODONE, PERCOCET, POLLEN EXTRACT, AND PRAVACHOL. MEDICATIONS:  At the time of admission, the patient had been taking docusate sodium 100 mg daily, Duragesic 25 mcg patch every 72 hours, Percocet, Roxicodone 10 mg p.r.n., Ditropan XL 10 mg daily, MiraLax 17 grams p.r.n. MEDICAL DIAGNOSES:  Locally advanced/metastatic bladder cancer, alcohol abuse, hyperlipidemia, insomnia, and chronic pain related to malignancy. PAST SURGICAL HISTORY:  Colonoscopy, cystourethroscopy with biopsies, and TURP. FAMILY HISTORY:  Notable for hypertension in his mother, kidney disease and heart disease in his father, diabetes in sister, and there is positive hypertension and stroke in his brothers. SOCIAL HISTORY:  The patient is . He continues to use at least 1 pack of cigarettes per day and he has done so for over 43 years. He continues to use alcohol on a regular basis. He denies drug use. REVIEW OF SYSTEMS:  The patient is markedly confused at this time and, therefore, I am not able to obtain a good system review. He is in restraints. PHYSICAL EXAMINATION:  GENERAL APPEARANCE:  The patient is in restraints but is confused. VITAL SIGNS:  His most recent vital signs were blood pressure 148/82, pulse 88, respiratory rate 25, temperature is 97.7. SKIN:  Reveals no bruise or rash. HEENT:  Head is normocephalic and atraumatic. Conjunctivae and sclerae are clear. Pupils are reactive to light and accommodation. EOMs are intact. ENT reveals no oral mucosal lesions or ulceration. NECK:  Supple. There is no lymphadenopathy or thyromegaly. CHEST WALL AND LUNGS:  There is symmetric chest wall expansion. The lungs are without wheeze or rhonchi.   HEART:  S1 and S2 heart sounds are normal.  No murmur or gallop. ABDOMEN:  Soft and nontender. He has undergone decompression in the OR and, therefore, the abdomen is no longer distended. There is no guarding or tenderness. EXTREMITIES:  There is no edema, cyanosis, clubbing. NEUROLOGIC:  The patient is markedly confused at this time, but otherwise no other neurologic deficits. DIAGNOSTICS:  The CT scan and KUB were reviewed in the electronic health record. The current lab data shows a CBC from today with a WBC count of 10.5, hemoglobin 14.8 g/dL, hematocrit is 43.2%, and the platelet count is 940,244. His chemistry panel showed sodium of 150, potassium 3.1, chloride 111, CO2 33, creatinine 0.72. The KUB from 01/24/2018 shows ileus versus evolving obstruction of the distal colon and rectum. A CT scan of the abdomen and pelvis from 01/24/2018 shows possible developing colonic obstruction. There is also colonic increasing abdominal lymphadenopathy and a lytic lesion in the skeletal structures suggestive of skeletal metastasis. He also has multiple lung nodules. The Gastrografin enema done on 01/25/2018 shows progressive long segment narrowing of the rectum causing upstream large and small bowel obstruction. The diagnostic considerations radiographically included radiation fibrosis, chronic inflammation, primary versus metastatic disease. ASSESSMENT AND PLAN:  The patient is a 68-year-old man with progressive metastatic bladder cancer. He was admitted with colonic obstruction due to bowel stenosis, most likely radiation treatment. He underwent an exploratory laparotomy. At this time, palliative support in the setting of hospice is the best recommendation. He was in the process of being assessed for hospice prior to admission and this should be followed through. There are no additional systemic treatment options for him at this time. The current level of supportive care should be continued. He is currently DNR.     Thanks for notifying me of his admission.       MD ANA LILIA Tapia / RAMAN  D: 01/26/2018 09:00     T: 01/26/2018 09:36  JOB #: [id number]

## 2018-01-26 NOTE — PROGRESS NOTES
No nausea or vomiting. Pain to body managed with Dilaudid PCA pump. Colostomy bag draining loose dark brown stool. Patel draining dark/teresita colored urine. 8728- Changed patient colostomy bag due to leakage. Bag continues to leak, multiple nurses attempted to prevent leak but unsuccessful.

## 2018-01-26 NOTE — ROUTINE PROCESS
Patient is awake and pulling at lines. Soft wrist restraints are intact. No acute distress noted. No c/o CP or SOB.

## 2018-01-26 NOTE — PROGRESS NOTES
Attempted tx, however pt not in room and was transferred to CVT. Will follow up as patient schedule allows. Thank you for this referral. Sherron Kraft, PT, DPT.

## 2018-01-26 NOTE — OP NOTES
[facility]  OPERATIVE REPORT    Cuco Carrillo  MR#: [id number]  : 1955  ACCOUNT #: [de-identified]   DATE OF SERVICE: 2018    PREOPERATIVE DIAGNOSIS:  Rectosigmoid stenosis. POSTOPERATIVE DIAGNOSIS:  Rectosigmoid stenosis. PROCEDURE PERFORMED:  Loop descending colostomy. SURGEON:  Swati Roy MD.      ASSISTANTS:  Tanya Alcala and Gurpreet Downing    ANESTHESIA:  General.    FINDINGS:  Functioning loop descending colon colostomy. SPECIMENS:  None. ESTIMATED BLOOD LOSS:  20 mL. FLUIDS GIVEN:  650 mL. URINE OUTPUT:  75 mL. 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 DESCRIPTION OF OPERATION:  The patient was identified in the holding area with his niece, where consent for exploratory laparotomy with a descending loop colostomy was verified. In the operating room, the patient was placed under general anesthesia. The Patel catheter was inserted. The abdomen was prepped and draped in sterile fashion using chlorhexidine solution and sterile drapes. A 3-minute drying time was allowed prior to draping. The timeout was performed to ensure correct surgical procedure. Briefing was performed to make sure all members of the team understood why we were doing the procedure. The incision was created in the left lower quadrant transversely between the umbilicus and the left anterior superior iliac crest.  This incision was carried down through skin and subcutaneous tissue using the 10 blade. The electrocautery was then used for hemostasis and dissection down to expose the fascia of the oblique muscles and the rectus muscles. The fascia at the oblique muscles was opened along with the musculature using electrocautery. The peritoneum was opened for the length of the incision. The small bowel was packed away out of the field using a couple of lap pads. Meryle Erb was placed on the descending colon. Electrocautery was used to begin dissection at the white line of Toldt and mobilize the descending colon to where it would easily fit into the incision for a colostomy. The descending colon was mobilized at the white line of Toldt up to the splenic flexure and down to the sigmoid colon. The loop mobilized nicely up into the incision. The bowel was edematous, but no evidence of metastasis was present in the field of view. The peritoneum was closed using 0 Vicryl suture in a running fashion. The fascia of the oblique muscles was closed using 0 Vicryl suture. The fascia of the rectus muscle was also closed using 0 Vicryl suture. Enough of a gap was allowed for the loop to sit in the incision without tension. The bar was placed in between an opening in the mesocolon. The skin was reapproximated using staples and the bar was situated using 2-0 nylon suture. The bowel sat without tension. The electrocautery was then used to open the wall of the colon. A 3-0 chromic suture was used through the mucosa, the seromuscular bite of the colon wall and the dermal tissues in the 12 o'clock, 3 o'clock, 6 o'clock, and 9 o'clock positions and in between at 1:30, 4:30, 7:30, and 10:30. A 3-0 Vicryl suture was then used in the same positions to secure the mucosa of the stoma to the skin. My index finger could be inserted through both the proximal and distal loop of bowel. Stool easily ran through the colon with each digitalization. The colostomy sat flush with the abdominal wall. The colostomy wafer and bag were attached. The patient tolerated the procedure well. He was extubated and stable upon transport to the recovery room.       MD JOHANN Puente / TN  D: 01/25/2018 20:09     T: 01/26/2018 05:45  JOB #: [id number]

## 2018-01-26 NOTE — WOUND CARE
Physical Exam   Room 2301: introduced myself & services to pt & niece, but this niece is from out of town & doesn't live here. Pt using 2 piece, 2 3/4\" flange system, intact at this time, eakins ring visible around stoma, extra supplies at bedside.   Mary Elliott BSN, RN, [facility] [place], [address]

## 2018-01-26 NOTE — PROGRESS NOTES
NUTRITION    Nursing Referral: [facility]  Nutrition Consult: General Nutrition Management & Supplements     RECOMMENDATIONS / PLAN:     - If patient unable to tolerate oral diet in the next 24-48 hours, consider starting parenteral nutrition if treatment is consistent with goals of care. - Continue RD inpatient monitoring and evaluation. NUTRITION INTERVENTIONS & DIAGNOSIS:     [x] IV fluid: D5 1/2 NS at 100 mL/hr (120 gm dextrose, 408 kcal/day)  [x] Coordination and referral of nutrition care: Discussed TPN recommendation with MD.    Nutrition Diagnosis: Unintentional weight loss related to inadequate energy intake and altered GI function as evidenced by patient unable to tolerate po with nausea/vomiting resulting in 24 lb, 15% weight loss x 4 months. Patient meets criteria for Severe Protein Calorie Malnutrition as evidenced by:   ASPEN Malnutrition Criteria  Acute Illness, Chronic Illness, or Social/Enviornmental: Chronic illness  Energy Intake: <75% est energy req for greater than/equal to 1 month  Weight Loss: Greater than 10% x 6 mos  ASPEN Malnutrition Score - Chronic Illness: 7  Chronic Illness - Malnutrition Diagnosis: Severe malnutrition. ASSESSMENT:     1/26: Remains NPO. S/p loop descending colostomy d/t large bowel obstruction. Pt pulled out NGT this am. Not replaced yet. Per discussion with MD no plan for oral diet today and discussed PN recommendation. 1/25: Pt c/o abdominal pain, nausea and constipation with poor po intake PTA, unable to keep food down for the past 4-5 days. Developing colonic obstruction per CT, Surgery and Palliative Medicine consulted.      Average po intake adequate to meet patients estimated nutritional needs:   [] Yes     [x] No   [] Unable to determine at this time    Diet: DIET NPO      Food Allergies: NKFA  Current Appetite:   [] Good     [] Fair     [] Poor     [x] Other: NPO  Appetite/meal intake prior to admission:   [] Good     [] Fair     [x] Poor, unable to tolerate any food or drink with nausea/vomiting x 4-5 days PTA and poor to fair meal intake before then for the last several months     [] Other:  Feeding Limitations:  [] Swallowing difficulty    [] Chewing difficulty    [] Other:  Current Meal Intake: No data found. BM: 1/25, colostomy   Skin Integrity: WDL  Edema: none   Pertinent Medications: Reviewed: phenergan, dulcolax    Recent Labs      01/26/18   0736  01/25/18   0452  01/24/18   1545   NA  150*  141  138   K  3.1*  2.8*  3.6   CL  111*  106  99*   CO2  33*  28  34*   GLU  130*  97  142*   BUN  23*  18  20*   CREA  0.72  0.65  0.99   CA  8.1*  8.2*  9.6   ALB   --    --   3.3*   SGOT   --    --   28   ALT   --    --   20       Intake/Output Summary (Last 24 hours) at 01/26/18 1547  Last data filed at 01/26/18 1200   Gross per 24 hour   Intake             1000 ml   Output             2335 ml   Net            -1335 ml       Anthropometrics:  Ht Readings from Last 1 Encounters:   01/24/18 5' 9\" (1.753 m)     Last 3 Recorded Weights in this Encounter    01/24/18 2347   Weight: 64 kg (141 lb)     Body mass index is 20.82 kg/(m^2).           Weight History: per patient and family- significant weight loss, down from previous weight of 165 lb in September 2017 (24 lb, 15% weight loss x 4 months)    Weight Metrics 1/24/2018 1/8/2018 12/6/2017 9/28/2017 9/19/2017 6/7/2017 4/11/2017   Weight 141 lb 146 lb 150 lb 164 lb 164 lb 165 lb 165 lb   BMI 20.82 kg/m2 21.56 kg/m2 22.15 kg/m2 24.22 kg/m2 24.22 kg/m2 24.37 kg/m2 24.37 kg/m2        Admitting Diagnosis: SBO (small bowel obstruction)  Abdominal pain  Bladder cancer (HCC)  Metastatic disease (HCC)  Intractable abdominal pain  DX  Pertinent PMHx: metastatic bladder cancer, DM, chronic constipation, alcohol abuse, chemotherapy induced neuropathy    Education Needs:        [x] None identified  [] Identified - Not appropriate at this time  []  Identified and addressed - refer to education log  Learning Limitations: [x] None identified  [] Identified    Cultural, Bahai & ethnic food preferences:  [x] None identified    [] Identified and addressed     ESTIMATED NUTRITION NEEDS:     Calories: 1305-5111 kcal (HBEx1.2-1.4) based on  [x] Actual BW 64 kg     [] IBW   Protein: 64-96 gm (1-1.5 gm/kg) based on  [x] Actual BW      [] IBW   Fluid: 1 mL/kcal     MONITORING & EVALUATION:     Nutrition Goal(s):   1. Nutritional needs will be met through adequate oral intake or nutrition support within the next 7 days.   Outcome:  [] Met/Ongoing    [x]  Not Met    [] New/Initial Goal     Monitoring:   [] Food and beverage intake   [x] Diet order   [x] Nutrition-focused physical findings   [x] Treatment/therapy   [] Weight   [] Enteral nutrition intake        Previous Recommendations (for follow-up assessments only):     []   Implemented       [x]   Not Implemented (RD to address)      [] No Longer Appropriate     [] No Recommendation Made     Discharge Planning: pending ability to tolerate po and goals of care  [x] Participated in care planning, discharge planning, & interdisciplinary rounds as appropriate      Jesus Madrigal RD   Pager: [phone]

## 2018-01-26 NOTE — PROGRESS NOTES
OT orders received and chart reviewed. Pt not seen for skilled OT due to:  []  Nausea/vomiting  []  Eating  []  Pain  []  Pt lethargic  [x]  Being transferred from [place] [address] to T  Will f/u later as patients' schedule allows. Thank you.   oRe Romero OTR/RONI

## 2018-01-26 NOTE — ROUTINE PROCESS
Bedside and Verbal shift change report given to GIA Cherry (oncoming nurse) by Issa Garrison RN (offgoing nurse). Report included the following information SBAR, Kardex, MAR and Recent Results. SITUATION:    Code Status: DNR   Reason for Admission: SBO (small bowel obstruction)   Abdominal pain   [facility]   Metast[facility] disease ([facility] [address] [facility]   Intractable abdominal pain   [address] [place] day: 1   Problem List:       Hospital Problems  Date Reviewed: 1/25/2018          Codes Class Noted POA    * (Principal)Bowel obstruction ICD-10-CM: T21.907  ICD-9-CM: 560.9  1/24/2018 Unknown        Metastatic disease ([facility] [address] ICD-10-CM: C79.9  ICD-9-CM: 199.1  1/24/2018 Unknown        Intractable abdominal pain ICD-10-CM: R10.9  ICD-9-CM: 789.00  1/24/2018 Unknown        Chemotherapy-induced neuropathy (HCC) (Chronic) ICD-10-CM: G62.0, T45.1X5A  ICD-9-CM: 357.6, E933.1  1/24/2018 Unknown        Alcohol abuse (Chronic) ICD-10-CM: F10.10  ICD-9-CM: 305.00  1/24/2018 Unknown        Bladder cancer ([facility] [address] ICD-10-CM: C67.9  ICD-9-CM: 188.9  8/7/2013 Unknown        Nicotine dependence ICD-10-CM: F17.200  ICD-9-CM: 305.1  3/27/2009 Yes              BACKGROUND:    Past Medical History:   Past Medical History:   Diagnosis Date    Acute sinusitis, unspecified     Alcohol abuse, unspecified     Bladder cancer ([facility] [address] 2013    chemo & radiation    Cough     Depressive disorder     Hematuria     HLD (hyperlipidemia)     Impotence of organic origin     Insomnia, unspecified     Other abnormal glucose     Other seborrheic keratosis     Pain in joint, shoulder region          Patient taking anticoagulants no     ASSESSMENT:    Changes in Assessment Throughout Shift: surg today     Patient has Central Line: no Reasons if yes: .  Patient has Patel Cath: no Reasons if yes: .       Last Vitals:     Vitals:    01/25/18 0356 01/25/18 0756 01/25/18 1220 01/25/18 1557   BP: 134/75 138/75 123/79 122/73   Pulse: 68 69 89 89 Resp: 19 18 18 18   Temp: 96.8 °F (36 °C) 96.7 °F (35.9 °C) 97.1 °F (36.2 °C) 97.1 °F (36.2 °C)   SpO2: 96% 98% 96% 97%   Weight:       Height:            IV and DRAINS (will only show if present)   Peripheral IV 01/24/18 Right Antecubital-Site Assessment: Clean, dry, & intact  Nasogastric Tube 01/24/18-Site Assessment: Clean, dry, & intact     WOUND (if present)   Wound Type:  none, in surg now   Dressing present Dressing Present : No   Wound Concerns/Notes:  none     PAIN    Pain Assessment    Pain Intensity 1: 8 (01/25/18 0848)    Pain Location 1: Abdomen         Patient Stated Pain Goal: 2  o Interventions for Pain:  IV PCA  o Intervention effective: no  o Time of last intervention: .   o Reassessment Completed: .  Last 3 Weights:  Last 3 Recorded Weights in this Encounter    01/24/18 2347   Weight: 64 kg (141 lb)     Weight change:      INTAKE/OUPUT    Current Shift:      Last three shifts: 01/24 0701 - 01/25 1900  In: 200 [I.V.:200]  Out: 0      LAB RESULTS     Recent Labs      01/25/18   0452  01/24/18   1545   WBC  5.7  7.8   HGB  15.4  16.8*   HCT  43.9  46.8   PLT  243  296        Recent Labs      01/25/18   0452  01/24/18   1545   NA  141  138   K  2.8*  3.6   GLU  97  142*   BUN  18  20*   CREA  0.65  0.99   CA  8.2*  9.6       RECOMMENDATIONS AND DISCHARGE PLANNING     1. Pending tests/procedures/ Plan of Care or Other Needs: .     2. Discharge plan for patient and Needs/Barriers: .    3. Estimated Discharge Date: . Posted on Whiteboard in [address] [place] [address] Room: .      4. The patient's care plan was reviewed with the oncoming nurse. \"HEALS\" SAFETY CHECK      Fall Risk    Total Score: 3    Safety Measures: Safety Measures: Bed/Chair-Wheels locked, Bed in low position, Call light within reach    A safety check occurred in the patient's room between off going nurse and oncoming nurse listed above.     The safety check included the below items  Area Items   H  High Alert Medications - Verify all high alert medication drips (heparin, PCA, etc.)   E  Equipment - Suction is set up for ALL patients (with tray)  - Red plugs utilized for all equipment (IV pumps, etc.)  - WOWs wiped down at end of shift.  - Room stocked with oxygen, suction, and other unit-specific supplies   A  Alarms - Bed alarm is set for fall risk patients  - Ensure chair alarm is in place and activated if patient is up in a chair   L  Lines - Check IV for any infiltration  - Patel bag is empty if patient has a Patel   - Tubing and IV bags are labeled   S  Safety   - Room is clean, patient is clean, and equipment is clean. - Hallways are clear from equipment besides carts. - Fall bracelet on for fall risk patients  - Ensure room is clear and free of clutter  - Suction is set up for ALL patients (with tray)  - Hallways are clear from equipment besides carts.    - Isolation precautions followed, supplies available outside room, sign posted     Laurel Gauthier RN

## 2018-01-26 NOTE — PROGRESS NOTES
Problem: Mobility Impaired (Adult and Pediatric)  Goal: *Acute Goals and Plan of Care (Insert Text)  Physical Therapy Goals  Initiated 1/25/2018 and to be accomplished within 7 day(s)  1. Patient will move from supine to sit and sit to supine, scoot up and down and roll side to side in bed with supervision/set-up. 2.  Patient will transfer from bed to chair and chair to bed with supervision/set-up using the least restrictive device. 3.  Patient will perform sit to stand with supervision/set-up. 4.  Patient will ambulate with supervision/set-up for >50 feet with the least restrictive device. 5.  Patient will ascend/descend 2 stairs with 1 handrail(s) with supervision/set-up. physical Therapy TREATMENT    Patient: George Gee (33 y.o. male)  Date: 1/26/2018  Diagnosis: SBO (small bowel obstruction)  Abdominal pain  Bladder cancer (HCC)  Metastatic disease (HCC)  Intractable abdominal pain  DX Bowel obstruction  Procedure(s) (LRB):  LOOP COLOSTOMY FROM DESCENDING COLON (N/A) 1 Day Post-Op  Precautions:  Fall  Chart, physical therapy assessment, plan of care and goals were reviewed. ASSESSMENT:  Patient presents today alert and agreeable to therapy and was supine in bed upon arrival. Patient with restraints off as family in room. Educated family to see nurse before leaving so they may re-apply restraints and not to leave patient alone in room unrestrained. Patient agreeable to transfer to EOB and due to confusion required additional cueing; in context patient able to perform bed mobility with CG assist. Patient sat EOB with intact balance and stood from bed with Min/ModA. Patient stood with B HHA to take 5ft of side steps towards HOB. Patient perseverating on need to urinate and was educated several times that he has catheter though patient demonstrates no carryover. Patient assisted back to sitting then to supine in bed with CG and scooted to center of bed.  Patient left resting with call bell by his side and family in room. Bed alarm activated. Progression toward goals:  [x]      Improving appropriately and progressing toward goals  []      Improving slowly and progressing toward goals  []      Not making progress toward goals and plan of care will be adjusted     PLAN:  Patient continues to benefit from skilled intervention to address the above impairments. Continue treatment per established plan of care. Discharge Recommendations:  Rafael Bryon  Further Equipment Recommendations for Discharge:  N/A     G-CODES:     Mobility  Current  CJ= 20-39%   Goal  CI= 1-19%. The severity rating is based on the Level of Assistance required for Functional Mobility and ADLs. SUBJECTIVE:   Patient stated I need to pee.  educated patient several times on fact that he has a catheter. OBJECTIVE DATA SUMMARY:   Critical Behavior:    A&Ox4  Functional Mobility Training:  Bed Mobility:  Sup <> sit: Melissa  Scooting: Melissa   Transfers:  Sit <> stand: Melissa    Balance:  Sitting: Intact  Standing: Impaired; With support  Standing - Static: Fair  Standing - Dynamic : Fair  Ambulation/Gait Training:  Distance (ft): 5 Feet (ft)   Ambulation - Level of Assistance: Minimal assistancex2   Gait Abnormalities: Decreased step clearance   Base of Support: Widened   Speed/Sudha: Slow   Interventions: Verbal cues; Visual/Demos  Pain:  Pt reports 0/10 pain or discomfort prior to treatment.    Pt reports 0/10 pain or discomfort post treatment. Activity Tolerance:   Patient tolerated activity well and was left with family in room. Please refer to the flowsheet for vital signs taken during this treatment.   After treatment:   [] Patient left in no apparent distress sitting up in chair  [x] Patient left in no apparent distress in bed  [x] Call bell left within reach  [x] Nursing notified  [] Caregiver present  [] Bed alarm activated      Nikhil Lopez, PT   Time Calculation: 19 mins

## 2018-01-26 NOTE — PROGRESS NOTES
Intern Progress Note  [facility]       Patient: Tami Muniz MRN: [id number]  CSN: [id number]    YOB: 1955  Age: 57 y.o. Sex: male    DOA: 1/24/2018 LOS:  LOS: 2 days                    Subjective:     Patient was seen post-op from loop colostomy from descending colon d/t large bowel obstruction. Patient was sleeping comfortably and deeply requiring more effort to arouse. Once patient was awake, he was initially confused but then after a few minutes returned to baseline. Patient reported that his pain was well controlled with PCA. Patient did not have any other issues or concerns and started to fall back to sleep. Review of Systems   Respiratory: Negative for shortness of breath. Cardiovascular: Negative for chest pain. Gastrointestinal: Positive for abdominal pain. Negative for nausea and vomiting. Genitourinary: Negative for dysuria, flank pain and hematuria. Neurological: Negative for dizziness and headaches.        Objective:      Patient Vitals for the past 24 hrs:   Temp Pulse Resp BP SpO2   01/26/18 0007 97.3 °F (36.3 °C) 67 18 136/80 97 %   01/25/18 2228 98.1 °F (36.7 °C) 85 18 155/81 97 %   01/25/18 2052 - 87 18 - 95 %   01/25/18 2049 - 100 16 - 94 %   01/25/18 2046 - 84 20 152/88 95 %   01/25/18 2042 - 77 15 - 96 %   01/25/18 2036 - 91 17 158/79 98 %   01/25/18 2026 - 81 16 159/80 98 %   01/25/18 2020 - 82 19 - 97 %   01/25/18 2016 - 79 18 159/79 97 %   01/25/18 2013 - 72 18 - 99 %   01/25/18 2010 - 78 12 164/75 97 %   01/25/18 2007 - 77 20 162/75 97 %   01/25/18 2003 - 76 19 - 97 %   01/25/18 1957 - 76 14 159/74 98 %   01/1953 - 76 20 - 98 %   01/25/18 1952 98 °F (36.7 °C) 75 19 170/78 100 %   01/25/18 1947 - 77 17 170/78 100 %   01/25/18 1946 - 77 16 - 100 %   01/25/18 1945 - - - - 99 %   01/25/18 1557 97.1 °F (36.2 °C) 89 18 122/73 97 %   01/25/18 1220 97.1 °F (36.2 °C) 89 18 123/79 96 %   01/25/18 0756 96.7 °F (35.9 °C) 69 18 138/75 98 % 01/25/18 0356 96.8 °F (36 °C) 68 19 134/75 96 %         Intake/Output Summary (Last 24 hours) at 01/26/18 0130  Last data filed at 01/26/18 0029   Gross per 24 hour   Intake              700 ml   Output              955 ml   Net             -255 ml         Physical Exam:   General:  Alert and Responsive and in No acute distress. HEENT: No cervical, supraclavicular, occipital or submandibular lymphadenopathy. CV:  RRR, no murmurs/rubs/gallops. RESP:  Unlabored breathing. Lungs clear to auscultation. No wheeze, rales, or rhonchi. Equal expansion bilaterally. ABD:  Soft, nontender, nondistended. No hepatosplenomegaly. No suprapubic tenderness. Left colostomy functioning with dark brown liquid stool output. MS:  No joint deformity or instability. No atrophy. Neuro:  5/5 strength bilateral upper extremities and lower extremities. Ext:  No edema. Skin:  No rashes, lesions, or ulcers. Assessment/Plan     57 y. o. male with PMH of malignant bladder cancer, urinary incontinence, T2DM, neuropathy, chronic constipation, nicotine dependence and alcohol abuse, now admitted with large bowel obstruction s/p ex-lap and loop colostomy from descending colon.     - Continue pain control via PCA Pump  - Continue LR @ 100 cc/hr, reassess daily  - Advance diet as tolerated  - Transfer to stepdown  - General surgery following, appreciate recs  - Continue management per primary day team      Peewee Galeana MD, PGY-1  [facility]  1/26/2018

## 2018-01-26 NOTE — ROUTINE PROCESS
Report called to [facility]; CVT stepdown. She reports that room is being cleaned and she will call me back when the room is ready.

## 2018-01-26 NOTE — ROUTINE PROCESS
Bedside and Verbal shift change report given to [facility] (oncoming nurse) by Geraldine Torres RN (offgoing nurse). Report included the following information SBAR, Kardex, MAR and Recent Results.     SITUATION:    Code Status: DNR   Reason for Admission: SBO (small bowel obstruction)   Abdominal pain   Bl[facility] ([facility] [address]   Metastatic disease ([facility] [address]   Intractable abdominal pain   [address] [place] day: 2   Problem List:       Hospital Problems  Date Reviewed: 1/25/2018          Codes Class Noted POA    * (Principal)Bowel obstruction ICD-10-CM: Y29.604  ICD-9-CM: 560.9  1/24/2018 Unknown        Metastatic disease ([facility] [address] ICD-10-CM: C79.9  ICD-9-CM: 199.1  1/24/2018 Unknown        Intractable abdominal pain ICD-10-CM: R10.9  ICD-9-CM: 789.00  1/24/2018 Unknown        Chemotherapy-induced neuropathy (HCC) (Chronic) ICD-10-CM: G62.0, T45.1X5A  ICD-9-CM: 357.6, E933.1  1/24/2018 Unknown        Alcohol abuse (Chronic) ICD-10-CM: F10.10  ICD-9-CM: 305.00  1/24/2018 Unknown        Bladder cancer ([facility] [address] ICD-10-CM: C67.9  ICD-9-CM: 188.9  8/7/2013 Unknown        Nicotine dependence ICD-10-CM: F17.200  ICD-9-CM: 305.1  3/27/2009 Yes              BACKGROUND:    Past Medical History:   Past Medical History:   Diagnosis Date    Acute sinusitis, unspecified     Alcohol abuse, unspecified     Bladder cancer ([facility] [address] 2013    chemo & radiation    Cough     Depressive disorder     Hematuria     HLD (hyperlipidemia)     Impotence of organic origin     Insomnia, unspecified     Other abnormal glucose     Other seborrheic keratosis     Pain in joint, shoulder region          Patient taking anticoagulants no     ASSESSMENT:    Changes in Assessment Throughout Shift: none     Patient has Central Line: no Reasons if yes:    Patient has Patel Cath: yes Reasons if yes: Surgical precedure      Last Vitals:     Vitals:    01/25/18 2049 01/25/18 2052 01/25/18 2228 01/26/18 0007   BP:   155/81 136/80   Pulse: 100 87 85 67 Resp: 16 18 18 18   Temp:   98.1 °F (36.7 °C) 97.3 °F (36.3 °C)   SpO2: 94% 95% 97% 97%   Weight:       Height:            IV and DRAINS (will only show if present)   Peripheral IV 01/25/18 Left Wrist-Site Assessment: Clean, dry, & intact  [REMOVED] Peripheral IV 01/24/18 Right Antecubital-Site Assessment: Clean, dry, & intact  Nasogastric Tube 01/24/18-Site Assessment: Clean, dry, & intact     WOUND (if present)   Wound Type:  none   Dressing present Dressing Present : No   Wound Concerns/Notes:  none     PAIN    Pain Assessment    Pain Intensity 1: 0 (01/25/18 2340)    Pain Location 1: Abdomen    Pain Intervention(s) 1: Medication (see MAR)    Patient Stated Pain Goal: 0  o Interventions for Pain:  PCA pump  o Intervention effective: yes  o Time of last intervention: See MAR   o Reassessment Completed: yes      Last 3 Weights:  Last 3 Recorded Weights in this Encounter    01/24/18 2347   Weight: 64 kg (141 lb)     Weight change:      INTAKE/OUPUT    Current Shift: 01/25 1901 - 01/26 0700  In: 500 [I.V.:500]  Out: 955 [Urine:335]    Last three shifts: 01/24 0701 - 01/25 1900  In: 200 [I.V.:200]  Out: 0      LAB RESULTS     Recent Labs      01/25/18   0452  01/24/18   1545   WBC  5.7  7.8   HGB  15.4  16.8*   HCT  43.9  46.8   PLT  243  296        Recent Labs      01/25/18   0452  01/24/18   1545   NA  141  138   K  2.8*  3.6   GLU  97  142*   BUN  18  20*   CREA  0.65  0.99   CA  8.2*  9.6       RECOMMENDATIONS AND DISCHARGE PLANNING     1. Pending tests/procedures/ Plan of Care or Other Needs: TBD     2. Discharge plan for patient and Needs/Barriers: TBD    3. Estimated Discharge Date: TBD Posted on Whiteboard in Patients Room: no      4. The patient's care plan was reviewed with the oncoming nurse.        \"HEALS\" SAFETY CHECK      Fall Risk    Total Score: 3    Safety Measures: Safety Measures: Bed/Chair-Wheels locked, Bed in low position, Call light within reach, Side rails X 3    A safety check occurred in the patient's room between off going nurse and oncoming nurse listed above. The safety check included the below items  Area Items   H  High Alert Medications - Verify all high alert medication drips (heparin, PCA, etc.)   E  Equipment - Suction is set up for ALL patients (with tray)  - Red plugs utilized for all equipment (IV pumps, etc.)  - WOWs wiped down at end of shift.  - Room stocked with oxygen, suction, and other unit-specific supplies   A  Alarms - Bed alarm is set for fall risk patients  - Ensure chair alarm is in place and activated if patient is up in a chair   L  Lines - Check IV for any infiltration  - Patel bag is empty if patient has a Patel   - Tubing and IV bags are labeled   S  Safety   - Room is clean, patient is clean, and equipment is clean. - Hallways are clear from equipment besides carts. - Fall bracelet on for fall risk patients  - Ensure room is clear and free of clutter  - Suction is set up for ALL patients (with tray)  - Hallways are clear from equipment besides carts.    - Isolation precautions followed, supplies available outside room, sign posted     Harman Baeza RN

## 2018-01-26 NOTE — BRIEF OP NOTE
BRIEF OPERATIVE NOTE    Date of Procedure: 1/25/2018   Preoperative Diagnosis: DX Rectosigmoid stenosis  Postoperative Diagnosis: Rectosigmoid stenosis    Procedure(s):  LOOP COLOSTOMY FROM DESCENDING COLON  Surgeon(s) and Role:     * Zena Dias MD - Primary         Assistant Staff: None      Surgical Staff:  Circ-1: Clemon Romberg, RN; Margarito Canada RN  Scrub Tech-1: Clydene Coup  Surg Asst-1: Joya Chol  Surg Asst-Relief: Gilford Crest  Event Time In   Incision Start 1831   Incision Close 1936     Anesthesia: General   Estimated Blood Loss: 20 mL  Specimens: * No specimens in log *   Findings: functioning loop descending colostomy   Complications: None  Implants: * No implants in log *   Job ID:  [id number]

## 2018-01-26 NOTE — PROGRESS NOTES
Intern Progress Note  [facility]       Patient: Frandy Cobb MRN: [id number]  CSN: [id number]    YOB: 1955  Age: 57 y.o. Sex: male    DOA: 1/24/2018 LOS:  LOS: 2 days                    Subjective:     Acute events: Pt in bed, very confused, agitated, pulling at adhesives on skin. Pain well controlled with medication. Ostomy site open, about to be replaced by nurse d/t patient pulling out. Pt in soft restraints but able to reach lines. NG tube not replaced yet. Pt pulled out this AM.     Review of Systems   Respiratory: Negative for shortness of breath. Cardiovascular: Negative for chest pain and palpitations. Gastrointestinal: Negative for abdominal pain, nausea and vomiting. Neurological: Negative for dizziness and headaches.        Objective:      Patient Vitals for the past 24 hrs:   Temp Pulse Resp BP SpO2   01/26/18 0810 97.7 °F (36.5 °C) 88 25 148/82 98 %   01/26/18 0729 97.5 °F (36.4 °C) 67 22 135/67 95 %   01/26/18 0432 96.9 °F (36.1 °C) 89 20 113/57 100 %   01/26/18 0007 97.3 °F (36.3 °C) 67 18 136/80 97 %   01/25/18 2228 98.1 °F (36.7 °C) 85 18 155/81 97 %   01/25/18 2052 - 87 18 - 95 %   01/25/18 2049 - 100 16 - 94 %   01/25/18 2046 - 84 20 152/88 95 %   01/25/18 2042 - 77 15 - 96 %   01/25/18 2036 - 91 17 158/79 98 %   01/25/18 2026 - 81 16 159/80 98 %   01/25/18 2020 - 82 19 - 97 %   01/25/18 2016 - 79 18 159/79 97 %   01/25/18 2013 - 72 18 - 99 %   01/25/18 2010 - 78 12 164/75 97 %   01/25/18 2007 - 77 20 162/75 97 %   01/25/18 2003 - 76 19 - 97 %   01/25/18 1957 - 76 14 159/74 98 %   01/1953 - 76 20 - 98 %   01/25/18 1952 98 °F (36.7 °C) 75 19 170/78 100 %   01/25/18 1947 - 77 17 170/78 100 %   01/25/18 1946 - 77 16 - 100 %   01/25/18 1945 - - - - 99 %   01/25/18 1557 97.1 °F (36.2 °C) 89 18 122/73 97 %   01/25/18 1220 97.1 °F (36.2 °C) 89 18 123/79 96 %         Intake/Output Summary (Last 24 hours) at 01/26/18 1001  Last data filed at 01/26/18 0525   Gross per 24 hour   Intake              700 ml   Output             2135 ml   Net            -1435 ml       Physical Exam   Constitutional: He appears cachectic. He is cooperative. No distress. Eyes: EOM are normal.   Neck: Normal range of motion. Cardiovascular: Normal rate, regular rhythm, normal heart sounds and intact distal pulses. Pulmonary/Chest: Effort normal and breath sounds normal.   Abdominal: Soft. Bowel sounds are normal. He exhibits no distension. There is no tenderness. Ostomy site open, about to be replaced   Neurological: He is alert. Skin: Skin is warm and dry. Psychiatric: He is agitated. He expresses impulsivity. Lab/Data Reviewed:  Recent Results (from the past 24 hour(s))   POC 6 PLUS    Collection Time: 01/25/18  5:29 PM   Result Value Ref Range    Sodium,  136 - 145 MMOL/L    Potassium, POC 3.9 3.5 - 5.5 MMOL/L    Chloride,  (H) 100 - 108 MMOL/L    BUN, POC 21 (H) 7 - 18 MG/DL    Glucose,  (H) 74 - 106 MG/DL    Hematocrit, POC 41 36 - 49 %    Hemoglobin, POC 13.9 12 - 16 G/DL   CBC WITH AUTOMATED DIFF    Collection Time: 01/26/18  4:25 AM   Result Value Ref Range    WBC 10.5 4.6 - 13.2 K/uL    RBC 4.46 (L) 4.70 - 5.50 M/uL    HGB 14.8 13.0 - 16.0 g/dL    HCT 43.2 36.0 - 48.0 %    MCV 96.9 74.0 - 97.0 FL    MCH 33.2 24.0 - 34.0 PG    MCHC 34.3 31.0 - 37.0 g/dL    RDW 12.9 11.6 - 14.5 %    PLATELET 311 508 - 539 K/uL    MPV 9.9 9.2 - 11.8 FL    NEUTROPHILS 87 (H) 40 - 73 %    LYMPHOCYTES 5 (L) 21 - 52 %    MONOCYTES 8 3 - 10 %    EOSINOPHILS 0 0 - 5 %    BASOPHILS 0 0 - 2 %    ABS. NEUTROPHILS 9.1 (H) 1.8 - 8.0 K/UL    ABS. LYMPHOCYTES 0.6 (L) 0.9 - 3.6 K/UL    ABS. MONOCYTES 0.8 0.05 - 1.2 K/UL    ABS. EOSINOPHILS 0.0 0.0 - 0.4 K/UL    ABS.  BASOPHILS 0.0 0.0 - 0.1 K/UL    DF AUTOMATED     METABOLIC PANEL, BASIC    Collection Time: 01/26/18  7:36 AM   Result Value Ref Range    Sodium 150 (H) 136 - 145 mmol/L    Potassium 3.1 (L) 3.5 - 5.5 mmol/L Chloride 111 (H) 100 - 108 mmol/L    CO2 33 (H) 21 - 32 mmol/L    Anion gap 6 3.0 - 18 mmol/L    Glucose 130 (H) 74 - 99 mg/dL    BUN 23 (H) 7.0 - 18 MG/DL    Creatinine 0.72 0.6 - 1.3 MG/DL    BUN/Creatinine ratio 32 (H) 12 - 20      GFR est AA >60 >60 ml/min/1.73m2    GFR est non-AA >60 >60 ml/min/1.73m2    Calcium 8.1 (L) 8.5 - 10.1 MG/DL        Scheduled Medications Reviewed:  Current Facility-Administered Medications   Medication Dose Route Frequency    potassium chloride 10 mEq, lidocaine (PF) (XYLOCAINE) 10 mg/mL (1 %) 1 mL in 0.9% sodium chloride 100 mL IVPB   IntraVENous Q1H    HYDROmorphone (PF) (DILAUDID) 30 mg / 30 mL PCA   IntraVENous TITRATE    lactated Ringers infusion  100 mL/hr IntraVENous CONTINUOUS    ketorolac (TORADOL) injection 15 mg  15 mg IntraVENous Q8H    ampicillin-sulbactam (UNASYN) 3 g in 0.9% sodium chloride (MBP/ADV) 100 mL MBP  3 g IntraVENous Q6H    enoxaparin (LOVENOX) injection 40 mg  40 mg SubCUTAneous Q24H    nicotine (NICODERM CQ) 14 mg/24 hr patch 1 Patch  1 Patch TransDERmal DAILY    sodium chloride (NS) flush 5-10 mL  5-10 mL IntraVENous Q8H         Imaging, microbiology, and EKG/Telemetry:  Gastrograffin Enema: Progressive long segment narrowing of the rectum causes upstream large and small bowel obstruction, which is concordant with findings on recent CT and colonoscopy. Differential considerations remain segmental narrowing related to radiation fibrosis, other chronic inflammation, primary malignancy or metastatic disease. Assessment/Plan     57 y. o. male with PMH of malignant bladder cancer, urinary incontinence, T2DM, neuropathy, chronic constipation, nicotine dependence and alcohol abuse, now admitted with large bowel obstruction.      Large Bowel Obstruction 2/2 metastasis vs post-radiatio changes vs stricture: Followed by Dr. Donaldo Christina (GI) as an outpatient.  Patient has a h/o metatastic bladder cancer.  - [facility]   - General surgery consulted, POD 1 s/p loop colostomy  - IVF: D5 1/2 NS @ 100 cc/hr, reassess daily  - NPO for bowel rest  - NG  - Pain control: Dilaudid PCA pump, titrate appropriately  - IV phenergan 25 mg q6h PRN for nausea/vomiting  - Monitor VS, per unit routine      Malignant Bladder Cancer/Urinary Incontinence: Patient is followed by Dr. Satinder Brown (Urology), Dr. Carin Connelly (Heme-Onc), & Dr. Payal Leslie (Radiation Onc) as outpatient. - Consulted Heme-Onc- appreciate recs  - Consulted Palliative Care- appreciate recs  - Consulted Urology- appreciate recs   - Hold home oxybutynin XL 10 mg daily while NPO    Hypokalemia: K 23.6>2.8>3.1  - 40mEq KCl IV given over 4 hours  - Monitor BMP, replete as necessary    Hypernatremia: Na: 150  - Switch IVF to D5 1/2 NS @ 100mL/hr      Chemotherapy-induced Neuropathy:  - Hold home gabapentin 600 mg TID while NPO      Chronic Constipation: Colonoscopy (1/08/18) showed congestion and scarring in the rectum and large load of fecal impaction noted proximal to area of stricture. - s/p loop colostomy      Nicotine Dependence/Alcohol Abuse: Patient has a 43 pack year history and currently smokes about 1 ppd.  Patient has a history of alcohol abuse with current questionable consumption.  - Nicotine 14 mg/24 hr patch  - [facility] Protocol  - Encourage smoking cessation + alcohol abstinence      Diet: NPO  DVT Prophylaxis: Lovenox  Code Status: DNR  Point of Contact: Sae Simms, [phone], Sister   Elijah Stewart, [phone], Brother-in-Law      Disposition and anticipated LOS: >2 midnights    Justen Min MD, PGY-1  [facility]  01/26/18 10:01 AM

## 2018-01-26 NOTE — PERIOP NOTES
Patient arrived to PACU in severe pain. Writhing about in bed, calling for someone named Joan Gonzalez. CRNA stated that IV right AC was leaking. New IV inserted left hand and the IV on right removed. After multimodal analgesia patient finally resting more comfortably. Rates pain 6. He will be restarted on IV PCA dilaudid when he gets back to his room.

## 2018-01-26 NOTE — ROUTINE PROCESS
TRANSFER - OUT REPORT:    Verbal report given to [address] [place] [address] on George Gee  being transferred to room  514 for routine progression of care       Report consisted of patients Situation, Background, Assessment and   Recommendations(SBAR). Information from the following report(s) SBAR, OR Summary, Intake/Output and MAR was reviewed with the receiving nurse. Opportunity for questions and clarification was provided.       Patient transported with:   O2 @ 2 liters  Registered Nurse

## 2018-01-26 NOTE — ROUTINE PROCESS
Assumed care of patient. He confused and pulls at essential line and tubes and attempts to get OOB without asst..  Restraints intact; soft wrist.

## 2018-01-26 NOTE — ANESTHESIA POSTPROCEDURE EVALUATION
Post-Anesthesia Evaluation and Assessment    Patient: Les Mckee MRN: [id number]  SSN: [id number]    YOB: 1955  Age: 57 y.o. Sex: male       Cardiovascular Function/Vital Signs  Visit Vitals    /88    Pulse 87    Temp 36.7 °C (98 °F)    Resp 18    Ht 5' 9\" (1.753 m)    Wt 64 kg (141 lb)    SpO2 95%    BMI 20.82 kg/m2       Patient is status post general anesthesia for Procedure(s):  LOOP COLOSTOMY FROM DESCENDING COLON. Nausea/Vomiting: None    Postoperative hydration reviewed and adequate. Pain:  Pain Scale 1: Numeric (0 - 10) (01/25/18 2052)  Pain Intensity 1: 6 (01/25/18 2052)   Managed    Neurological Status:   Neuro (WDL): Within Defined Limits (01/25/18 1952)   At baseline    Mental Status and Level of Consciousness: Arousable    Pulmonary Status:   O2 Device: Nasal cannula (01/25/18 2010)   Adequate oxygenation and airway patent    Complications related to anesthesia: None    Post-anesthesia assessment completed.  No concerns    Signed By: Sal Solano CRNA     January 25, 2018

## 2018-01-27 LAB
ANION GAP SERPL CALC-SCNC: 5 MMOL/L (ref 3–18)
BASOPHILS # BLD: 0 K/UL (ref 0–0.1)
BASOPHILS NFR BLD: 0 % (ref 0–2)
BUN SERPL-MCNC: 15 MG/DL (ref 7–18)
BUN/CREAT SERPL: 25 (ref 12–20)
CALCIUM SERPL-MCNC: 8 MG/DL (ref 8.5–10.1)
CHLORIDE SERPL-SCNC: 115 MMOL/L (ref 100–108)
CO2 SERPL-SCNC: 28 MMOL/L (ref 21–32)
CREAT SERPL-MCNC: 0.59 MG/DL (ref 0.6–1.3)
DIFFERENTIAL METHOD BLD: ABNORMAL
EOSINOPHIL # BLD: 0.3 K/UL (ref 0–0.4)
EOSINOPHIL NFR BLD: 4 % (ref 0–5)
ERYTHROCYTE [DISTWIDTH] IN BLOOD BY AUTOMATED COUNT: 13 % (ref 11.6–14.5)
GLUCOSE SERPL-MCNC: 97 MG/DL (ref 74–99)
HCT VFR BLD AUTO: 40.4 % (ref 36–48)
HGB BLD-MCNC: 13.8 G/DL (ref 13–16)
INR PPP: 1.2 (ref 0.8–1.2)
LYMPHOCYTES # BLD: 1.1 K/UL (ref 0.9–3.6)
LYMPHOCYTES NFR BLD: 15 % (ref 21–52)
MAGNESIUM SERPL-MCNC: 1.7 MG/DL (ref 1.6–2.6)
MAGNESIUM SERPL-MCNC: 1.9 MG/DL (ref 1.6–2.6)
MCH RBC QN AUTO: 33 PG (ref 24–34)
MCHC RBC AUTO-ENTMCNC: 34.2 G/DL (ref 31–37)
MCV RBC AUTO: 96.7 FL (ref 74–97)
MONOCYTES # BLD: 0.6 K/UL (ref 0.05–1.2)
MONOCYTES NFR BLD: 9 % (ref 3–10)
NEUTS SEG # BLD: 4.9 K/UL (ref 1.8–8)
NEUTS SEG NFR BLD: 72 % (ref 40–73)
PLATELET # BLD AUTO: 220 K/UL (ref 135–420)
PMV BLD AUTO: 9.9 FL (ref 9.2–11.8)
POTASSIUM SERPL-SCNC: 3 MMOL/L (ref 3.5–5.5)
POTASSIUM SERPL-SCNC: 3.3 MMOL/L (ref 3.5–5.5)
POTASSIUM SERPL-SCNC: 4.1 MMOL/L (ref 3.5–5.5)
PROTHROMBIN TIME: 14.5 SEC (ref 11.5–15.2)
RBC # BLD AUTO: 4.18 M/UL (ref 4.7–5.5)
SODIUM SERPL-SCNC: 148 MMOL/L (ref 136–145)
WBC # BLD AUTO: 6.9 K/UL (ref 4.6–13.2)

## 2018-01-27 PROCEDURE — 84132 ASSAY OF SERUM POTASSIUM: CPT

## 2018-01-27 PROCEDURE — 36415 COLL VENOUS BLD VENIPUNCTURE: CPT | Performed by: SURGERY

## 2018-01-27 PROCEDURE — 80048 BASIC METABOLIC PNL TOTAL CA: CPT | Performed by: SURGERY

## 2018-01-27 PROCEDURE — 74011250636 HC RX REV CODE- 250/636: Performed by: EMERGENCY MEDICINE

## 2018-01-27 PROCEDURE — 74011250636 HC RX REV CODE- 250/636: Performed by: SURGERY

## 2018-01-27 PROCEDURE — 74011250636 HC RX REV CODE- 250/636: Performed by: FAMILY MEDICINE

## 2018-01-27 PROCEDURE — 65660000004 HC RM CVT STEPDOWN

## 2018-01-27 PROCEDURE — 74011000258 HC RX REV CODE- 258: Performed by: SURGERY

## 2018-01-27 PROCEDURE — 74011000258 HC RX REV CODE- 258: Performed by: STUDENT IN AN ORGANIZED HEALTH CARE EDUCATION/TRAINING PROGRAM

## 2018-01-27 PROCEDURE — 74011000250 HC RX REV CODE- 250: Performed by: EMERGENCY MEDICINE

## 2018-01-27 PROCEDURE — 74011000250 HC RX REV CODE- 250: Performed by: FAMILY MEDICINE

## 2018-01-27 PROCEDURE — 74011000258 HC RX REV CODE- 258: Performed by: FAMILY MEDICINE

## 2018-01-27 PROCEDURE — 85025 COMPLETE CBC W/AUTO DIFF WBC: CPT | Performed by: SURGERY

## 2018-01-27 PROCEDURE — 83735 ASSAY OF MAGNESIUM: CPT

## 2018-01-27 PROCEDURE — 74011250637 HC RX REV CODE- 250/637: Performed by: STUDENT IN AN ORGANIZED HEALTH CARE EDUCATION/TRAINING PROGRAM

## 2018-01-27 PROCEDURE — 83735 ASSAY OF MAGNESIUM: CPT | Performed by: SURGERY

## 2018-01-27 PROCEDURE — 74011000258 HC RX REV CODE- 258: Performed by: EMERGENCY MEDICINE

## 2018-01-27 PROCEDURE — 74011000250 HC RX REV CODE- 250: Performed by: STUDENT IN AN ORGANIZED HEALTH CARE EDUCATION/TRAINING PROGRAM

## 2018-01-27 PROCEDURE — 74011250636 HC RX REV CODE- 250/636: Performed by: STUDENT IN AN ORGANIZED HEALTH CARE EDUCATION/TRAINING PROGRAM

## 2018-01-27 PROCEDURE — 85610 PROTHROMBIN TIME: CPT | Performed by: SURGERY

## 2018-01-27 RX ORDER — HYDROMORPHONE HYDROCHLORIDE 2 MG/ML
1 INJECTION, SOLUTION INTRAMUSCULAR; INTRAVENOUS; SUBCUTANEOUS
Status: DISCONTINUED | OUTPATIENT
Start: 2018-01-27 | End: 2018-01-28

## 2018-01-27 RX ORDER — LORAZEPAM 2 MG/ML
1 INJECTION INTRAMUSCULAR
Status: DISCONTINUED | OUTPATIENT
Start: 2018-01-27 | End: 2018-01-28

## 2018-01-27 RX ADMIN — Medication 10 ML: at 20:41

## 2018-01-27 RX ADMIN — Medication 10 ML: at 21:36

## 2018-01-27 RX ADMIN — LIDOCAINE HYDROCHLORIDE: 10 INJECTION, SOLUTION EPIDURAL; INFILTRATION; INTRACAUDAL; PERINEURAL at 11:54

## 2018-01-27 RX ADMIN — Medication 10 ML: at 06:00

## 2018-01-27 RX ADMIN — LIDOCAINE HYDROCHLORIDE: 10 INJECTION, SOLUTION EPIDURAL; INFILTRATION; INTRACAUDAL; PERINEURAL at 21:00

## 2018-01-27 RX ADMIN — LIDOCAINE HYDROCHLORIDE: 10 INJECTION, SOLUTION EPIDURAL; INFILTRATION; INTRACAUDAL; PERINEURAL at 20:37

## 2018-01-27 RX ADMIN — KETOROLAC TROMETHAMINE 15 MG: 30 INJECTION, SOLUTION INTRAMUSCULAR at 05:00

## 2018-01-27 RX ADMIN — PROMETHAZINE HYDROCHLORIDE 25 MG: 25 INJECTION INTRAMUSCULAR; INTRAVENOUS at 05:02

## 2018-01-27 RX ADMIN — HYDROMORPHONE HYDROCHLORIDE 1 MG: 2 INJECTION, SOLUTION INTRAMUSCULAR; INTRAVENOUS; SUBCUTANEOUS at 20:24

## 2018-01-27 RX ADMIN — LIDOCAINE HYDROCHLORIDE: 10 INJECTION, SOLUTION EPIDURAL; INFILTRATION; INTRACAUDAL; PERINEURAL at 17:36

## 2018-01-27 RX ADMIN — LORAZEPAM 1 MG: 2 INJECTION INTRAMUSCULAR; INTRAVENOUS at 20:19

## 2018-01-27 RX ADMIN — AMPICILLIN AND SULBACTAM 3 G: 1; 2 INJECTION, POWDER, FOR SOLUTION INTRAMUSCULAR; INTRAVENOUS at 01:51

## 2018-01-27 RX ADMIN — AMPICILLIN AND SULBACTAM 3 G: 1; 2 INJECTION, POWDER, FOR SOLUTION INTRAMUSCULAR; INTRAVENOUS at 13:22

## 2018-01-27 RX ADMIN — LIDOCAINE HYDROCHLORIDE: 10 INJECTION, SOLUTION EPIDURAL; INFILTRATION; INTRACAUDAL; PERINEURAL at 14:50

## 2018-01-27 RX ADMIN — LORAZEPAM 1 MG: 2 INJECTION INTRAMUSCULAR; INTRAVENOUS at 02:11

## 2018-01-27 RX ADMIN — KETOROLAC TROMETHAMINE 15 MG: 30 INJECTION, SOLUTION INTRAMUSCULAR at 21:34

## 2018-01-27 RX ADMIN — LIDOCAINE HYDROCHLORIDE: 10 INJECTION, SOLUTION EPIDURAL; INFILTRATION; INTRACAUDAL; PERINEURAL at 18:48

## 2018-01-27 RX ADMIN — ENOXAPARIN SODIUM 40 MG: 40 INJECTION SUBCUTANEOUS at 20:27

## 2018-01-27 RX ADMIN — LIDOCAINE HYDROCHLORIDE: 10 INJECTION, SOLUTION EPIDURAL; INFILTRATION; INTRACAUDAL; PERINEURAL at 18:10

## 2018-01-27 RX ADMIN — LORAZEPAM 1 MG: 2 INJECTION INTRAMUSCULAR; INTRAVENOUS at 14:19

## 2018-01-27 RX ADMIN — KETOROLAC TROMETHAMINE 15 MG: 30 INJECTION, SOLUTION INTRAMUSCULAR at 13:23

## 2018-01-27 RX ADMIN — THIAMINE HYDROCHLORIDE 50 MG: 100 INJECTION, SOLUTION INTRAMUSCULAR; INTRAVENOUS at 13:56

## 2018-01-27 RX ADMIN — AMPICILLIN AND SULBACTAM 3 G: 1; 2 INJECTION, POWDER, FOR SOLUTION INTRAMUSCULAR; INTRAVENOUS at 08:41

## 2018-01-27 RX ADMIN — FAMOTIDINE 20 MG: 10 INJECTION INTRAVENOUS at 08:45

## 2018-01-27 RX ADMIN — AMPICILLIN AND SULBACTAM 3 G: 1; 2 INJECTION, POWDER, FOR SOLUTION INTRAMUSCULAR; INTRAVENOUS at 20:33

## 2018-01-27 NOTE — PROGRESS NOTES
Intern Progress Note  [facility]       Patient: Maddy Robles MRN: [id number]  CSN: [id number]    YOB: 1955  Age: 57 y.o.   Sex: male    DOA: 1/24/2018 LOS:  LOS: 3 days                    Subjective:     Acute events:   - POD #2 S/P Loop colostomy from descending colon   - Pt became very confused, agitated yesterday morning, requiring restraints   - Restraints in place yesterday and overnight     - Per RN, pt not using PCA pump correctly or at all, pt appears to be in significant pain   - Colostomy began leaking liquid stool sometime in the night; pt seen at around 22:00 and not producing any stool at that time   - At approx 5 am, pt became acutely confused, very agitated, despite wrist restraints was able to get 1 foot on ground, stand up and tried to break from restraints while arms, torso and legs covered in liquid stool, was screaming out, not aware of person, place or time   - RN administered bolus of pt's PCA Dilaudid 1 mg, Ativan 1 mg, and Toradol 50 mg, pt then calmed down and has been resting comfortably since     - Pt resting comfortably in bed, although easily arousable to stimuli  - when aroused, pt confused, not aware to person, place or time, groans out in pain   - Colostomy site is leaking significant amount of liquid stool which is running down abdomen   - Surgical site under colostomy ring is acutely erythematous, very tender to palpation        Review of Systems   Unable to perform ROS: Mental acuity (Pt is delious, confused as to person, place and time)       Objective:      Patient Vitals for the past 24 hrs:   Temp Pulse Resp BP SpO2   01/27/18 0715 97.3 °F (36.3 °C) 64 20 111/62 94 %   01/27/18 0400 98.3 °F (36.8 °C) 93 18 141/82 93 %   01/27/18 0000 97.6 °F (36.4 °C) 82 18 125/72 93 %   01/26/18 1930 97.5 °F (36.4 °C) 93 20 149/74 97 %   01/26/18 1710 98 °F (36.7 °C) 79 20 122/63 98 %   01/26/18 1200 97.8 °F (36.6 °C) 70 20 149/70 95 % Intake/Output Summary (Last 24 hours) at 01/27/18 0830  Last data filed at 01/27/18 0640   Gross per 24 hour   Intake          2516.67 ml   Output             1730 ml   Net           786.67 ml       Physical Exam   Constitutional: He appears cachectic. He is uncooperative. He is easily aroused. When aroused, pt groans in pain, is not aware of person, place or time    Eyes: EOM are normal.   Neck: Normal range of motion. Cardiovascular: Normal rate, regular rhythm, normal heart sounds and intact distal pulses. Pulmonary/Chest: Effort normal and breath sounds normal.   Abdominal: Soft. Bowel sounds are normal. He exhibits no distension. There is tenderness. Colostomy in place, leaking liquid stool around site; L and inferior surgical site is erythematous, very tender  No masses, but abdomen and mostly suprapubic area are significantly tender   Neurological: He is easily aroused. Skin: Skin is warm and dry.    Psychiatric:   Agitated and combative when aroused        Lab/Data Reviewed:  Recent Results (from the past 24 hour(s))   METABOLIC PANEL, BASIC    Collection Time: 01/26/18  3:40 PM   Result Value Ref Range    Sodium 148 (H) 136 - 145 mmol/L    Potassium 3.3 (L) 3.5 - 5.5 mmol/L    Chloride 112 (H) 100 - 108 mmol/L    CO2 34 (H) 21 - 32 mmol/L    Anion gap 2 (L) 3.0 - 18 mmol/L    Glucose 107 (H) 74 - 99 mg/dL    BUN 20 (H) 7.0 - 18 MG/DL    Creatinine 0.55 (L) 0.6 - 1.3 MG/DL    BUN/Creatinine ratio 36 (H) 12 - 20      GFR est AA >60 >60 ml/min/1.73m2    GFR est non-AA >60 >60 ml/min/1.73m2    Calcium 8.5 8.5 - 10.1 MG/DL   CBC WITH AUTOMATED DIFF    Collection Time: 01/27/18  3:07 AM   Result Value Ref Range    WBC 6.9 4.6 - 13.2 K/uL    RBC 4.18 (L) 4.70 - 5.50 M/uL    HGB 13.8 13.0 - 16.0 g/dL    HCT 40.4 36.0 - 48.0 %    MCV 96.7 74.0 - 97.0 FL    MCH 33.0 24.0 - 34.0 PG    MCHC 34.2 31.0 - 37.0 g/dL    RDW 13.0 11.6 - 14.5 %    PLATELET 113 989 - 665 K/uL    MPV 9.9 9.2 - 11.8 FL NEUTROPHILS 72 40 - 73 %    LYMPHOCYTES 15 (L) 21 - 52 %    MONOCYTES 9 3 - 10 %    EOSINOPHILS 4 0 - 5 %    BASOPHILS 0 0 - 2 %    ABS. NEUTROPHILS 4.9 1.8 - 8.0 K/UL    ABS. LYMPHOCYTES 1.1 0.9 - 3.6 K/UL    ABS. MONOCYTES 0.6 0.05 - 1.2 K/UL    ABS. EOSINOPHILS 0.3 0.0 - 0.4 K/UL    ABS.  BASOPHILS 0.0 0.0 - 0.1 K/UL    DF AUTOMATED     METABOLIC PANEL, BASIC    Collection Time: 01/27/18  3:07 AM   Result Value Ref Range    Sodium 148 (H) 136 - 145 mmol/L    Potassium 3.3 (L) 3.5 - 5.5 mmol/L    Chloride 115 (H) 100 - 108 mmol/L    CO2 28 21 - 32 mmol/L    Anion gap 5 3.0 - 18 mmol/L    Glucose 97 74 - 99 mg/dL    BUN 15 7.0 - 18 MG/DL    Creatinine 0.59 (L) 0.6 - 1.3 MG/DL    BUN/Creatinine ratio 25 (H) 12 - 20      GFR est AA >60 >60 ml/min/1.73m2    GFR est non-AA >60 >60 ml/min/1.73m2    Calcium 8.0 (L) 8.5 - 10.1 MG/DL   PROTHROMBIN TIME + INR    Collection Time: 01/27/18  3:07 AM   Result Value Ref Range    Prothrombin time 14.5 11.5 - 15.2 sec    INR 1.2 0.8 - 1.2     MAGNESIUM    Collection Time: 01/27/18  3:07 AM   Result Value Ref Range    Magnesium 1.9 1.6 - 2.6 mg/dL        Scheduled Medications Reviewed:  Current Facility-Administered Medications   Medication Dose Route Frequency    potassium chloride 10 mEq, lidocaine (PF) (XYLOCAINE) 10 mg/mL (1 %) 1 mL in 0.9% sodium chloride 100 mL IVPB   IntraVENous Q1H    dextrose 5 % - 0.45% NaCl infusion  100 mL/hr IntraVENous CONTINUOUS    famotidine (PF) (PEPCID) injection 20 mg  20 mg IntraVENous DAILY    thiamine (B-1) 50 mg in 0.9% sodium chloride 50 mL IVPB  50 mg IntraVENous DAILY    HYDROmorphone (PF) (DILAUDID) 30 mg / 30 mL PCA   IntraVENous TITRATE    ketorolac (TORADOL) injection 15 mg  15 mg IntraVENous Q8H    ampicillin-sulbactam (UNASYN) 3 g in 0.9% sodium chloride (MBP/ADV) 100 mL MBP  3 g IntraVENous Q6H    enoxaparin (LOVENOX) injection 40 mg  40 mg SubCUTAneous Q24H    nicotine (NICODERM CQ) 14 mg/24 hr patch 1 Patch  1 Patch TransDERmal DAILY    sodium chloride (NS) flush 5-10 mL  5-10 mL IntraVENous Q8H         Imaging, microbiology, and EKG/Telemetry:  Gastrograffin Enema: Progressive long segment narrowing of the rectum causes upstream large and small bowel obstruction, which is concordant with findings on recent CT and colonoscopy. Differential considerations remain segmental narrowing related to radiation fibrosis, other chronic inflammation, primary malignancy or metastatic disease. Assessment/Plan     Melani Verma is a 56 y/o male with PMHx of bladder CA with metastases to the lungs and spine, bowel obstruction, and HLD who is now admitted with bowel obstruction, and for intractable pain, nausea and vomiting. He is post-op day 2 from loop colostomy. S/P Loop Colostomy for Bowel Obstruction 2/2 metastasis vs post-radiatio changes vs stricture-  Patient has a h/o metatastic bladder cancer. Dr. Cain Garrett performed loop colostomy without problems on 1/25/18.  Pt producing liquid stool now, stool is leaking around colostomy site; RN reports pt not using PCA properly, is too confused to push button, doesn't understand he can push button for pain relief; RN and pt's sister have been pushing it for him; Concern for superficial surgical site infection; pt is afebrile, WBC's wnl, but superficial surgical site is erythematous and very tender, pt is covered with Unasyn; surgery to manage   - General surgery following, POD #2 s/p loop colostomy  - Surgery to manage leaking colostomy site   - Continue D5 1/2 NS @ 100 cc/hr, reassess daily  - Dilaudid PCA pump for pain control  - Day team to assess pt, will consider increasing pt's basal rate to 0.4 mg/hr, increasing pt bolus dose to 0.2 mg   - IV phenergan 25 mg q6h PRN for nausea/vomiting  - Diet per surgery  - Concern for superficial surgical site infection; pt is covered with Unasyn; surgery to manage     Acute Delirium/Alcohol Withdrawal   - CIWA Protocol discontinued due to oversedation   - Continue Ativan 1 mg q4hrs PRN   - Continue restraints, reassess q12 hrs       Malignant Bladder Cancer/Urinary Incontinence- Followed by Dr. Bindu Zambrano (Urology), Dr. Merissa Cuadra (Heme-Onc), & Dr. Juancarlos Hsu (Radiation Onc) as outpatient. - Heme-Onc consulted   - Urology consulted   - Palliative Care consulted   - Hold home oxybutynin XL 10 mg daily while NPO  - Once medically stable, acute delirium improves, Pt to go home on hospice; see hospice note from 1/25      Hypokalemia  - K 3.6>2.8>3.1>3.3  - Monitor BMP, replete as necessary    Hypernatremia  - Na 150>148  - Continue D5 1/2 NS @ 100mL/hr      Chemotherapy-induced Neuropathy  - Hold home gabapentin 600 mg TID while NPO      Chronic Constipation: Colonoscopy (1/08/18) showed congestion and scarring in the rectum and large load of fecal impaction noted proximal to area of stricture. - s/p loop colostomy      Nicotine Dependence  - Patient has a 43 pack year history and currently smokes about 1 ppd  - Nicotine patch, 14 mg/24 hrs       Diet: NPO per surgery, will consider advancing to clear liquids today   DVT Prophylaxis: Lovenox  Code Status: DNR  Point of Contact: Saw Goins, [phone], Sister   Narcisa Polanco, [phone], Brother-in-Law      Disposition and anticipated LOS: >2 midnights    H.  Nelson Dsouza DO   [facility]   January 27, 2018

## 2018-01-27 NOTE — PROGRESS NOTES
Dinesh LEWIS [facility] [employer], M.D. FACS  PROGRESS NOTE    Name: Hugo Lan MRN: [id number]   : 1955 [facility]   Date: 2018 Admission Date: 2018  2:12 PM     Hospital Day: 4  2 Days Post-Op  Subjective:  Patient with continued confusion most likely secondary to delirium tremors. Colostomy is functioning well. Again the nurses remove the colostomy bag because there was leaking. I discussed placing the Eakins seal and stomal paste to prevent leakage. Objective:  Vitals:    18 0502 18 0715 18 1134 18 1637   BP: 153/84 111/62 138/76 141/78   Pulse: (!) 117 64 75 82   Resp: 22 20 20 20   Temp:  97.3 °F (36.3 °C) 97.9 °F (36.6 °C) 98.2 °F (36.8 °C)   SpO2: 96% 94% 98% 94%   Weight:       Height:           Date 18 0700 - 18 0659 18 0700 - 18 0659   Shift 2919-5349 0108-5877 24 Hour Total 1880-4374 2354-3726 24 Hour Total   I  N  T  A  K  E   P. O.  0 0         P. O.  0 0       I.V.  (mL/kg/hr) 400  (0.5) 2116.7  (2.7) 2516.7  (1.6)         Volume (dextrose 5 % - 0.45% NaCl infusion) 100 1866.7 1966.7         Volume (ampicillin-sulbactam (UNASYN) 3 g in 0.9% sodium chloride (MBP/ADV) 100 mL MBP) 300 100 400         Volume (thiamine (B-1) 50 mg in 0.9% sodium chloride 50 mL IVPB) 0 50 50         Volume (promethazine (PHENERGAN) 25 mg in 0.9% sodium chloride 50 mL IVPB) 0 100 100       Shift Total  (mL/kg) 400  (6.3) 2116.7  (31.9) 2516.7  (38)      O  U  T  P  U  T   Urine  (mL/kg/hr) 700  (0.9) 730  (0.9) 1430  (0.9) 700  700      Urine Voided  230 230         Urine Output (mL) (Urinary Catheter 18 Patel)  700  700    Emesis/NG output            Emesis Occurrence(s)  0 x 0 x       Stool  300 300         Stool Occurrence(s)  1 x 1 x 1 x  1 x      Output (ml) (Colostomy 18 Left; Lower  Abdomen)  300 300       Shift Total  (mL/kg) 700  (10.9) 1030  (15.5) 1730  (26.1) 700  (10.6)  700  (10.6)   NET -300 1086.7 786.7 -700  -700   Weight (kg) 64 66.3 66.3 66.3 66.3 66.3         Physical Exam:    General: Confused but noncombative   Abdomen: abdomen is soft with peristomal redness and tenderness. Incision(s) are  C/D/I. The stoma is pink and viable. No masses, organomegaly or guarding    Labs:  Recent Results (from the past 24 hour(s))   CBC WITH AUTOMATED DIFF    Collection Time: 01/27/18  3:07 AM   Result Value Ref Range    WBC 6.9 4.6 - 13.2 K/uL    RBC 4.18 (L) 4.70 - 5.50 M/uL    HGB 13.8 13.0 - 16.0 g/dL    HCT 40.4 36.0 - 48.0 %    MCV 96.7 74.0 - 97.0 FL    MCH 33.0 24.0 - 34.0 PG    MCHC 34.2 31.0 - 37.0 g/dL    RDW 13.0 11.6 - 14.5 %    PLATELET 694 080 - 172 K/uL    MPV 9.9 9.2 - 11.8 FL    NEUTROPHILS 72 40 - 73 %    LYMPHOCYTES 15 (L) 21 - 52 %    MONOCYTES 9 3 - 10 %    EOSINOPHILS 4 0 - 5 %    BASOPHILS 0 0 - 2 %    ABS. NEUTROPHILS 4.9 1.8 - 8.0 K/UL    ABS. LYMPHOCYTES 1.1 0.9 - 3.6 K/UL    ABS. MONOCYTES 0.6 0.05 - 1.2 K/UL    ABS. EOSINOPHILS 0.3 0.0 - 0.4 K/UL    ABS.  BASOPHILS 0.0 0.0 - 0.1 K/UL    DF AUTOMATED     METABOLIC PANEL, BASIC    Collection Time: 01/27/18  3:07 AM   Result Value Ref Range    Sodium 148 (H) 136 - 145 mmol/L    Potassium 3.3 (L) 3.5 - 5.5 mmol/L    Chloride 115 (H) 100 - 108 mmol/L    CO2 28 21 - 32 mmol/L    Anion gap 5 3.0 - 18 mmol/L    Glucose 97 74 - 99 mg/dL    BUN 15 7.0 - 18 MG/DL    Creatinine 0.59 (L) 0.6 - 1.3 MG/DL    BUN/Creatinine ratio 25 (H) 12 - 20      GFR est AA >60 >60 ml/min/1.73m2    GFR est non-AA >60 >60 ml/min/1.73m2    Calcium 8.0 (L) 8.5 - 10.1 MG/DL   PROTHROMBIN TIME + INR    Collection Time: 01/27/18  3:07 AM   Result Value Ref Range    Prothrombin time 14.5 11.5 - 15.2 sec    INR 1.2 0.8 - 1.2     MAGNESIUM    Collection Time: 01/27/18  3:07 AM   Result Value Ref Range    Magnesium 1.9 1.6 - 2.6 mg/dL   POTASSIUM    Collection Time: 01/27/18 12:30 PM   Result Value Ref Range    Potassium 3.0 (L) 3.5 - 5.5 mmol/L     All Micro Results     None Current Medications:  Current Facility-Administered Medications   Medication Dose Route Frequency Provider Last Rate Last Dose    HYDROmorphone (PF) (DILAUDID) injection 1 mg  1 mg IntraVENous Q4H PRN DIANE Suárez        LORazepam (ATIVAN) injection 1 mg  1 mg IntraVENous Q6H PRN DIANE Suárez   1 mg at 01/27/18 1419    potassium chloride 10 mEq, lidocaine (PF) (XYLOCAINE) 10 mg/mL (1 %) 1 mL in 0.9% sodium chloride 100 mL IVPB   IntraVENous Q1H DIANE Suárez        dextrose 5 % - 0.45% NaCl infusion  100 mL/hr IntraVENous CONTINUOUS Garfield Encarnacion  mL/hr at 01/26/18 2044 100 mL/hr at 01/26/18 2044    famotidine (PF) (PEPCID) injection 20 mg  20 mg IntraVENous DAILY Mauri Gan MD   20 mg at 01/27/18 0845    thiamine (B-1) 50 mg in 0.9% sodium chloride 50 mL IVPB  50 mg IntraVENous DAILY Mauri Gan MD   50 mg at 01/27/18 1356    sodium chloride (NS) flush 5-10 mL  5-10 mL IntraVENous PRN Halina Garay DO        ketorolac (TORADOL) injection 15 mg  15 mg IntraVENous Q8H Diane Russo MD   15 mg at 01/27/18 1323    ampicillin-sulbactam (UNASYN) 3 g in 0.9% sodium chloride (MBP/ADV) 100 mL MBP  3 g IntraVENous Q6H Diane Russo  mL/hr at 01/27/18 1322 3 g at 01/27/18 1322    enoxaparin (LOVENOX) injection 40 mg  40 mg SubCUTAneous Q24H Halina Garay DO   40 mg at 01/26/18 2041    nicotine (NICODERM CQ) 14 mg/24 hr patch 1 Patch  1 Patch TransDERmal DAILY Sue Edmond MD   1 Patch at 01/27/18 0900    sodium chloride (NS) flush 5-10 mL  5-10 mL IntraVENous Q8H Sue Edmond MD   Stopped at 01/27/18 1400    sodium chloride (NS) flush 5-10 mL  5-10 mL IntraVENous PRN Sue Edmond MD   10 mL at 01/24/18 2238    naloxone (NARCAN) injection 0.4 mg  0.4 mg IntraVENous EVERY 2 MINUTES AS NEEDED Sue Edmond MD        promethazine (PHENERGAN) 25 mg in 0.9% sodium chloride 50 mL IVPB  25 mg IntraVENous Q6H PRN Theo Connor MD   25 mg at 01/27/18 0502    sodium chloride (NS) flush 5-10 mL  5-10 mL IntraVENous PRN Neil Bentley DO           Chart and notes reviewed. Data reviewed. I have evaluated and examined the patient. IMPRESSION:   · It is postop day 2 from urgent diverting loop descending colon colostomy for rectosigmoid colon stenosis causing a large bowel obstruction. PLAN:/DISCUSION:   · Replace colostomy with appropriate protection for surrounding skin.   · Continue present care        Jaret Hawley MD

## 2018-01-27 NOTE — PROGRESS NOTES
Intern PM Check Progress Note  [facility]       Patient: Estefany Hill MRN: [id number]  CSN: [id number]    YOB: 1955  Age: 57 y.o. Sex: male    DOA: 1/24/2018 LOS:  LOS: 2 days                    Subjective:     Patient was sleeping comfortably in bed and easily aroused to voice. Per nursing, daughter had visited patient all day and left around 6:30pm. Around 7pm, patient was hollering and agitated and trying to get out of bed. RN gave patient IV ativan 1 mg x1 and patient had been resting ever since. RN reports that patient is much calmer and not agitated when daughter is present. RN stated she will daughter a call and see if she can spend some time with patient overnight as well. ROS: unable to obtain due to mentation    Objective:      Patient Vitals for the past 24 hrs:   Temp Pulse Resp BP SpO2   01/26/18 1930 97.5 °F (36.4 °C) 93 20 149/74 97 %   01/26/18 1710 98 °F (36.7 °C) 79 20 122/63 98 %   01/26/18 1200 97.8 °F (36.6 °C) 70 20 149/70 95 %   01/26/18 0810 97.7 °F (36.5 °C) 88 25 148/82 98 %   01/26/18 0729 97.5 °F (36.4 °C) 67 22 135/67 95 %   01/26/18 0432 96.9 °F (36.1 °C) 89 20 113/57 100 %   01/26/18 0007 97.3 °F (36.3 °C) 67 18 136/80 97 %   01/25/18 2228 98.1 °F (36.7 °C) 85 18 155/81 97 %       Intake/Output Summary (Last 24 hours) at 01/26/18 2207  Last data filed at 01/26/18 1930   Gross per 24 hour   Intake              450 ml   Output             2440 ml   Net            -1990 ml       Physical Exam:   General:  Alert and Responsive and in no acute distress. CV:  RRR, no murmurs/rubs/gallops. RESP:  Unlabored breathing. Lungs clear to auscultation. No wheeze, rales, or rhonchi. Equal expansion bilaterally. ABD:  Soft, nontender, nondistended. No hepatosplenomegaly. No suprapubic tenderness. Left colostomy in place with no output. Ext:  No edema. Assessment/Plan     57 y. o. male with PMH of malignant bladder cancer, urinary incontinence, T2DM, neuropathy, chronic constipation, nicotine dependence and alcohol abuse, now admitted with large bowel obstruction.    - Continue stepdown admission  - Surgery following, appreciate recs  - Continue PCA Pump for pain control, adjust accordingly (will contact daughter about how often patient is needing to push button)  - Continue IV ativan 1 mg q4h prn for restlessness/agitation  - Continue soft restraints  - Continue redirection and reorientation  - Continue management per primary day team    Heber Kent MD, PGY-1  [facility]

## 2018-01-27 NOTE — PROGRESS NOTES
1925  Bedside turnover given to me by RN Jeanna Kaur. During report patient is on cardiac monitor, with restraints on.  Pt's daughter left recently and he is very restless and agitated. I believe his daughter or visitors assisted him with use of PCA pump. Since his visitors left at 6:15 pm there have been no attempts. Pt is asking for a knife and scissors, he states \"To kill myself\". Asked if he is having pain he stated \"Yes\" I pushed PCA pump for him. He is not making a lot of sense however he is yelling for scissors. Patient was moved closer to the nurses station to bed 2306.    2030 Colostomy bag leaking around the bag and onto the bed, a large amount of brown liquid stool is on the bed. Pt was cleaned up and a new gown placed on him, bag was changed out and abdominal pads were placed around his site for leakage. Patient rested for a few hours, when he woke he was trembling again and shaking his legs, I asked him if he had pain and he said no, no no pain, he visibly has pain. Patient does not seem to understand that he has the ability to use the PCA pump or he is too confused to verbalize or identify with pain as the feeling that is making him restless and agitated. [facility] family doctors visited patient, he had recently had ativan and was resting quietly. I informed them that he was not pushing his PCA pump to received medication, he was only getting the continuous amount of 0.1 which may not be adequate considering the redness and his skin swelling around the site and his tremors and shaking of his legs each time his abdomen is touched.    0205 patient awake and irritable. He called out for his daughter a few times. He is trying to take off his restraints which I explained to him are there to assist his healing of his colostomy. I explained they will be taken off soon. Pt'[s colostomy leaked again on the bed and his gown.   He was cleaned up and changed and chux pads were placed under the sides of colostomy bag.    0215 am due to MADDISON and his daughter stating \"He has not had anything to drink in a few days however he does drink a lot. Pt is sitting up in bed with hand restraints on attempting to take them off. Ativan given. 0455 am pt cleaned and colostomy bag changed out. Pt had a large amount of watery stool that leaked out around him and on the bed. When we cleaned his abdomen with warm wipes he started to tremor, kick his legs, yell and tried continuously to pull his knees up to his chest.  It was noted that he has some swelling all the way around the surgical site. [facility] family Doctor on shift arrived and saw the patient's surgical site and we discussed possibly changing the patient to a continuous dose of more than the 0.1 on the PCA pump due to the patient not using the push button to dispense medication when he is in pain. Refill for PCA dilaudid arrived from the pharmacy, given to telemetry nurse [facility].

## 2018-01-27 NOTE — PROGRESS NOTES
*ATTENTION:  This note has been created by a medical student for educational purposes only. Please do not refer to the content of this note for clinical decision-making, billing, or other purposes. Please see attending physicians note to obtain clinical information on this patient. *         Medical student Progress Note  [facility]       Patient: Bard Stephens MRN: [id number]  CSN: [id number]    YOB: 1955  Age: 57 y.o. Sex: male    DOA: 1/24/2018 LOS:  LOS: 3 days                    Subjective:     Acute events:  Patient became confused and agitated overnight. Nurses state that he tried to get out of bed even with restraints. Nurse administered PCA dilaudid, ativan and toradol, which has calmed the patient down. He does not seem to be able to use PCA pump correctly as he does not know how it works and does not press the button. Colostomy also began to leak liquid stool overnight. This morning, the patient was seen sleeping at his bedside and was difficult to arouse. He was unable to answer his name and where he was, but was able to answer who his daughter was. He would keep drifting back into sleep. There was significant liquid stool leaking from the colostomy site. Nurses noted that surgical site began to look erythemotous and tender to palpation.       ROS  Patient confused and delrious and unable to answer questions    Objective:      Patient Vitals for the past 24 hrs:   Temp Pulse Resp BP SpO2   01/27/18 0715 97.3 °F (36.3 °C) 64 20 111/62 94 %   01/27/18 0502 - (!) 117 22 153/84 96 %   01/27/18 0400 98.3 °F (36.8 °C) 93 18 141/82 93 %   01/27/18 0000 97.6 °F (36.4 °C) 82 18 125/72 93 %   01/26/18 1930 97.5 °F (36.4 °C) 93 20 149/74 97 %   01/26/18 1710 98 °F (36.7 °C) 79 20 122/63 98 %   01/26/18 1200 97.8 °F (36.6 °C) 70 20 149/70 95 %         Intake/Output Summary (Last 24 hours) at 01/27/18 1001  Last data filed at 01/27/18 0640   Gross per 24 hour Intake          2516.67 ml   Output             1730 ml   Net           786.67 ml       Physical Exam:   General: No acute distress. Responsive to stimuli. CV:  RRR, no murmurs. S1, S2. No visible pulsations or thrills. RESP:  Unlabored breathing. ABD:  Soft, nondistended. Colostomy bag intact leaking liquid stool from the site. Neuro: Arousable with stimuli  Ext:  No edema. Skin: Warm and dry      Lab/Data Reviewed:  Results for Donal Bhatt (MRN [id number]) as of 1/27/2018 10:09   Ref. Range 1/27/2018 03:07   WBC Latest Ref Range: 4.6 - 13.2 K/uL 6.9   RBC Latest Ref Range: 4.70 - 5.50 M/uL 4.18 (L)   HGB Latest Ref Range: 13.0 - 16.0 g/dL 13.8   HCT Latest Ref Range: 36.0 - 48.0 % 40.4   MCV Latest Ref Range: 74.0 - 97.0 FL 96.7   MCH Latest Ref Range: 24.0 - 34.0 PG 33.0   MCHC Latest Ref Range: 31.0 - 37.0 g/dL 34.2   RDW Latest Ref Range: 11.6 - 14.5 % 13.0   PLATELET Latest Ref Range: 135 - 420 K/uL 220   MPV Latest Ref Range: 9.2 - 11.8 FL 9.9   NEUTROPHILS Latest Ref Range: 40 - 73 % 72   LYMPHOCYTES Latest Ref Range: 21 - 52 % 15 (L)   MONOCYTES Latest Ref Range: 3 - 10 % 9   EOSINOPHILS Latest Ref Range: 0 - 5 % 4   BASOPHILS Latest Ref Range: 0 - 2 % 0   DF Latest Units:   AUTOMATED   ABS. NEUTROPHILS Latest Ref Range: 1.8 - 8.0 K/UL 4.9   ABS. LYMPHOCYTES Latest Ref Range: 0.9 - 3.6 K/UL 1.1   ABS. MONOCYTES Latest Ref Range: 0.05 - 1.2 K/UL 0.6   ABS. EOSINOPHILS Latest Ref Range: 0.0 - 0.4 K/UL 0.3   ABS.  BASOPHILS Latest Ref Range: 0.0 - 0.1 K/UL 0.0   INR Latest Ref Range: 0.8 - 1.2   1.2   Prothrombin time Latest Ref Range: 11.5 - 15.2 sec 14.5   Sodium Latest Ref Range: 136 - 145 mmol/L 148 (H)   Potassium Latest Ref Range: 3.5 - 5.5 mmol/L 3.3 (L)   Chloride Latest Ref Range: 100 - 108 mmol/L 115 (H)   CO2 Latest Ref Range: 21 - 32 mmol/L 28   Anion gap Latest Ref Range: 3.0 - 18 mmol/L 5   Glucose Latest Ref Range: 74 - 99 mg/dL 97   BUN Latest Ref Range: 7.0 - 18 MG/DL 15 Creatinine Latest Ref Range: 0.6 - 1.3 MG/DL 0.59 (L)   BUN/Creatinine ratio Latest Ref Range: 12 - 20   25 (H)   Calcium Latest Ref Range: 8.5 - 10.1 MG/DL 8.0 (L)   Magnesium Latest Ref Range: 1.6 - 2.6 mg/dL 1.9   GFR est non-AA Latest Ref Range: >60 ml/min/1.73m2 >60   GFR est AA Latest Ref Range: >60 ml/min/1.73m2 >60       Scheduled Medications Reviewed:  Current Facility-Administered Medications   Medication Dose Route Frequency    potassium chloride 10 mEq, lidocaine (PF) (XYLOCAINE) 10 mg/mL (1 %) 1 mL in 0.9% sodium chloride 100 mL IVPB   IntraVENous Q1H    dextrose 5 % - 0.45% NaCl infusion  100 mL/hr IntraVENous CONTINUOUS    famotidine (PF) (PEPCID) injection 20 mg  20 mg IntraVENous DAILY    thiamine (B-1) 50 mg in 0.9% sodium chloride 50 mL IVPB  50 mg IntraVENous DAILY    HYDROmorphone (PF) (DILAUDID) 30 mg / 30 mL PCA   IntraVENous TITRATE    ketorolac (TORADOL) injection 15 mg  15 mg IntraVENous Q8H    ampicillin-sulbactam (UNASYN) 3 g in 0.9% sodium chloride (MBP/ADV) 100 mL MBP  3 g IntraVENous Q6H    enoxaparin (LOVENOX) injection 40 mg  40 mg SubCUTAneous Q24H    nicotine (NICODERM CQ) 14 mg/24 hr patch 1 Patch  1 Patch TransDERmal DAILY    sodium chloride (NS) flush 5-10 mL  5-10 mL IntraVENous Q8H         Imaging, microbiology, and EKG/Telemetry:  No new imaging in past 24 hours    Assessment/Plan     57 y. o. male with PMH of malignant bladder cancer s/p TURBT and chemo and radiaiton therapy, chronic constipation, urinary incontinence, T2DM, neuropathy, nicotine dependence and alcohol abuse, now admitted with large bowel obstruction s/p loop descending colostomy 1/25/18.      Colonic obstruction s/p loop descending colostomy:  This is likely due to rectal thickening as seen by colonoscopy in 1/8 and as well as medications such as fentanyl and hydrocodone that reduced his GI function.  CT abdomen showedcolonic obstruction with focal transition near rectosigmoid junction and increased perirectal fat stranding. He has had abdominal pain and obstructive symptoms for a week. On PE, he was tender to palpation with guarding of abdomen, and had active bowel sounds. It is possible that bladder cancer has metastasized to rectosigmoid junction and caused obstruction, but it is unlikely from colonoscopy result few weeks back.  Patient also showed low potassium levels, likely due to dehydration. Patient has had gastrograffin test and has had loop descending colostomy. Colostomy bag is now leaking with liquid stool and the surgical site is noted to be erythematous.                             -Continue pain control via PCA pump: Possibly have nurse press the pump every few hours and increase dosage as patient still experiencing pain             - Continue Ativan as patient gets easily agitated                                              - Continue D5 1/2 NS @ 100 mL/hr; continue potassium infusion - monitor BMP                         -Possible broad spectrum antibiotics for cellulitis    -Consult wound care nurse/surgery for the surgical site infection    - Monitor input and output; possible octreotide to slow down output                          Bladder Cancer [facility]): Patient has history of bladder cancer and is followed by medical oncologist and urological oncologist.  Increasing adenopathy and lytic lesions in skeletal structures noted in CT scan likely due to metastasis         -Consult palliative care as recommended by medical oncologist; hospice care when D/C     Alcohol withdrawal: Patient likely has alcohol withdrawal symptoms given his history of drinking.                                               - Restrain for now                                              - Ativan as needed                       Peripheral Neuropathy from chemo                        -Continue gabapentin PO when off NPO      Diet: NPO  DVT Prophylaxis: Lovenox  Code Status: DNR  Point of Contact: Sister, Keyona Gipson Meredith    Disposition: Continue care in step down unit    [address] [facility]  01/27/18 10:01 AM

## 2018-01-27 NOTE — ROUTINE PROCESS
Patient med. With ativan 2mg IV for agitation, uncooperative and pulling at lines. He pulled colostomy bag and wafer off.

## 2018-01-27 NOTE — PROGRESS NOTES
Hematology/Medical Oncology Progress Note      NAME: Genaro Jordan   :  1955  MRM:  [id number]    Date/Time: 2018  10:27 AM         Subjective:     Mr Lissy Nieto is a 57 y.o. Man with a history of metastatic bladder cancer. He was admitted with abdominal pain and found to have  Colonic obstruction. He underwent a laparotomy and his ostomy output is now good. He remains confused likely related to alcohol withdrawal symptoms. There are no complaints of pain. Past Medical History reviewed and unchanged from Admission History and Physical    Review of Systems   Constitutional: Positive for fatigue. Negative for activity change, appetite change, chills, diaphoresis, fever and unexpected weight change. HENT: Negative for congestion, facial swelling, mouth sores, nosebleeds, postnasal drip, trouble swallowing and voice change. Eyes: Negative for photophobia, pain, discharge and itching. Respiratory: Negative for apnea, cough, choking, chest tightness, wheezing and stridor. Cardiovascular: Negative for chest pain, palpitations and leg swelling. Gastrointestinal: Negative for abdominal pain, blood in stool, constipation, diarrhea, nausea and rectal pain. Genitourinary: Negative for difficulty urinating, dysuria, flank pain, hematuria and urgency. Musculoskeletal: Negative for arthralgias, back pain, gait problem, joint swelling, neck pain and neck stiffness. Skin: Negative for color change, pallor, rash and wound. Neurological: Negative for dizziness, facial asymmetry, speech difficulty, weakness, light-headedness and headaches. Hematological: Negative for adenopathy. Does not bruise/bleed easily. Psychiatric/Behavioral: Negative for agitation, confusion, hallucinations and sleep disturbance. Objective:       Vitals:      Last 24hrs VS reviewed since prior progress note.  Most recent are:    Visit Vitals    /62    Pulse 64    Temp 97.3 °F (36.3 °C)    Resp 20    Ht 5' 9\" (1.753 m)    Wt 66.3 kg (146 lb 3.2 oz)    SpO2 94%    BMI 21.59 kg/m2     SpO2 Readings from Last 6 Encounters:   01/27/18 94%   01/08/18 97%   01/23/17 100%   06/11/16 99%   03/03/16 99%   10/01/15 100%    O2 Flow Rate (L/min): 2 l/min     Intake/Output Summary (Last 24 hours) at 01/27/18 1027  Last data filed at 01/27/18 0640   Gross per 24 hour   Intake          2516.67 ml   Output             1730 ml   Net           786.67 ml          Exam:      Physical Exam   Nursing note and vitals reviewed. Constitutional: He is oriented to person, place, and time. He appears well-developed and well-nourished. No distress. HENT:   Head: Normocephalic and atraumatic. Mouth/Throat: No oropharyngeal exudate. Eyes: Conjunctivae and EOM are normal. Pupils are equal, round, and reactive to light. Right eye exhibits no discharge. Left eye exhibits no discharge. No scleral icterus. Neck: Normal range of motion. Neck supple. No tracheal deviation present. No thyromegaly present. Cardiovascular: Normal rate and regular rhythm. Exam reveals no gallop and no friction rub. No murmur heard. Pulmonary/Chest: Effort normal and breath sounds normal. No apnea. No respiratory distress. He has no wheezes. He has no rales. Chest wall is not dull to percussion. He exhibits no mass, no tenderness, no bony tenderness, no laceration, no crepitus, no edema, no deformity, no swelling and no retraction. Right breast exhibits no inverted nipple, no mass, no nipple discharge, no skin change and no tenderness. Left breast exhibits no inverted nipple, no mass, no nipple discharge, no skin change and no tenderness. Breasts are symmetrical.   Abdominal: Soft. Bowel sounds are normal. He exhibits no distension. There is no tenderness. There is no rebound and no guarding. Musculoskeletal: Normal range of motion. He exhibits no edema or tenderness. Lymphadenopathy:     He has no cervical adenopathy.    Neurological: He is alert and oriented to person, place, and time. Coordination normal.   Skin: Skin is warm and dry. No rash noted. He is not diaphoretic. No erythema. No pallor.    Psychiatric:   Confused       Telemetry reviewed:   normal sinus rhythm      Lab Data Reviewed: (see below)      Medications:  Current Facility-Administered Medications   Medication Dose Route Frequency    potassium chloride 10 mEq, lidocaine (PF) (XYLOCAINE) 10 mg/mL (1 %) 1 mL in 0.9% sodium chloride 100 mL IVPB   IntraVENous Q1H    dextrose 5 % - 0.45% NaCl infusion  100 mL/hr IntraVENous CONTINUOUS    LORazepam (ATIVAN) injection 1 mg  1 mg IntraVENous Q4H PRN    famotidine (PF) (PEPCID) injection 20 mg  20 mg IntraVENous DAILY    thiamine (B-1) 50 mg in 0.9% sodium chloride 50 mL IVPB  50 mg IntraVENous DAILY    sodium chloride (NS) flush 5-10 mL  5-10 mL IntraVENous PRN    HYDROmorphone (PF) (DILAUDID) 30 mg / 30 mL PCA   IntraVENous TITRATE    ketorolac (TORADOL) injection 15 mg  15 mg IntraVENous Q8H    ampicillin-sulbactam (UNASYN) 3 g in 0.9% sodium chloride (MBP/ADV) 100 mL MBP  3 g IntraVENous Q6H    enoxaparin (LOVENOX) injection 40 mg  40 mg SubCUTAneous Q24H    nicotine (NICODERM CQ) 14 mg/24 hr patch 1 Patch  1 Patch TransDERmal DAILY    sodium chloride (NS) flush 5-10 mL  5-10 mL IntraVENous Q8H    sodium chloride (NS) flush 5-10 mL  5-10 mL IntraVENous PRN    naloxone (NARCAN) injection 0.4 mg  0.4 mg IntraVENous EVERY 2 MINUTES AS NEEDED    promethazine (PHENERGAN) 25 mg in 0.9% sodium chloride 50 mL IVPB  25 mg IntraVENous Q6H PRN    sodium chloride (NS) flush 5-10 mL  5-10 mL IntraVENous PRN       ______________________________________________________________________      Lab Review:     Recent Labs      01/27/18   0307  01/26/18   0425  01/25/18   0452   WBC  6.9  10.5  5.7   HGB  13.8  14.8  15.4   HCT  40.4  43.2  43.9   PLT  220  261  243     Recent Labs      01/27/18   0307  01/26/18   1540  01/26/18   0736   01/24/18 1545   NA  148*  148*  150*   < >  138   K  3.3*  3.3*  3.1*   < >  3.6   CL  115*  112*  111*   < >  99*   CO2  28  34*  33*   < >  34*   GLU  97  107*  130*   < >  142*   BUN  15  20*  23*   < >  20*   CREA  0.59*  0.55*  0.72   < >  0.99   CA  8.0*  8.5  8.1*   < >  9.6   MG  1.9   --    --    --    --    ALB   --    --    --    --   3.3*   SGOT   --    --    --    --   28   ALT   --    --    --    --   20   INR  1.2   --    --    --    --     < > = values in this interval not displayed. No components found for: GLPOC  No results for input(s): PH, PCO2, PO2, HCO3, FIO2 in the last 72 hours. Recent Labs      01/27/18   0307   INR  1.2       Other pertinent lab:          Assessment:     Principal Problem: Bowel obstruction (1/24/2018)    Active Problems:    Bladder cancer ([facility] (8/7/2013)      Nicotine dependence (3/27/2009)      Metastatic disease ([facility] (1/24/2018)      Intractable abdominal pain (1/24/2018)      Chemotherapy-induced neuropathy ([facility] (1/24/2018)      Alcohol abuse (1/24/2018)           Plan:     Risk of deterioration: medium             1. Bowel obstruction: status post surgical intervention. Followed by surgery. 2. Metastatic bladder cancer: not a candidate for any additional systemic therapy. Hospice is recommended. Total time spent with patient:20 minutes                  Care Plan discussed with: Nursing Staff and Consultant/Specialist    Discussed:  Care Plan    Prophylaxis:  Lovenox    Disposition:   To be dedetmined           ___________________________________________________    Attending Physician: Edmond Gaona MD

## 2018-01-27 NOTE — PROGRESS NOTES
Late entry:  The patient was seen 1/26/2018 at 0800 hrs. Awake and alert, disoriented but no apparent distress  The patient had taken off his colostomy bag. The colostomy was pink and viable functioning. The incision was intact. 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   Replace colostomy wafer with protective stoma paste  When the patient is mentally clear he can have sips of water

## 2018-01-27 NOTE — PROGRESS NOTES
Unable to see pt for skilled OT at this time due to: will hold OT at this time for the pt. Per pt's nurse pt is very sedated at this time due to agitation and restlessness last night, and not appropriate for skilled interventions. Will continue to follow. Thank you.     CHARMAINE Goodrich/RONI

## 2018-01-27 NOTE — PROGRESS NOTES
New PT orders seen and acknowledged. Pt evaluated 1/25/18, and currently on PT caseload.  Thank you for your referral.

## 2018-01-27 NOTE — PROGRESS NOTES
Patient leaking stool around colostomy site, nursing staff will change bag with larger rim to see if this resolves issue. Small Patch of eythema superior to olostomy site but not tender to palpation, patient currently on IV Unasyn. Patient very sedated and difficult to arouse this morning. Discussed with attending Dr Rj Carlton, orders changed to  · PCA discontinued. · Dilaudid 1mg q4hrs PRN,   · ativan 1mg q6hrs PRN, with instructions to hold for sedation  · Patient has Narcan orders PRN  · Nursing team informed of order change.      James Camarena MBBS   PGY-3 [facility]  01/27/18 11:12 AM

## 2018-01-28 LAB
ANION GAP SERPL CALC-SCNC: 5 MMOL/L (ref 3–18)
BASOPHILS # BLD: 0 K/UL (ref 0–0.1)
BASOPHILS NFR BLD: 1 % (ref 0–2)
BUN SERPL-MCNC: 6 MG/DL (ref 7–18)
BUN/CREAT SERPL: 13 (ref 12–20)
CALCIUM SERPL-MCNC: 7.3 MG/DL (ref 8.5–10.1)
CHLORIDE SERPL-SCNC: 113 MMOL/L (ref 100–108)
CO2 SERPL-SCNC: 27 MMOL/L (ref 21–32)
CREAT SERPL-MCNC: 0.48 MG/DL (ref 0.6–1.3)
DIFFERENTIAL METHOD BLD: ABNORMAL
EOSINOPHIL # BLD: 0.6 K/UL (ref 0–0.4)
EOSINOPHIL NFR BLD: 9 % (ref 0–5)
ERYTHROCYTE [DISTWIDTH] IN BLOOD BY AUTOMATED COUNT: 13 % (ref 11.6–14.5)
GLUCOSE SERPL-MCNC: 83 MG/DL (ref 74–99)
HCT VFR BLD AUTO: 36.7 % (ref 36–48)
HGB BLD-MCNC: 12.4 G/DL (ref 13–16)
LYMPHOCYTES # BLD: 1.2 K/UL (ref 0.9–3.6)
LYMPHOCYTES NFR BLD: 18 % (ref 21–52)
MAGNESIUM SERPL-MCNC: 1.4 MG/DL (ref 1.6–2.6)
MCH RBC QN AUTO: 32.5 PG (ref 24–34)
MCHC RBC AUTO-ENTMCNC: 33.8 G/DL (ref 31–37)
MCV RBC AUTO: 96.1 FL (ref 74–97)
MONOCYTES # BLD: 0.6 K/UL (ref 0.05–1.2)
MONOCYTES NFR BLD: 10 % (ref 3–10)
NEUTS SEG # BLD: 4.1 K/UL (ref 1.8–8)
NEUTS SEG NFR BLD: 62 % (ref 40–73)
PLATELET # BLD AUTO: 215 K/UL (ref 135–420)
PMV BLD AUTO: 10 FL (ref 9.2–11.8)
POTASSIUM SERPL-SCNC: 3 MMOL/L (ref 3.5–5.5)
POTASSIUM SERPL-SCNC: 3.1 MMOL/L (ref 3.5–5.5)
POTASSIUM SERPL-SCNC: 3.4 MMOL/L (ref 3.5–5.5)
RBC # BLD AUTO: 3.82 M/UL (ref 4.7–5.5)
SODIUM SERPL-SCNC: 145 MMOL/L (ref 136–145)
WBC # BLD AUTO: 6.5 K/UL (ref 4.6–13.2)

## 2018-01-28 PROCEDURE — 74011250636 HC RX REV CODE- 250/636: Performed by: FAMILY MEDICINE

## 2018-01-28 PROCEDURE — 74011250636 HC RX REV CODE- 250/636: Performed by: SURGERY

## 2018-01-28 PROCEDURE — 74011000258 HC RX REV CODE- 258: Performed by: STUDENT IN AN ORGANIZED HEALTH CARE EDUCATION/TRAINING PROGRAM

## 2018-01-28 PROCEDURE — 84132 ASSAY OF SERUM POTASSIUM: CPT | Performed by: SURGERY

## 2018-01-28 PROCEDURE — 36415 COLL VENOUS BLD VENIPUNCTURE: CPT | Performed by: SURGERY

## 2018-01-28 PROCEDURE — 74011250636 HC RX REV CODE- 250/636: Performed by: STUDENT IN AN ORGANIZED HEALTH CARE EDUCATION/TRAINING PROGRAM

## 2018-01-28 PROCEDURE — 74011000258 HC RX REV CODE- 258: Performed by: FAMILY MEDICINE

## 2018-01-28 PROCEDURE — 74011000250 HC RX REV CODE- 250: Performed by: EMERGENCY MEDICINE

## 2018-01-28 PROCEDURE — 74011000258 HC RX REV CODE- 258: Performed by: SURGERY

## 2018-01-28 PROCEDURE — 84425 ASSAY OF VITAMIN B-1: CPT

## 2018-01-28 PROCEDURE — 74011250636 HC RX REV CODE- 250/636: Performed by: EMERGENCY MEDICINE

## 2018-01-28 PROCEDURE — 74011000258 HC RX REV CODE- 258: Performed by: EMERGENCY MEDICINE

## 2018-01-28 PROCEDURE — 84132 ASSAY OF SERUM POTASSIUM: CPT | Performed by: FAMILY MEDICINE

## 2018-01-28 PROCEDURE — 74011250637 HC RX REV CODE- 250/637: Performed by: STUDENT IN AN ORGANIZED HEALTH CARE EDUCATION/TRAINING PROGRAM

## 2018-01-28 PROCEDURE — 65660000004 HC RM CVT STEPDOWN

## 2018-01-28 PROCEDURE — 74011000250 HC RX REV CODE- 250: Performed by: STUDENT IN AN ORGANIZED HEALTH CARE EDUCATION/TRAINING PROGRAM

## 2018-01-28 PROCEDURE — 83735 ASSAY OF MAGNESIUM: CPT

## 2018-01-28 PROCEDURE — 74011000250 HC RX REV CODE- 250: Performed by: FAMILY MEDICINE

## 2018-01-28 PROCEDURE — 85025 COMPLETE CBC W/AUTO DIFF WBC: CPT | Performed by: SURGERY

## 2018-01-28 RX ORDER — HYDROMORPHONE HYDROCHLORIDE 1 MG/ML
1 INJECTION, SOLUTION INTRAMUSCULAR; INTRAVENOUS; SUBCUTANEOUS
Status: DISCONTINUED | OUTPATIENT
Start: 2018-01-28 | End: 2018-01-31

## 2018-01-28 RX ORDER — LORAZEPAM 2 MG/ML
1 INJECTION INTRAMUSCULAR
Status: DISCONTINUED | OUTPATIENT
Start: 2018-01-28 | End: 2018-01-31

## 2018-01-28 RX ORDER — HALOPERIDOL 5 MG/ML
2 INJECTION INTRAMUSCULAR
Status: DISCONTINUED | OUTPATIENT
Start: 2018-01-28 | End: 2018-01-29

## 2018-01-28 RX ORDER — DEXTROSE, SODIUM CHLORIDE, AND POTASSIUM CHLORIDE 5; .45; .15 G/100ML; G/100ML; G/100ML
100 INJECTION INTRAVENOUS CONTINUOUS
Status: DISCONTINUED | OUTPATIENT
Start: 2018-01-28 | End: 2018-02-01

## 2018-01-28 RX ORDER — MAGNESIUM SULFATE HEPTAHYDRATE 40 MG/ML
2 INJECTION, SOLUTION INTRAVENOUS
Status: COMPLETED | OUTPATIENT
Start: 2018-01-28 | End: 2018-01-28

## 2018-01-28 RX ORDER — HYDROMORPHONE HYDROCHLORIDE 1 MG/ML
0.5 INJECTION, SOLUTION INTRAMUSCULAR; INTRAVENOUS; SUBCUTANEOUS ONCE
Status: COMPLETED | OUTPATIENT
Start: 2018-01-28 | End: 2018-01-28

## 2018-01-28 RX ADMIN — Medication 10 ML: at 22:30

## 2018-01-28 RX ADMIN — LIDOCAINE HYDROCHLORIDE: 10 INJECTION, SOLUTION EPIDURAL; INFILTRATION; INTRACAUDAL; PERINEURAL at 20:38

## 2018-01-28 RX ADMIN — DEXTROSE MONOHYDRATE, SODIUM CHLORIDE, AND POTASSIUM CHLORIDE 100 ML/HR: 50; 4.5; 1.49 INJECTION, SOLUTION INTRAVENOUS at 16:42

## 2018-01-28 RX ADMIN — PROMETHAZINE HYDROCHLORIDE 25 MG: 25 INJECTION INTRAMUSCULAR; INTRAVENOUS at 02:36

## 2018-01-28 RX ADMIN — AMPICILLIN AND SULBACTAM 3 G: 1; 2 INJECTION, POWDER, FOR SOLUTION INTRAMUSCULAR; INTRAVENOUS at 03:31

## 2018-01-28 RX ADMIN — THIAMINE HYDROCHLORIDE 50 MG: 100 INJECTION, SOLUTION INTRAMUSCULAR; INTRAVENOUS at 08:47

## 2018-01-28 RX ADMIN — DEXTROSE MONOHYDRATE AND SODIUM CHLORIDE 100 ML/HR: 5; .45 INJECTION, SOLUTION INTRAVENOUS at 05:55

## 2018-01-28 RX ADMIN — HYDROMORPHONE HYDROCHLORIDE 1 MG: 1 INJECTION, SOLUTION INTRAMUSCULAR; INTRAVENOUS; SUBCUTANEOUS at 23:08

## 2018-01-28 RX ADMIN — ENOXAPARIN SODIUM 40 MG: 40 INJECTION SUBCUTANEOUS at 23:13

## 2018-01-28 RX ADMIN — AMPICILLIN AND SULBACTAM 3 G: 1; 2 INJECTION, POWDER, FOR SOLUTION INTRAMUSCULAR; INTRAVENOUS at 14:41

## 2018-01-28 RX ADMIN — HYDROMORPHONE HYDROCHLORIDE 1 MG: 1 INJECTION, SOLUTION INTRAMUSCULAR; INTRAVENOUS; SUBCUTANEOUS at 09:13

## 2018-01-28 RX ADMIN — HALOPERIDOL LACTATE 2 MG: 5 INJECTION, SOLUTION INTRAMUSCULAR at 20:39

## 2018-01-28 RX ADMIN — HYDROMORPHONE HYDROCHLORIDE 1 MG: 2 INJECTION, SOLUTION INTRAMUSCULAR; INTRAVENOUS; SUBCUTANEOUS at 00:41

## 2018-01-28 RX ADMIN — AMPICILLIN AND SULBACTAM 3 G: 1; 2 INJECTION, POWDER, FOR SOLUTION INTRAMUSCULAR; INTRAVENOUS at 09:24

## 2018-01-28 RX ADMIN — LORAZEPAM 1 MG: 2 INJECTION INTRAMUSCULAR; INTRAVENOUS at 02:39

## 2018-01-28 RX ADMIN — MAGNESIUM SULFATE IN WATER 2 G: 40 INJECTION, SOLUTION INTRAVENOUS at 18:00

## 2018-01-28 RX ADMIN — LIDOCAINE HYDROCHLORIDE: 10 INJECTION, SOLUTION EPIDURAL; INFILTRATION; INTRACAUDAL; PERINEURAL at 09:34

## 2018-01-28 RX ADMIN — LIDOCAINE HYDROCHLORIDE: 10 INJECTION, SOLUTION EPIDURAL; INFILTRATION; INTRACAUDAL; PERINEURAL at 12:36

## 2018-01-28 RX ADMIN — KETOROLAC TROMETHAMINE 15 MG: 30 INJECTION, SOLUTION INTRAMUSCULAR at 12:33

## 2018-01-28 RX ADMIN — AMPICILLIN AND SULBACTAM 3 G: 1; 2 INJECTION, POWDER, FOR SOLUTION INTRAMUSCULAR; INTRAVENOUS at 23:08

## 2018-01-28 RX ADMIN — LIDOCAINE HYDROCHLORIDE: 10 INJECTION, SOLUTION EPIDURAL; INFILTRATION; INTRACAUDAL; PERINEURAL at 08:47

## 2018-01-28 RX ADMIN — LIDOCAINE HYDROCHLORIDE: 10 INJECTION, SOLUTION EPIDURAL; INFILTRATION; INTRACAUDAL; PERINEURAL at 22:05

## 2018-01-28 RX ADMIN — FAMOTIDINE 20 MG: 10 INJECTION INTRAVENOUS at 09:12

## 2018-01-28 RX ADMIN — MAGNESIUM SULFATE IN WATER 2 G: 40 INJECTION, SOLUTION INTRAVENOUS at 16:42

## 2018-01-28 RX ADMIN — KETOROLAC TROMETHAMINE 15 MG: 30 INJECTION, SOLUTION INTRAMUSCULAR at 23:13

## 2018-01-28 RX ADMIN — Medication 10 ML: at 05:53

## 2018-01-28 RX ADMIN — HALOPERIDOL LACTATE 5 MG: 5 INJECTION, SOLUTION INTRAMUSCULAR at 12:32

## 2018-01-28 RX ADMIN — Medication 10 ML: at 12:33

## 2018-01-28 RX ADMIN — KETOROLAC TROMETHAMINE 15 MG: 30 INJECTION, SOLUTION INTRAMUSCULAR at 05:45

## 2018-01-28 RX ADMIN — LORAZEPAM 1 MG: 2 INJECTION INTRAMUSCULAR; INTRAVENOUS at 14:47

## 2018-01-28 RX ADMIN — LORAZEPAM 1 MG: 2 INJECTION INTRAMUSCULAR; INTRAVENOUS at 21:51

## 2018-01-28 RX ADMIN — LORAZEPAM 1 MG: 2 INJECTION INTRAMUSCULAR; INTRAVENOUS at 09:20

## 2018-01-28 RX ADMIN — HYDROMORPHONE HYDROCHLORIDE 0.5 MG: 1 INJECTION, SOLUTION INTRAMUSCULAR; INTRAVENOUS; SUBCUTANEOUS at 02:45

## 2018-01-28 RX ADMIN — HYDROMORPHONE HYDROCHLORIDE 1 MG: 1 INJECTION, SOLUTION INTRAMUSCULAR; INTRAVENOUS; SUBCUTANEOUS at 05:47

## 2018-01-28 RX ADMIN — HYDROMORPHONE HYDROCHLORIDE 1 MG: 1 INJECTION, SOLUTION INTRAMUSCULAR; INTRAVENOUS; SUBCUTANEOUS at 16:41

## 2018-01-28 NOTE — PROGRESS NOTES
New OT orders received and chart reviewed. Patient was evaluated on 1/26/18 and on OT caseload. New OT order will be acknowledged.  Thank you for the referral.     Lindsay Fabian OTR/RONI

## 2018-01-28 NOTE — PROGRESS NOTES
NUTRITION    Nursing Referral: [facility]  Nutrition Consult: General Nutrition Management & Supplements     RECOMMENDATIONS / PLAN:     - Start po diet when medically appropriate, as pt able to tolerate po  - Continue RD inpatient monitoring and evaluation. NUTRITION INTERVENTIONS & DIAGNOSIS:     [x] IV fluid: D5 1/2 NS at 100 mL/hr (120 gm dextrose, 408 kcal/day)  [x] Coordination and referral of nutrition care: Discussed TPN recommendation with Dr Anne-Marie Lacey. Pt discussed with Dr Bernardino Narvaez    Nutrition Diagnosis: Unintentional weight loss related to inadequate energy intake and altered GI function as evidenced by patient unable to tolerate po with nausea/vomiting resulting in 24 lb, 15% weight loss x 4 months. Patient meets criteria for Severe Protein Calorie Malnutrition as evidenced by:   ASPEN Malnutrition Criteria  Acute Illness, Chronic Illness, or Social/Enviornmental: Chronic illness  Energy Intake: <75% est energy req for greater than/equal to 1 month  Weight Loss: Greater than 10% x 6 mos  ASPEN Malnutrition Score - Chronic Illness: 7  Chronic Illness - Malnutrition Diagnosis: Severe malnutrition. ASSESSMENT:     1/28: Pt remains NPO. Had leaking around colostomy bag. Hospice following. Discussed nutrition status and TPN recommendation with Dr Anne-Marie Lacey ([address] [place] [address]). Per MD, no plan to start oral diet today. Pt not medically appropriate for TPN per MD. Plan for pt to resume Hospice with comfort care following discharge. Addendum 1357: Received nutrition consult fom Dr Bernardino Narvaez for TPN. Discussed plan of care goals for pt to resume Hospice with comfort following discharge. Per MD, will not start TPN and MD to discontinue consult order. 1/26: Remains NPO. S/p loop descending colostomy d/t large bowel obstruction. Pt pulled out NGT this am. Not replaced yet. Per discussion with MD no plan for oral diet today and discussed PN recommendation.   1/25: Pt c/o abdominal pain, nausea and constipation with poor po intake PTA, unable to keep food down for the past 4-5 days. Developing colonic obstruction per CT, Surgery and Palliative Medicine consulted. Average po intake adequate to meet patients estimated nutritional needs:   [] Yes     [x] No   [] Unable to determine at this time    Diet: DIET NPO      Food Allergies: NKFA  Current Appetite:   [] Good     [] Fair     [] Poor     [x] Other: NPO  Appetite/meal intake prior to admission:   [] Good     [] Fair     [x] Poor, unable to tolerate any food or drink with nausea/vomiting x 4-5 days PTA and poor to fair meal intake before then for the last several months     [] Other:  Feeding Limitations:  [] Swallowing difficulty    [] Chewing difficulty    [] Other:  Current Meal Intake: No data found. BM: 1/25, colostomy   Skin Integrity: WDL  Edema: none   Pertinent Medications: Reviewed: phenergan, thiamine, KCl    Recent Labs      01/28/18   0341  01/27/18   1915  01/27/18   1230  01/27/18   0307  01/26/18   1540   NA  145   --    --   148*  148*   K  3.4*  4.1  3.0*  3.3*  3.3*   CL  113*   --    --   115*  112*   CO2  27   --    --   28  34*   GLU  83   --    --   97  107*   BUN  6*   --    --   15  20*   CREA  0.48*   --    --   0.59*  0.55*   CA  7.3*   --    --   8.0*  8.5   MG   --   1.7   --   1.9   --        Intake/Output Summary (Last 24 hours) at 01/28/18 1324  Last data filed at 01/28/18 0552   Gross per 24 hour   Intake          2503.34 ml   Output             1725 ml   Net           778.34 ml       Anthropometrics:  Ht Readings from Last 1 Encounters:   01/24/18 5' 9\" (1.753 m)     Last 3 Recorded Weights in this Encounter    01/24/18 9387 01/27/18 0400   Weight: 64 kg (141 lb) 66.3 kg (146 lb 3.2 oz)     Body mass index is 21.59 kg/(m^2).           Weight History: per patient and family- significant weight loss, down from previous weight of 165 lb in September 2017 (24 lb, 15% weight loss x 4 months)    Weight Metrics 1/27/2018 1/24/2018 1/8/2018 12/6/2017 9/28/2017 9/19/2017 6/7/2017   Weight 146 lb 3.2 oz - 146 lb 150 lb 164 lb 164 lb 165 lb   BMI - 21.59 kg/m2 21.56 kg/m2 22.15 kg/m2 24.22 kg/m2 24.22 kg/m2 24.37 kg/m2        Admitting Diagnosis: SBO (small bowel obstruction)  Abdominal pain  Bladder cancer (HCC)  Metastatic disease (HCC)  Intractable abdominal pain  DX  Pertinent PMHx: metastatic bladder cancer, DM, chronic constipation, alcohol abuse, chemotherapy induced neuropathy    Education Needs:        [x] None identified  [] Identified - Not appropriate at this time  []  Identified and addressed - refer to education log  Learning Limitations:   [x] None identified  [] Identified    Cultural, Rastafarian & ethnic food preferences:  [x] None identified    [] Identified and addressed     ESTIMATED NUTRITION NEEDS:     Calories: 1604-7406 kcal (HBEx1.2-1.4) based on  [x] Actual BW 64 kg     [] IBW   Protein: 64-96 gm (1-1.5 gm/kg) based on  [x] Actual BW      [] IBW   Fluid: 1 mL/kcal     MONITORING & EVALUATION:     Nutrition Goal(s):   1. Nutritional needs will be met through adequate oral intake or nutrition support within the next 7 days.   Outcome:  [] Met/Ongoing    [x]  Not Met    [] New/Initial Goal     Monitoring:   [] Food and beverage intake   [x] Diet order   [x] Nutrition-focused physical findings   [x] Treatment/therapy   [] Weight   [] Enteral nutrition intake        Previous Recommendations (for follow-up assessments only):     []   Implemented       [x]   Not Implemented (RD to address)      [] No Longer Appropriate     [] No Recommendation Made     Discharge Planning: pending ability to tolerate po and goals of care  [x] Participated in care planning, discharge planning, & interdisciplinary rounds as appropriate      Jackie Carrillo, [address]   Pager: [phone]

## 2018-01-28 NOTE — PROGRESS NOTES
Problem: Mobility Impaired (Adult and Pediatric)  Goal: *Acute Goals and Plan of Care (Insert Text)  Physical Therapy Goals  Initiated 1/25/2018 and to be accomplished within 7 day(s)  1. Patient will move from supine to sit and sit to supine, scoot up and down and roll side to side in bed with supervision/set-up. 2.  Patient will transfer from bed to chair and chair to bed with supervision/set-up using the least restrictive device. 3.  Patient will perform sit to stand with supervision/set-up. 4.  Patient will ambulate with supervision/set-up for >50 feet with the least restrictive device. 5.  Patient will ascend/descend 2 stairs with 1 handrail(s) with supervision/set-up. 1106 - Pt supine in bed, without clothing, resting and in wrist restraints. Pt was very easy to rouse and immediately pt began movement and attempting transfer out of bed. Before PTA began to undo wrist restraints it was noticed that pt had open colotomy bag and had spread contents over bed area. Pt informed nursing of pt situation. Will f/u with pt as they become available.     CARRINGTON Humphrey

## 2018-01-28 NOTE — PROGRESS NOTES
Brief Progress Note    Patient has been NPO since admission 1/25/2017 for acute bowel obstruction, now POD 3 s/p loop colostomy. Patient was entering hospice for metastatic bladder cancer in the outpatient setting. Dietician paged regarding nutrition concerns, discussed with Dr. Milad Lock, Dr. Yan Irving, and dietary. Patient not a candidate for TPN at this time, has not yet been able to resume diet due to AMS likely secondary to delirium/EtOH withdrawal    Will order speech and swallow eval     Also noted low potassium and magnesium. Fluids changed to D5 1/2 NS with 20 meq KCL at 100 ccs/hr and magnesium repleted with 2 g IV over 1 hr x 2. Orders for daily banana bag added, patient has also been receiving IV thiamine therapy   Discussed with Dr. Yan Irving.     To bedside to check patient, he is mildly agitated but able to be redirected. Appears more comfortable this AM. Sitter at bedside. Patient still requires restraints due to pulling at lines and picking at colostomy. Nurse returned to floor with new colostomy bag, ordered to replace and clean colostomy site. Awaiting urology consult for bladder cancer and blood in urine with winslow catheter. Called and spoke with provider on call Dr. Ghada Lujan, who stated if not urgent call in the AM and Dr. Ayo Sinclair will see in the AM. Currently patient continues to have blood in urine but has stable creatinine and UOP. Patient will need PM check and close monitoring throughout the night. He will continue to require a sitter at this time. Urology consult tomorrow AM. Keep in close contact with family.      Urban Hale MD  01/28/18  3:40 PM

## 2018-01-28 NOTE — PROGRESS NOTES
Intern Progress Note  [facility]       Patient: Sowmya Frey MRN: [id number]  CSN: [id number]    YOB: 1955  Age: 57 y.o. Sex: male    DOA: 1/24/2018 LOS:  LOS: 4 days                    Subjective:     Acute events: Pt resting in bed. Alert and oriented to person and year, not place. Attempting to squeeze hands out of restraints. Explained surgery and why restraints were necessary. Pain controlled. No N/V. Stoma still leaking brown stool. Review of Systems   Constitutional: Negative for chills and fever. Respiratory: Negative for shortness of breath. Cardiovascular: Negative for chest pain, palpitations and leg swelling. Gastrointestinal: Negative for abdominal pain, nausea and vomiting. Neurological: Negative for dizziness and headaches. Objective:      Patient Vitals for the past 24 hrs:   Temp Pulse Resp BP SpO2   01/28/18 0725 97.3 °F (36.3 °C) 71 18 138/75 99 %   01/28/18 0236 98 °F (36.7 °C) 84 18 155/78 96 %   01/28/18 0041 98.2 °F (36.8 °C) 84 20 157/80 96 %   01/27/18 2021 98.4 °F (36.9 °C) 61 20 159/84 97 %   01/27/18 1637 98.2 °F (36.8 °C) 82 20 141/78 94 %   01/27/18 1134 97.9 °F (36.6 °C) 75 20 138/76 98 %         Intake/Output Summary (Last 24 hours) at 01/28/18 0833  Last data filed at 01/28/18 6802   Gross per 24 hour   Intake          2503.34 ml   Output             2425 ml   Net            78.34 ml       Physical Exam   Constitutional: He appears cachectic. He is cooperative. No distress. HENT:   Head: Normocephalic and atraumatic. Eyes: EOM are normal. Pupils are equal, round, and reactive to light. Neck: Normal range of motion. Neck supple. Cardiovascular: Normal rate, regular rhythm, normal heart sounds and intact distal pulses. Pulmonary/Chest: Effort normal and breath sounds normal. No respiratory distress. He has no wheezes. Abdominal: Soft. He exhibits no distension. There is no tenderness.    Brown stool present around stoma site. No signs of infection   Neurological: He is alert. Oriented to person and year   Psychiatric: He has a normal mood and affect. His behavior is normal.       Lab/Data Reviewed:  Recent Results (from the past 24 hour(s))   POTASSIUM    Collection Time: 01/27/18 12:30 PM   Result Value Ref Range    Potassium 3.0 (L) 3.5 - 5.5 mmol/L   POTASSIUM    Collection Time: 01/27/18  7:15 PM   Result Value Ref Range    Potassium 4.1 3.5 - 5.5 mmol/L   MAGNESIUM    Collection Time: 01/27/18  7:15 PM   Result Value Ref Range    Magnesium 1.7 1.6 - 2.6 mg/dL   CBC WITH AUTOMATED DIFF    Collection Time: 01/28/18  3:41 AM   Result Value Ref Range    WBC 6.5 4.6 - 13.2 K/uL    RBC 3.82 (L) 4.70 - 5.50 M/uL    HGB 12.4 (L) 13.0 - 16.0 g/dL    HCT 36.7 36.0 - 48.0 %    MCV 96.1 74.0 - 97.0 FL    MCH 32.5 24.0 - 34.0 PG    MCHC 33.8 31.0 - 37.0 g/dL    RDW 13.0 11.6 - 14.5 %    PLATELET 706 620 - 385 K/uL    MPV 10.0 9.2 - 11.8 FL    NEUTROPHILS 62 40 - 73 %    LYMPHOCYTES 18 (L) 21 - 52 %    MONOCYTES 10 3 - 10 %    EOSINOPHILS 9 (H) 0 - 5 %    BASOPHILS 1 0 - 2 %    ABS. NEUTROPHILS 4.1 1.8 - 8.0 K/UL    ABS. LYMPHOCYTES 1.2 0.9 - 3.6 K/UL    ABS. MONOCYTES 0.6 0.05 - 1.2 K/UL    ABS. EOSINOPHILS 0.6 (H) 0.0 - 0.4 K/UL    ABS.  BASOPHILS 0.0 0.0 - 0.1 K/UL    DF AUTOMATED     METABOLIC PANEL, BASIC    Collection Time: 01/28/18  3:41 AM   Result Value Ref Range    Sodium 145 136 - 145 mmol/L    Potassium 3.4 (L) 3.5 - 5.5 mmol/L    Chloride 113 (H) 100 - 108 mmol/L    CO2 27 21 - 32 mmol/L    Anion gap 5 3.0 - 18 mmol/L    Glucose 83 74 - 99 mg/dL    BUN 6 (L) 7.0 - 18 MG/DL    Creatinine 0.48 (L) 0.6 - 1.3 MG/DL    BUN/Creatinine ratio 13 12 - 20      GFR est AA >60 >60 ml/min/1.73m2    GFR est non-AA >60 >60 ml/min/1.73m2    Calcium 7.3 (L) 8.5 - 10.1 MG/DL       Scheduled Medications Reviewed:  Current Facility-Administered Medications   Medication Dose Route Frequency    potassium chloride 10 mEq, lidocaine (PF) (XYLOCAINE) 10 mg/mL (1 %) 1 mL in 0.9% sodium chloride 100 mL IVPB   IntraVENous Q1H    dextrose 5 % - 0.45% NaCl infusion  100 mL/hr IntraVENous CONTINUOUS    famotidine (PF) (PEPCID) injection 20 mg  20 mg IntraVENous DAILY    thiamine (B-1) 50 mg in 0.9% sodium chloride 50 mL IVPB  50 mg IntraVENous DAILY    ketorolac (TORADOL) injection 15 mg  15 mg IntraVENous Q8H    ampicillin-sulbactam (UNASYN) 3 g in 0.9% sodium chloride (MBP/ADV) 100 mL MBP  3 g IntraVENous Q6H    enoxaparin (LOVENOX) injection 40 mg  40 mg SubCUTAneous Q24H    nicotine (NICODERM CQ) 14 mg/24 hr patch 1 Patch  1 Patch TransDERmal DAILY    sodium chloride (NS) flush 5-10 mL  5-10 mL IntraVENous Q8H         Imaging, microbiology, and EKG/Telemetry:      Assessment/Plan     Kavita Lund is a 56 y/o male with PMHx of bladder CA with metastases to the lungs and spine, bowel obstruction, and HLD who is now admitted with bowel obstruction, and for intractable pain, nausea and vomiting. He is post-op day 2 from loop colostomy.      S/P Loop Colostomy for Bowel Obstruction 2/2 metastasis vs post-radiatio changes vs stricture-  Patient has a h/o metatastic bladder cancer. Dr. Miguel Angel Barney performed loop colostomy without problems on 1/25/18. Pt producing liquid stool now, stool is leaking around colostomy site;   - General surgery following, POD #3 s/p loop colostomy  - Surgery to manage leaking colostomy site   - Continue D5 1/2 NS @ 100 cc/hr, reassess daily  - Dilaudid 1mg IV q3h PRN  - IV phenergan 25 mg q6h PRN for nausea/vomiting  - Diet per surgery, originally NPO for post-op, now NPO for mental status.  Plan to advance diet once pt's mental status improves  - Concern for superficial surgical site infection; pt is covered with Unasyn; surgery to manage      Acute Delirium/Alcohol Withdrawal CIWA scores 7 overnight  - CIWA Protocol discontinued due to oversedation   - Continue Ativan 1 mg q6hrs PRN   - Continue restraints, reassess q12 hrs        Malignant Bladder Cancer/Urinary Incontinence- Followed by Dr. Tyson Cai (Urology), Dr. Dennis Salazar (Heme-Onc), & Dr. Virginia Jeffers (Radiation Onc) as outpatient. - Heme-Onc consulted   - Urology consulted   - Palliative Care consulted   - Hold home oxybutynin XL 10 mg daily while NPO  - Once medically stable, acute delirium improves, Pt to go home on hospice; see hospice note from 1/25       Hypokalemia  - K 3.6>2.8>3.1>3.3>3.4  - Monitor BMP, replete as necessary     Hypernatremia  - Na [id number]>994  - Continue D5 1/2 NS @ 100mL/hr      Chemotherapy-induced Neuropathy  - Hold home gabapentin 600 mg TID while NPO      Chronic Constipation: Colonoscopy (1/08/18) showed congestion and scarring in the rectum and large load of fecal impaction noted proximal to area of stricture.   - s/p loop colostomy      Nicotine Dependence  - Patient has a 43 pack year history and currently smokes about 1 ppd  - Nicotine patch, 14 mg/24 hrs         Diet: NPO per surgery, will consider advancing to clear liquids today   DVT Prophylaxis: Lovenox  Code Status: DNR  Point of Contact: Ricarda Kaur, [phone], Sister   Karlie Mcgrath, [phone], Brother-in-Law      Disposition and anticipated LOS: >2 midnights    Mary Gallegos MD, PGY-1  [facility]  01/28/18 8:33 AM

## 2018-01-28 NOTE — PROGRESS NOTES
1905    Bedside turnover given to me by GIA Miguel pt is on the cardiac telemetry monitor, he has a friend at bedside, he is moaning a little bit. I asked about him having any pain and he said no. 2015  Patient's visitor came out of the room and stated \"He is moving around a lot in the bed and shaking his legs like he is in pain\"  2019 Ativan given to help him calm down  20124 Dilaudid given for pain   2050 patient tugging at the colostomy bag, wrist restraints tightened, no abnormalities, still  Two finger spaces between restraint and patient's skin. 2110 pt falling asleep  2145 meds given, patient is sleeping in bed, randomly he moans and moves his legs around however he looks comfortable. Potassium infusing with  D45 NS 0.  0015 patient awake yelling and trying to remove his restraints stating \"Golly Golly\"    Bedside turnover given to GIA Diaz July pt is on the cardiac telemetry monitor, in bed. SBAR, MAR, ED summary and updates from the night shift with information regarding ativan and dilaudid related to pain control.

## 2018-01-28 NOTE — PROGRESS NOTES
Hematology/Medical Oncology Progress Note      NAME: Tami Muniz   :  1955  MRM:  [id number]    Date/Time: 2018  9:08 AM         Subjective:     Mr Manuel Vo is a 57 y.o. Man with a history of metastatic bladder cancer. He was admitted with abdominal pain and found to have  Colonic obstruction. He underwent a laparotomy and his ostomy output is now good. He remains confused, to a lesser degree today, likely related to alcohol withdrawal symptoms. There are no complaints of pain. Past Medical History reviewed and unchanged from Admission History and Physical    Review of Systems   Constitutional: Positive for chills and fatigue. Negative for activity change, appetite change, diaphoresis, fever and unexpected weight change. HENT: Negative for congestion, facial swelling, mouth sores, nosebleeds, postnasal drip, trouble swallowing and voice change. Eyes: Negative for photophobia, pain, discharge and itching. Respiratory: Negative for apnea, cough, choking, chest tightness, wheezing and stridor. Cardiovascular: Negative for chest pain, palpitations and leg swelling. Gastrointestinal: Negative for abdominal pain, blood in stool, constipation, diarrhea, nausea and rectal pain. Genitourinary: Negative for difficulty urinating, dysuria, flank pain, hematuria and urgency. Musculoskeletal: Negative for arthralgias, back pain, gait problem, joint swelling, neck pain and neck stiffness. Skin: Negative for color change, pallor, rash and wound. Neurological: Negative for dizziness, facial asymmetry, speech difficulty, weakness, light-headedness and headaches. Hematological: Negative for adenopathy. Does not bruise/bleed easily. Psychiatric/Behavioral: Negative for agitation, confusion, hallucinations and sleep disturbance. Objective:       Vitals:      Last 24hrs VS reviewed since prior progress note.  Most recent are:    Visit Vitals    /75    Pulse 71    Temp 97.3 °F (36.3 °C)    Resp 18    Ht 5' 9\" (1.753 m)    Wt 66.3 kg (146 lb 3.2 oz)    SpO2 99%    BMI 21.59 kg/m2     SpO2 Readings from Last 6 Encounters:   01/28/18 99%   01/08/18 97%   01/23/17 100%   06/11/16 99%   03/03/16 99%   10/01/15 100%    O2 Flow Rate (L/min): 2 l/min       Intake/Output Summary (Last 24 hours) at 01/28/18 0909  Last data filed at 01/28/18 4847   Gross per 24 hour   Intake          2503.34 ml   Output             2425 ml   Net            78.34 ml          Exam:      Physical Exam   Nursing note and vitals reviewed. Constitutional: He is oriented to person, place, and time. He appears well-developed and well-nourished. No distress. HENT:   Head: Normocephalic and atraumatic. Mouth/Throat: No oropharyngeal exudate. Eyes: Conjunctivae and EOM are normal. Pupils are equal, round, and reactive to light. Right eye exhibits no discharge. Left eye exhibits no discharge. No scleral icterus. Neck: Normal range of motion. Neck supple. No tracheal deviation present. No thyromegaly present. Cardiovascular: Normal rate and regular rhythm. Exam reveals no gallop and no friction rub. No murmur heard. Pulmonary/Chest: Effort normal and breath sounds normal. No apnea. No respiratory distress. He has no wheezes. He has no rales. Chest wall is not dull to percussion. He exhibits no mass, no tenderness, no bony tenderness, no laceration, no crepitus, no edema, no deformity, no swelling and no retraction. Right breast exhibits no inverted nipple, no mass, no nipple discharge, no skin change and no tenderness. Left breast exhibits no inverted nipple, no mass, no nipple discharge, no skin change and no tenderness. Breasts are symmetrical.   Abdominal: Soft. Bowel sounds are normal. He exhibits no distension. There is no tenderness. There is no rebound and no guarding. Musculoskeletal: Normal range of motion. He exhibits no edema or tenderness.    Lymphadenopathy:     He has no cervical adenopathy. Neurological: He is alert and oriented to person, place, and time. Coordination normal.   Skin: Skin is warm and dry. No rash noted. He is not diaphoretic. No erythema. No pallor.    Psychiatric:   Confused       Telemetry reviewed:   normal sinus rhythm      Lab Data Reviewed: (see below)      Medications:  Current Facility-Administered Medications   Medication Dose Route Frequency    HYDROmorphone (PF) (DILAUDID) injection 1 mg  1 mg IntraVENous Q3H PRN    potassium chloride 10 mEq, lidocaine (PF) (XYLOCAINE) 10 mg/mL (1 %) 1 mL in 0.9% sodium chloride 100 mL IVPB   IntraVENous Q1H    LORazepam (ATIVAN) injection 1 mg  1 mg IntraVENous Q6H PRN    dextrose 5 % - 0.45% NaCl infusion  100 mL/hr IntraVENous CONTINUOUS    famotidine (PF) (PEPCID) injection 20 mg  20 mg IntraVENous DAILY    thiamine (B-1) 50 mg in 0.9% sodium chloride 50 mL IVPB  50 mg IntraVENous DAILY    sodium chloride (NS) flush 5-10 mL  5-10 mL IntraVENous PRN    ketorolac (TORADOL) injection 15 mg  15 mg IntraVENous Q8H    ampicillin-sulbactam (UNASYN) 3 g in 0.9% sodium chloride (MBP/ADV) 100 mL MBP  3 g IntraVENous Q6H    enoxaparin (LOVENOX) injection 40 mg  40 mg SubCUTAneous Q24H    nicotine (NICODERM CQ) 14 mg/24 hr patch 1 Patch  1 Patch TransDERmal DAILY    sodium chloride (NS) flush 5-10 mL  5-10 mL IntraVENous Q8H    sodium chloride (NS) flush 5-10 mL  5-10 mL IntraVENous PRN    naloxone (NARCAN) injection 0.4 mg  0.4 mg IntraVENous EVERY 2 MINUTES AS NEEDED    promethazine (PHENERGAN) 25 mg in 0.9% sodium chloride 50 mL IVPB  25 mg IntraVENous Q6H PRN    sodium chloride (NS) flush 5-10 mL  5-10 mL IntraVENous PRN       ______________________________________________________________________      Lab Review:     Recent Labs      01/28/18   0341  01/27/18   0307  01/26/18   0425   WBC  6.5  6.9  10.5   HGB  12.4*  13.8  14.8   HCT  36.7  40.4  43.2   PLT  215  220  261     Recent Labs      01/28/18   0341 01/27/18   1915  01/27/18   1230  01/27/18   0307  01/26/18   1540   NA  145   --    --   148*  148*   K  3.4*  4.1  3.0*  3.3*  3.3*   CL  113*   --    --   115*  112*   CO2  27   --    --   28  34*   GLU  83   --    --   97  107*   BUN  6*   --    --   15  20*   CREA  0.48*   --    --   0.59*  0.55*   CA  7.3*   --    --   8.0*  8.5   MG   --   1.7   --   1.9   --    INR   --    --    --   1.2   --      No components found for: GLPOC  No results for input(s): PH, PCO2, PO2, HCO3, FIO2 in the last 72 hours. Recent Labs      01/27/18   0307   INR  1.2       Other pertinent lab:          Assessment:     Principal Problem: Bowel obstruction (1/24/2018)    Active Problems:    Bladder cancer ([facility] (8/7/2013)      Nicotine dependence (3/27/2009)      Metastatic disease ([address] (1/24/2018)      Intractable abdominal pain (1/24/2018)      Chemotherapy-induced neuropathy ([facility] [address] (1/24/2018)      Alcohol abuse (1/24/2018)           Plan:     Risk of deterioration: medium             1. Bowel obstruction: status post surgical intervention. Followed by surgery. 2. Metastatic bladder cancer: not a candidate for any additional systemic therapy. Hospice is recommended. Total time spent with patient:20 minutes                  Care Plan discussed with: Nursing Staff and Consultant/Specialist    Discussed:  Care Plan    Prophylaxis:  Lovenox    Disposition:   To be dedetmined           ___________________________________________________    Attending Physician: Edmond Gaona MD

## 2018-01-28 NOTE — PROGRESS NOTES
Spoke with hospice nurse, will visit tomorrow. She reported that she spoke with patient on the phone on day of admission and he was in 50/10 pain and unable to hold conversation so she instructed him to come to the hospital. Says patient is normally more coherent, was unaware about EtOH history. I informed her that patient is likely withdrawing and that is complicating his admission and recovery from surgery. Nurse recommended adding Haldol for agitation and delirium if ativan not working well enough. Agreed with pain management plan and will follow. During rounds, pt found sitting up in bed, no clothes, uncovered, with ostomy bag opened and stool spread over bed. Pt delirious, looking much worse than during pre-rounds. Ativan changed to q4h PRN, haldol IM q4h PRN added. Sitter ordered again. Case management consulted.     Adonay Cramer MD, PGY-1  [facility]  01/28/18 12:21 PM

## 2018-01-28 NOTE — PROGRESS NOTES
Dinesh Potts M.D. FACS  PROGRESS NOTE    Name: Kristofer Faith MRN: [id number]   : 1955 [facility]   Date: 2018 Admission Date: 2018  2:12 PM     Hospital Day: 5  3 Days Post-Op  Subjective:  Pt somnolent. He continues to pull of the stomal appliance. Objective:  Vitals:    18 0041 18 0236 18 0725 18 1117   BP: 157/80 155/78 138/75 167/76   Pulse: 84 84 71 71   Resp:    Temp: 98.2 °F (36.8 °C) 98 °F (36.7 °C) 97.3 °F (36.3 °C) 97.7 °F (36.5 °C)   SpO2: 96% 96% 99% 97%   Weight:       Height:           Date 18 0700 - 18 0659 18 0700 - 18 0659   Shift 1076-0535 8710-4716 24 Hour Total 6934-6600 8520-6298 24 Hour Total   I  N  T  A  K  E   P. O.  0 0         P. O.  0 0       I.V.  (mL/kg/hr) 1450  (1.8) 1053.3  (1.3) 2503.3  (1.6)         Volume (dextrose 5 % - 0.45% NaCl infusion) 1200 953.3 2153.3         Volume (ampicillin-sulbactam (UNASYN) 3 g in 0.9% sodium chloride (MBP/ADV) 100 mL MBP) 200 100 300         Volume (thiamine (B-1) 50 mg in 0.9% sodium chloride 50 mL IVPB) 50 0 50         Volume (promethazine (PHENERGAN) 25 mg in 0.9% sodium chloride 50 mL IVPB)  0 0       Shift Total  (mL/kg) 1450  (21.9) 1053.3  (15.9) 2503.3  (37.7)      O  U  T  P  U  T   Urine  (mL/kg/hr) 1300  (1.6) 1125  (1.4) 2425  (1.5)         Urine Voided  1125 1125         Urine Output (mL) (Urinary Catheter 18 Patel) 1300  1300       Stool            Stool Occurrence(s) 1 x  1 x       Shift Total  (mL/kg) 1300  (19.6) 1125  (17) 2425  (36.6)       -71.7 78.3      Weight (kg) 66.3 66.3 66.3 66.3 66.3 66.3         Physical Exam:    General: confused   Abdomen: abdomen is soft with LLQ incisional tenderness. Incision(s) are C/D/I. Irritation around the colostomy site.   No masses, organomegaly or guarding    Labs:  Recent Results (from the past 24 hour(s))   POTASSIUM    Collection Time: 18  7:15 PM Result Value Ref Range    Potassium 4.1 3.5 - 5.5 mmol/L   MAGNESIUM    Collection Time: 01/27/18  7:15 PM   Result Value Ref Range    Magnesium 1.7 1.6 - 2.6 mg/dL   CBC WITH AUTOMATED DIFF    Collection Time: 01/28/18  3:41 AM   Result Value Ref Range    WBC 6.5 4.6 - 13.2 K/uL    RBC 3.82 (L) 4.70 - 5.50 M/uL    HGB 12.4 (L) 13.0 - 16.0 g/dL    HCT 36.7 36.0 - 48.0 %    MCV 96.1 74.0 - 97.0 FL    MCH 32.5 24.0 - 34.0 PG    MCHC 33.8 31.0 - 37.0 g/dL    RDW 13.0 11.6 - 14.5 %    PLATELET 479 850 - 857 K/uL    MPV 10.0 9.2 - 11.8 FL    NEUTROPHILS 62 40 - 73 %    LYMPHOCYTES 18 (L) 21 - 52 %    MONOCYTES 10 3 - 10 %    EOSINOPHILS 9 (H) 0 - 5 %    BASOPHILS 1 0 - 2 %    ABS. NEUTROPHILS 4.1 1.8 - 8.0 K/UL    ABS. LYMPHOCYTES 1.2 0.9 - 3.6 K/UL    ABS. MONOCYTES 0.6 0.05 - 1.2 K/UL    ABS. EOSINOPHILS 0.6 (H) 0.0 - 0.4 K/UL    ABS.  BASOPHILS 0.0 0.0 - 0.1 K/UL    DF AUTOMATED     METABOLIC PANEL, BASIC    Collection Time: 01/28/18  3:41 AM   Result Value Ref Range    Sodium 145 136 - 145 mmol/L    Potassium 3.4 (L) 3.5 - 5.5 mmol/L    Chloride 113 (H) 100 - 108 mmol/L    CO2 27 21 - 32 mmol/L    Anion gap 5 3.0 - 18 mmol/L    Glucose 83 74 - 99 mg/dL    BUN 6 (L) 7.0 - 18 MG/DL    Creatinine 0.48 (L) 0.6 - 1.3 MG/DL    BUN/Creatinine ratio 13 12 - 20      GFR est AA >60 >60 ml/min/1.73m2    GFR est non-AA >60 >60 ml/min/1.73m2    Calcium 7.3 (L) 8.5 - 10.1 MG/DL   POTASSIUM    Collection Time: 01/28/18  1:08 PM   Result Value Ref Range    Potassium 3.1 (L) 3.5 - 5.5 mmol/L   MAGNESIUM    Collection Time: 01/28/18  1:08 PM   Result Value Ref Range    Magnesium 1.4 (L) 1.6 - 2.6 mg/dL     All Micro Results     None          Current Medications:  Current Facility-Administered Medications   Medication Dose Route Frequency Provider Last Rate Last Dose    HYDROmorphone (PF) (DILAUDID) injection 1 mg  1 mg IntraVENous Q3H PRN Celeste Ill, MBBS   1 mg at 01/28/18 0913    potassium chloride 10 mEq, lidocaine (PF) (XYLOCAINE) 10 mg/mL (1 %) 1 mL in 0.9% sodium chloride 100 mL IVPB   IntraVENous Q1H Anuja Malloy MD        LORazepam (ATIVAN) injection 1 mg  1 mg IntraVENous Q4H PRN Anuja Malloy MD        haloperidol lactate (HALDOL) injection 2 mg  2 mg IntraMUSCular Q4H PRN Anuja Malloy MD   5 mg at 01/28/18 1232    dextrose 5 % - 0.45% NaCl infusion  100 mL/hr IntraVENous CONTINUOUS Anuja Malloy  mL/hr at 01/28/18 0555 100 mL/hr at 01/28/18 0555    famotidine (PF) (PEPCID) injection 20 mg  20 mg IntraVENous DAILY Jolynn Garcia MD   20 mg at 01/28/18 0912    thiamine (B-1) 50 mg in 0.9% sodium chloride 50 mL IVPB  50 mg IntraVENous DAILY Jolynn Garcia MD   50 mg at 01/28/18 0847    sodium chloride (NS) flush 5-10 mL  5-10 mL IntraVENous PRN London Holt DO        ketorolac (TORADOL) injection 15 mg  15 mg IntraVENous Q8H Alice Aguayo MD   15 mg at 01/28/18 1233    ampicillin-sulbactam (UNASYN) 3 g in 0.9% sodium chloride (MBP/ADV) 100 mL MBP  3 g IntraVENous Q6H Alice Aguayo  mL/hr at 01/28/18 0924 3 g at 01/28/18 0924    enoxaparin (LOVENOX) injection 40 mg  40 mg SubCUTAneous Q24H London Holt DO   40 mg at 01/27/18 2027    nicotine (NICODERM CQ) 14 mg/24 hr patch 1 Patch  1 Patch TransDERmal DAILY Tyler Freeman MD   1 Patch at 01/28/18 0912    sodium chloride (NS) flush 5-10 mL  5-10 mL IntraVENous Q8H Tyler Freeman MD   10 mL at 01/28/18 1233    sodium chloride (NS) flush 5-10 mL  5-10 mL IntraVENous PRN Tyler Freeman MD   10 mL at 01/27/18 2041    naloxone (NARCAN) injection 0.4 mg  0.4 mg IntraVENous EVERY 2 MINUTES AS NEEDED Tyler Freeman MD        promethazine (PHENERGAN) 25 mg in 0.9% sodium chloride 50 mL IVPB  25 mg IntraVENous Q6H PRN Tyler Freeman MD   25 mg at 01/28/18 0236    sodium chloride (NS) flush 5-10 mL  5-10 mL IntraVENous PRN London Holt DO           Chart and notes reviewed. Data reviewed.  I have evaluated and examined the patient. IMPRESSION:   · Pt is POD 3 from diverting loop descending colostomy.        PLAN:/DISCUSION:   · Soft restraints and mittens to keep the patient from pulling off the colostomy appliance  · Continue toward hospice and comfort care        Rene Lopez MD

## 2018-01-29 LAB
ANION GAP SERPL CALC-SCNC: 7 MMOL/L (ref 3–18)
ATRIAL RATE: 58 BPM
BASOPHILS # BLD: 0 K/UL (ref 0–0.1)
BASOPHILS NFR BLD: 1 % (ref 0–2)
BUN SERPL-MCNC: 4 MG/DL (ref 7–18)
BUN/CREAT SERPL: 10 (ref 12–20)
CALCIUM SERPL-MCNC: 7.3 MG/DL (ref 8.5–10.1)
CALCULATED P AXIS, ECG09: 44 DEGREES
CALCULATED R AXIS, ECG10: 71 DEGREES
CALCULATED T AXIS, ECG11: 73 DEGREES
CHLORIDE SERPL-SCNC: 113 MMOL/L (ref 100–108)
CK MB CFR SERPL CALC: 1 % (ref 0–4)
CK MB CFR SERPL CALC: 1 % (ref 0–4)
CK MB SERPL-MCNC: 2 NG/ML (ref 5–25)
CK MB SERPL-MCNC: 2.6 NG/ML (ref 5–25)
CK SERPL-CCNC: 208 U/L (ref 39–308)
CK SERPL-CCNC: 259 U/L (ref 39–308)
CO2 SERPL-SCNC: 24 MMOL/L (ref 21–32)
CREAT SERPL-MCNC: 0.42 MG/DL (ref 0.6–1.3)
DIAGNOSIS, 93000: NORMAL
DIFFERENTIAL METHOD BLD: ABNORMAL
EOSINOPHIL # BLD: 0.6 K/UL (ref 0–0.4)
EOSINOPHIL NFR BLD: 11 % (ref 0–5)
ERYTHROCYTE [DISTWIDTH] IN BLOOD BY AUTOMATED COUNT: 12.7 % (ref 11.6–14.5)
GLUCOSE BLD STRIP.AUTO-MCNC: 128 MG/DL (ref 70–110)
GLUCOSE BLD STRIP.AUTO-MCNC: 95 MG/DL (ref 70–110)
GLUCOSE SERPL-MCNC: 85 MG/DL (ref 74–99)
HCT VFR BLD AUTO: 39.7 % (ref 36–48)
HGB BLD-MCNC: 13.7 G/DL (ref 13–16)
LYMPHOCYTES # BLD: 0.8 K/UL (ref 0.9–3.6)
LYMPHOCYTES NFR BLD: 15 % (ref 21–52)
MAGNESIUM SERPL-MCNC: 2.2 MG/DL (ref 1.6–2.6)
MCH RBC QN AUTO: 32.2 PG (ref 24–34)
MCHC RBC AUTO-ENTMCNC: 34.5 G/DL (ref 31–37)
MCV RBC AUTO: 93.2 FL (ref 74–97)
MONOCYTES # BLD: 0.5 K/UL (ref 0.05–1.2)
MONOCYTES NFR BLD: 8 % (ref 3–10)
NEUTS SEG # BLD: 3.8 K/UL (ref 1.8–8)
NEUTS SEG NFR BLD: 65 % (ref 40–73)
P-R INTERVAL, ECG05: 126 MS
PLATELET # BLD AUTO: 207 K/UL (ref 135–420)
PMV BLD AUTO: 10 FL (ref 9.2–11.8)
POTASSIUM SERPL-SCNC: 3.1 MMOL/L (ref 3.5–5.5)
POTASSIUM SERPL-SCNC: 3.2 MMOL/L (ref 3.5–5.5)
Q-T INTERVAL, ECG07: 450 MS
QRS DURATION, ECG06: 80 MS
QTC CALCULATION (BEZET), ECG08: 441 MS
RBC # BLD AUTO: 4.26 M/UL (ref 4.7–5.5)
SODIUM SERPL-SCNC: 144 MMOL/L (ref 136–145)
TROPONIN I SERPL-MCNC: 0.02 NG/ML (ref 0–0.04)
TROPONIN I SERPL-MCNC: 0.02 NG/ML (ref 0–0.04)
VENTRICULAR RATE, ECG03: 58 BPM
WBC # BLD AUTO: 5.7 K/UL (ref 4.6–13.2)

## 2018-01-29 PROCEDURE — 76450000000

## 2018-01-29 PROCEDURE — 93005 ELECTROCARDIOGRAM TRACING: CPT

## 2018-01-29 PROCEDURE — 83735 ASSAY OF MAGNESIUM: CPT

## 2018-01-29 PROCEDURE — 74011250636 HC RX REV CODE- 250/636: Performed by: FAMILY MEDICINE

## 2018-01-29 PROCEDURE — 74011250637 HC RX REV CODE- 250/637: Performed by: FAMILY MEDICINE

## 2018-01-29 PROCEDURE — 92526 ORAL FUNCTION THERAPY: CPT

## 2018-01-29 PROCEDURE — 74011000250 HC RX REV CODE- 250: Performed by: STUDENT IN AN ORGANIZED HEALTH CARE EDUCATION/TRAINING PROGRAM

## 2018-01-29 PROCEDURE — 74011000258 HC RX REV CODE- 258: Performed by: FAMILY MEDICINE

## 2018-01-29 PROCEDURE — 85025 COMPLETE CBC W/AUTO DIFF WBC: CPT

## 2018-01-29 PROCEDURE — 74011250637 HC RX REV CODE- 250/637: Performed by: STUDENT IN AN ORGANIZED HEALTH CARE EDUCATION/TRAINING PROGRAM

## 2018-01-29 PROCEDURE — 82550 ASSAY OF CK (CPK): CPT | Performed by: FAMILY MEDICINE

## 2018-01-29 PROCEDURE — 74011000258 HC RX REV CODE- 258: Performed by: STUDENT IN AN ORGANIZED HEALTH CARE EDUCATION/TRAINING PROGRAM

## 2018-01-29 PROCEDURE — 74011250636 HC RX REV CODE- 250/636: Performed by: STUDENT IN AN ORGANIZED HEALTH CARE EDUCATION/TRAINING PROGRAM

## 2018-01-29 PROCEDURE — 82962 GLUCOSE BLOOD TEST: CPT

## 2018-01-29 PROCEDURE — 74011000250 HC RX REV CODE- 250: Performed by: FAMILY MEDICINE

## 2018-01-29 PROCEDURE — 84132 ASSAY OF SERUM POTASSIUM: CPT

## 2018-01-29 PROCEDURE — 97530 THERAPEUTIC ACTIVITIES: CPT

## 2018-01-29 PROCEDURE — 74011250636 HC RX REV CODE- 250/636: Performed by: SURGERY

## 2018-01-29 PROCEDURE — 36415 COLL VENOUS BLD VENIPUNCTURE: CPT

## 2018-01-29 PROCEDURE — 74011000250 HC RX REV CODE- 250: Performed by: EMERGENCY MEDICINE

## 2018-01-29 PROCEDURE — 92610 EVALUATE SWALLOWING FUNCTION: CPT

## 2018-01-29 PROCEDURE — 65660000004 HC RM CVT STEPDOWN

## 2018-01-29 RX ORDER — HALOPERIDOL 5 MG/ML
2 INJECTION INTRAMUSCULAR EVERY 8 HOURS
Status: DISCONTINUED | OUTPATIENT
Start: 2018-01-29 | End: 2018-01-31

## 2018-01-29 RX ORDER — NITROGLYCERIN 0.4 MG/1
0.4 TABLET SUBLINGUAL ONCE
Status: COMPLETED | OUTPATIENT
Start: 2018-01-29 | End: 2018-01-29

## 2018-01-29 RX ADMIN — LIDOCAINE HYDROCHLORIDE: 10 INJECTION, SOLUTION EPIDURAL; INFILTRATION; INTRACAUDAL; PERINEURAL at 02:32

## 2018-01-29 RX ADMIN — HYDROMORPHONE HYDROCHLORIDE 1 MG: 1 INJECTION, SOLUTION INTRAMUSCULAR; INTRAVENOUS; SUBCUTANEOUS at 03:56

## 2018-01-29 RX ADMIN — KETOROLAC TROMETHAMINE 15 MG: 30 INJECTION, SOLUTION INTRAMUSCULAR at 05:31

## 2018-01-29 RX ADMIN — HALOPERIDOL LACTATE 2 MG: 5 INJECTION, SOLUTION INTRAMUSCULAR at 18:56

## 2018-01-29 RX ADMIN — Medication 10 ML: at 22:11

## 2018-01-29 RX ADMIN — ENOXAPARIN SODIUM 40 MG: 40 INJECTION SUBCUTANEOUS at 22:07

## 2018-01-29 RX ADMIN — KETOROLAC TROMETHAMINE 15 MG: 30 INJECTION, SOLUTION INTRAMUSCULAR at 14:14

## 2018-01-29 RX ADMIN — Medication 10 ML: at 14:00

## 2018-01-29 RX ADMIN — LIDOCAINE HYDROCHLORIDE: 10 INJECTION, SOLUTION EPIDURAL; INFILTRATION; INTRACAUDAL; PERINEURAL at 10:40

## 2018-01-29 RX ADMIN — HYDROMORPHONE HYDROCHLORIDE 1 MG: 1 INJECTION, SOLUTION INTRAMUSCULAR; INTRAVENOUS; SUBCUTANEOUS at 11:41

## 2018-01-29 RX ADMIN — LIDOCAINE HYDROCHLORIDE: 10 INJECTION, SOLUTION EPIDURAL; INFILTRATION; INTRACAUDAL; PERINEURAL at 14:35

## 2018-01-29 RX ADMIN — Medication 10 ML: at 05:33

## 2018-01-29 RX ADMIN — LIDOCAINE HYDROCHLORIDE: 10 INJECTION, SOLUTION EPIDURAL; INFILTRATION; INTRACAUDAL; PERINEURAL at 01:05

## 2018-01-29 RX ADMIN — FAMOTIDINE 20 MG: 10 INJECTION INTRAVENOUS at 09:45

## 2018-01-29 RX ADMIN — NITROGLYCERIN 0.4 MG: 0.4 TABLET SUBLINGUAL at 16:00

## 2018-01-29 RX ADMIN — LIDOCAINE HYDROCHLORIDE: 10 INJECTION, SOLUTION EPIDURAL; INFILTRATION; INTRACAUDAL; PERINEURAL at 23:52

## 2018-01-29 RX ADMIN — KETOROLAC TROMETHAMINE 15 MG: 30 INJECTION, SOLUTION INTRAMUSCULAR at 22:04

## 2018-01-29 RX ADMIN — FOLIC ACID: 5 INJECTION, SOLUTION INTRAMUSCULAR; INTRAVENOUS; SUBCUTANEOUS at 00:18

## 2018-01-29 RX ADMIN — LIDOCAINE HYDROCHLORIDE: 10 INJECTION, SOLUTION EPIDURAL; INFILTRATION; INTRACAUDAL; PERINEURAL at 00:17

## 2018-01-29 RX ADMIN — LIDOCAINE HYDROCHLORIDE: 10 INJECTION, SOLUTION EPIDURAL; INFILTRATION; INTRACAUDAL; PERINEURAL at 11:40

## 2018-01-29 RX ADMIN — LORAZEPAM 1 MG: 2 INJECTION INTRAMUSCULAR; INTRAVENOUS at 22:06

## 2018-01-29 RX ADMIN — HALOPERIDOL LACTATE 2 MG: 5 INJECTION, SOLUTION INTRAMUSCULAR at 11:40

## 2018-01-29 RX ADMIN — LIDOCAINE HYDROCHLORIDE: 10 INJECTION, SOLUTION EPIDURAL; INFILTRATION; INTRACAUDAL; PERINEURAL at 13:46

## 2018-01-29 RX ADMIN — LORAZEPAM 1 MG: 2 INJECTION INTRAMUSCULAR; INTRAVENOUS at 14:16

## 2018-01-29 RX ADMIN — HALOPERIDOL LACTATE 2 MG: 5 INJECTION, SOLUTION INTRAMUSCULAR at 01:51

## 2018-01-29 RX ADMIN — LORAZEPAM 1 MG: 2 INJECTION INTRAMUSCULAR; INTRAVENOUS at 06:53

## 2018-01-29 NOTE — PROGRESS NOTES
Dinesh Lezama M.D. FACS  PROGRESS NOTE    Name: Tasha Xiong MRN: [id number]   : 1955 [facility]   Date: 2018 Admission Date: 2018  2:12 PM     Hospital Day: 6  4 Days Post-Op  Subjective:  Pt had swallow eval.  No aspiration. Objective:  Vitals:    18 1635 18 1940 18 2359 18 0444   BP: (!) 166/96 167/83 161/82 157/81   Pulse: 66 78 65 63   Resp:    Temp: 97.7 °F (36.5 °C) 97.8 °F (36.6 °C) 97.8 °F (36.6 °C) 97.6 °F (36.4 °C)   SpO2: 99% 96% 96% 94%   Weight:    66.7 kg (147 lb 1.6 oz)   Height:           Date 18 0700 - 18 0659 18 0700 - 18 0659   Shift 9067-2573 0631-5965 24 Hour Total 6072-6644 2239-7477 24 Hour Total   I  N  T  A  K  E   I.V.  (mL/kg/hr)  100  (0.1) 100  (0.1) 1592.5  1592.5      Volume (0.9% sodium chloride 1,000 mL with mvi, adult no. 4 with vit K 10 mL, thiamine 176 mg, folic acid 1 mg infusion)    837.5  837.5      Volume (dextrose 5% - 0.45% NaCl with KCl 20 mEq/L infusion)    755  755      Volume (ampicillin-sulbactam (UNASYN) 3 g in 0.9% sodium chloride (MBP/ADV) 100 mL MBP)  100 100       Shift Total  (mL/kg)  100  (1.5) 100  (1.5) 1592.5  (23.9)  1592.5  (23.9)   O  U  T  P  U  T   Urine  (mL/kg/hr) 2300  (2.9) 1100  (1.4) 3400  (2.1)         Urine Output (mL) (Urinary Catheter 18 Patel) 2300 1100 3400       Shift Total  (mL/kg) 2300  (34.7) 1100  (16.5) 3400  (51)      NET -2300 -1000 -3300 1592.5  1592.5   Weight (kg) 66.3 66.7 66.7 66.7 66.7 66.7         Physical Exam:    General: Awake, less combative   Abdomen: abdomen is soft with LLQ tenderness. Incision(s) are C/D/I. Stoma is pink.     No masses, organomegaly or guarding    Labs:  Recent Results (from the past 24 hour(s))   POTASSIUM    Collection Time: 18  1:08 PM   Result Value Ref Range    Potassium 3.1 (L) 3.5 - 5.5 mmol/L   MAGNESIUM    Collection Time: 18  1:08 PM   Result Value Ref Range Magnesium 1.4 (L) 1.6 - 2.6 mg/dL   POTASSIUM    Collection Time: 01/28/18  6:00 PM   Result Value Ref Range    Potassium 3.0 (L) 3.5 - 5.5 mmol/L   CBC WITH AUTOMATED DIFF    Collection Time: 01/29/18  5:29 AM   Result Value Ref Range    WBC 5.7 4.6 - 13.2 K/uL    RBC 4.26 (L) 4.70 - 5.50 M/uL    HGB 13.7 13.0 - 16.0 g/dL    HCT 39.7 36.0 - 48.0 %    MCV 93.2 74.0 - 97.0 FL    MCH 32.2 24.0 - 34.0 PG    MCHC 34.5 31.0 - 37.0 g/dL    RDW 12.7 11.6 - 14.5 %    PLATELET 814 171 - 861 K/uL    MPV 10.0 9.2 - 11.8 FL    NEUTROPHILS 65 40 - 73 %    LYMPHOCYTES 15 (L) 21 - 52 %    MONOCYTES 8 3 - 10 %    EOSINOPHILS 11 (H) 0 - 5 %    BASOPHILS 1 0 - 2 %    ABS. NEUTROPHILS 3.8 1.8 - 8.0 K/UL    ABS. LYMPHOCYTES 0.8 (L) 0.9 - 3.6 K/UL    ABS. MONOCYTES 0.5 0.05 - 1.2 K/UL    ABS. EOSINOPHILS 0.6 (H) 0.0 - 0.4 K/UL    ABS. BASOPHILS 0.0 0.0 - 0.1 K/UL    DF AUTOMATED       All Micro Results     None          Current Medications:  Current Facility-Administered Medications   Medication Dose Route Frequency Provider Last Rate Last Dose    HYDROmorphone (PF) (DILAUDID) injection 1 mg  1 mg IntraVENous Q3H PRN DIANE Leo   1 mg at 01/29/18 0356    LORazepam (ATIVAN) injection 1 mg  1 mg IntraVENous Q4H PRN Orion Quintero MD   1 mg at 01/29/18 0653    haloperidol lactate (HALDOL) injection 2 mg  2 mg IntraMUSCular Q4H PRN Orion Quintero MD   2 mg at 01/29/18 0151    0.9% sodium chloride 1,000 mL with mvi, adult no. 4 with vit K 10 mL, thiamine 558 mg, folic acid 1 mg infusion   IntraVENous Q24H Orion Quintero  mL/hr at 01/29/18 0018      dextrose 5% - 0.45% NaCl with KCl 20 mEq/L infusion  100 mL/hr IntraVENous CONTINUOUS Leo Cespedes MD   Stopped at 01/29/18 0015    famotidine (PF) (PEPCID) injection 20 mg  20 mg IntraVENous DAILY Frederich Lefort, MD   20 mg at 01/28/18 0912    sodium chloride (NS) flush 5-10 mL  5-10 mL IntraVENous PRN Dontae Vasquez DO        ketorolac (TORADOL) injection 15 mg  15 mg IntraVENous Q8H Eddie Aguilar MD   15 mg at 01/29/18 0531    enoxaparin (LOVENOX) injection 40 mg  40 mg SubCUTAneous Q24H Magdalena Sanders DO   40 mg at 01/28/18 2313    nicotine (NICODERM CQ) 14 mg/24 hr patch 1 Patch  1 Patch TransDERmal DAILY Solomon Quinn MD   1 Patch at 01/28/18 0912    sodium chloride (NS) flush 5-10 mL  5-10 mL IntraVENous Q8H Solomon Quinn MD   10 mL at 01/29/18 0533    sodium chloride (NS) flush 5-10 mL  5-10 mL IntraVENous PRN Solomon Quinn MD   10 mL at 01/27/18 2041    naloxone (NARCAN) injection 0.4 mg  0.4 mg IntraVENous EVERY 2 MINUTES AS NEEDED Solomon Quinn MD        promethazine (PHENERGAN) 25 mg in 0.9% sodium chloride 50 mL IVPB  25 mg IntraVENous Q6H PRN Solomon Quinn MD   25 mg at 01/28/18 0236    sodium chloride (NS) flush 5-10 mL  5-10 mL IntraVENous PRN Magdalena Sanders DO           Chart and notes reviewed. Data reviewed. I have evaluated and examined the patient. IMPRESSION:   · Pt is POD 4 from diverting loop descending colon colostomy.        PLAN:/DISCUSION:   · CLD  · Stoma care        Eddie Aguilar MD

## 2018-01-29 NOTE — PROGRESS NOTES
Problem: Dysphagia (Adult)  Goal: *Acute Goals and Plan of Care (Insert Text)  Dysphagia Present:   No    Recommendations:  Diet: clears when cleared by surgery, advance to regular when pt more awake/ alert  Meds: as tolerated  Basic Aspiration Precautions     Patient will:  1. Participate in training and education related to continued aspiration risk, diet recs and compensatory strategies (goal met). Outcome: Resolved/Met Date Met: 01/29/18  Speech LAnguage Pathology bedside swallow evaluation/ treatment   AND DISCHARGE    Patient: Erin Pham (35 y.o. male)  Date: 1/29/2018  Primary Diagnosis: SBO (small bowel obstruction)  Abdominal pain  Bladder cancer (HCC)  Metastatic disease (HCC)  Intractable abdominal pain  DX  Procedure(s) (LRB):  LOOP COLOSTOMY FROM DESCENDING COLON (N/A) 4 Days Post-Op   Precautions:   Aspiration    ASSESSMENT :  EVAL/ TX:  Clinical beside swallow eval completed per MD orders. Pt A&Ox 2. Functional communication. OM examination revealed oral motor structures functional for mastication and deglutition. Presented with thin liquid and solid trials. Exhibited slow but functional bolus cohesion, manipulation and A-P transit. Further exhibited mildly delayed swallow timing/reflex initially, timely as session wore on and appropriate hyolaryngeal excursion. Pt able to manipulate and clear with 0 clinical s/s aspiration and/or oropharyngeal dysphagia. Pt safe for clear liquid diet when cleared by surgery, advance to regular/ thin when pt more awake and alert. 0 formal ST needs for dysphagia indicated at this time. SLP educated pt on role of speech therapist in current setting with re: speech/swallow; verbalized comprehension. SLP available for re-evaluation if indicated by MD. Results d/w RN, Shelbie Rodas. Thank you for this referral.   Jose M Balderas MS, CCC/SLP     PLAN :  Recommendations and Planned Interventions:  No formal ST needs ID'd for dysphagia. Eval only.    Discharge Recommendations: None     SUBJECTIVE:   Patient stated Skip.     OBJECTIVE:     Past Medical History:   Diagnosis Date    Acute sinusitis, unspecified     Alcohol abuse, unspecified     Bladder cancer ([facility] [address] 2013    chemo & radiation    Cough     Depressive disorder     Hematuria     HLD (hyperlipidemia)     Impotence of organic origin     Insomnia, unspecified     Other abnormal glucose     Other seborrheic keratosis     Pain in joint, shoulder region      Past Surgical History:   Procedure Laterality Date    COLONOSCOPY N/A 1/8/2018    [address]ON[address] with [address]impaction [address] [facility] Tre[facility]izo [facility] [address] N/A 01/25/2018    Dr. Selma Lawson  12/9/2014    Dr. Madalyn Macario HX [address] [place] [address]  01/04/2017    Tore (R) arm tendon, had surgery at [address]  01/04/13    [place] [address], TURBT - low-grade stage TA, Dr Arslan Watts  05/27/14    [facility], Cysto-TURP, Transurethral resection/multiple bladder biopsies - Papillary Urothelial Hyperplasia, Dr Kalina Delgadillo  1/29/15    Dr. Cain Simms - clot evacuation/fulguration     Prior Level of Function/Home Situation: as per H&P  Home Situation  Home Environment: Private residence  One/Two Story Residence: One story  Living Alone: Yes  Support Systems: Home care staff  Patient Expects to be Discharged to[de-identified] Private residence  Current DME Used/Available at Home: None  Diet prior to admission: regular/ thin  Current Diet:  NPO    Cognitive and Communication Status:  Neurologic State: Drowsy  Orientation Level: Oriented to place, Oriented to person  Cognition: Follows commands  Perception: Appears intact  Perseveration: No perseveration noted  Safety/Judgement: Fall prevention  Oral Assessment:  Oral Assessment  Labial: Other (comment) (slow but functional)  Dentition: Natural;Poor  Oral Hygiene: fair  Lingual: Other (comment) (slow but functional)  Velum: No impairment  Mandible: No impairment  P.O. Trials:  Patient Position: HOB 60*  Vocal quality prior to P.O.: No impairment  Consistency Presented: Ice chips; Solid; Thin liquid  How Presented: SLP-fed/presented;Spoon;Straw     Bolus Acceptance: No impairment  Bolus Formation/Control: Impaired  Type of Impairment: Mastication  Propulsion: Delayed (# of seconds)  Oral Residue: None  Initiation of Swallow: Delayed (# of seconds) (initially, functional as session wore on)  Laryngeal Elevation: Functional  Aspiration Signs/Symptoms: None  Pharyngeal Phase Characteristics: Easily fatigued   Effective Modifications: None  Cues for Modifications: None       Oral Phase Severity: Mild (due to level lethargy)  Pharyngeal Phase Severity : No impairment    GCODE Swallowing:  Swallow Current Status CH= 0%   Swallow Goal Status CH= 0%   Swallow D/C Status CH= 0%    The severity rating is based on the following outcomes:  YESENIA Noms Swallow Level 7    Clinical Judgement      PAIN:  Start of Eval: 0  End of Eval: 0     After evaluation:   []            Patient left in no apparent distress sitting up in chair  [x]            Patient left in no apparent distress in bed  [x]            Call bell left within reach  [x]            Nursing notified  []            Family present  []            Caregiver present  []            Bed alarm activated      COMMUNICATION/EDUCATION:   [x]            Aspiration precautions; swallow safety; compensatory techniques. [x]            Patient/family have participated as able in goal setting and plan of care. []            Patient/family agree to work toward stated goals and plan of care. []            Patient understands intent and goals of therapy; neutral about participation. []            Patient unable to participate in goal setting/plan of care; educ ongoing with interdisciplinary staff  []         Posted safety precautions in patient's room.     Thank you for this referral.  Ansley Escobar MS, CCC/SLP  Eval: 15 minutes  Tx: 14 minutes

## 2018-01-29 NOTE — PROGRESS NOTES
Pt c/o pain in chest, stomach and arm rated 5 with facial grimace noted. Skin warm and dry. EKG obtained with NSR noted. Pain and anxiety medication administered. [facility] notified. Restraints removed due to no attempts to remove lines noted. Visitor and sitter at bedside. Primary nurse aware. 1600 Nitro given as ordered. Repeat EKG completed. NSR.

## 2018-01-29 NOTE — PROGRESS NOTES
conducted an initial consultation and Spiritual Assessment for Filippo Richmond, who is a 57 y.o.,male. Patients Primary Language is: [place]. According to the patients EMR Mormon Affiliation is: Keni. The reason the Patient came to the hospital is:   Patient Active Problem List    Diagnosis Date Noted    Bowel obstruction 01/24/2018    Abdominal pain 01/24/2018    Metastatic disease ([facility] [address] 01/24/2018    Intractable abdominal pain 01/24/2018    Large bowel obstruction 01/24/2018    Chemotherapy-induced neuropathy ([facility] [address] 01/24/2018    Alcohol abuse 01/24/2018    Symptomatic anemia 06/10/2016    Cigarette smoker 03/15/2016    Anemia due to blood loss 11/24/2014    Neuropathy 11/24/2014    Diabetes ([facility] [address] 11/24/2014    Urinary retention 07/02/2014    Bladder cancer ([facility] 08/07/2013    Neoplasm of bladder 01/04/2013    Gross hematuria 12/12/2012    Bladder mass 49/41/4431    Renal colic 52/28/7725    Nicotine dependence 03/27/2009        The  provided the following Interventions:  Initiated a relationship of care and support. Explored issues of karlos, belief, spirituality and Gnosticist/ritual needs while hospitalized. Listened empathically. Patient was asleep and he was not disturbed.  dialogued with patient's brother-in-law about patient's journey. Provided information about Spiritual Care Services. Offered prayer and assurance of continued prayers on patient's behalf. The following outcomes where achieved:  Patient shared limited information about both their medical narrative and spiritual journey/beliefs.  confirmed Patient's Mormon Affiliation: patient is a believer in God but not connected to a [employer]. Patient processed feeling about current hospitalization. Patient expressed gratitude for 's visit.     Assessment:  Patient does not have any Gnosticist/cultural needs that will affect patients preferences in health care.  There are no spiritual or Latter-day issues which require intervention at this time. Plan:  Chaplains will continue to follow and will provide pastoral care on an as needed/requested basis.  recommends bedside caregivers page  on duty if patient shows signs of acute spiritual or emotional distress.       Wally Ray, [address] [place] [address]  Spiritual Care  [phone]

## 2018-01-29 NOTE — PROGRESS NOTES
Intern Progress Note  [facility]       Patient: Estefany Hill MRN: [id number]  CSN: [id number]    YOB: 1955  Age: 57 y.o. Sex: male    DOA: 1/24/2018 LOS:  LOS: 5 days                    Subjective:     Acute events: Pt resting in bed. Very lethargic. Pain well controlled. No complaints. Still restrained. Ostomy bag intact, no apparent leakage. Dark brown stool present in bag. No N/V. Review of Systems   Respiratory: Negative for shortness of breath. Cardiovascular: Negative for chest pain and palpitations. Gastrointestinal: Negative for abdominal pain, nausea and vomiting. Genitourinary: Negative for dysuria. Neurological: Negative for dizziness and headaches. Objective:      Patient Vitals for the past 24 hrs:   Temp Pulse Resp BP SpO2   01/29/18 0444 97.6 °F (36.4 °C) 63 17 157/81 94 %   01/28/18 2359 97.8 °F (36.6 °C) 65 18 161/82 96 %   01/28/18 1940 97.8 °F (36.6 °C) 78 20 167/83 96 %   01/28/18 1635 97.7 °F (36.5 °C) 66 20 (!) 166/96 99 %   01/28/18 1117 97.7 °F (36.5 °C) 71 18 167/76 97 %   01/28/18 0725 97.3 °F (36.3 °C) 71 18 138/75 99 %         Intake/Output Summary (Last 24 hours) at 01/29/18 0640  Last data filed at 01/29/18 0446   Gross per 24 hour   Intake              100 ml   Output             3400 ml   Net            -3300 ml       Physical Exam   Constitutional: He appears lethargic. He appears cachectic. He is cooperative. He is restrained. HENT:   Head: Normocephalic and atraumatic. Eyes: EOM are normal. Pupils are equal, round, and reactive to light. Neck: Normal range of motion. Cardiovascular: Normal rate, regular rhythm, normal heart sounds and intact distal pulses. Pulmonary/Chest: Effort normal and breath sounds normal.   Abdominal: Soft. Bowel sounds are normal. He exhibits no distension.    Incisional tenderness, ostomy site CDI, not presently leaking, dark brown stool in bag   Musculoskeletal: He exhibits no edema or tenderness. Neurological: He appears lethargic. Skin: Skin is warm and dry. Psychiatric: He has a normal mood and affect. His behavior is normal.       Lab/Data Reviewed:  Recent Results (from the past 24 hour(s))   POTASSIUM    Collection Time: 01/28/18  1:08 PM   Result Value Ref Range    Potassium 3.1 (L) 3.5 - 5.5 mmol/L   MAGNESIUM    Collection Time: 01/28/18  1:08 PM   Result Value Ref Range    Magnesium 1.4 (L) 1.6 - 2.6 mg/dL   POTASSIUM    Collection Time: 01/28/18  6:00 PM   Result Value Ref Range    Potassium 3.0 (L) 3.5 - 5.5 mmol/L       Scheduled Medications Reviewed:  Current Facility-Administered Medications   Medication Dose Route Frequency    0.9% sodium chloride 1,000 mL with mvi, adult no. 4 with vit K 10 mL, thiamine 245 mg, folic acid 1 mg infusion   IntraVENous Q24H    dextrose 5% - 0.45% NaCl with KCl 20 mEq/L infusion  100 mL/hr IntraVENous CONTINUOUS    famotidine (PF) (PEPCID) injection 20 mg  20 mg IntraVENous DAILY    ketorolac (TORADOL) injection 15 mg  15 mg IntraVENous Q8H    enoxaparin (LOVENOX) injection 40 mg  40 mg SubCUTAneous Q24H    nicotine (NICODERM CQ) 14 mg/24 hr patch 1 Patch  1 Patch TransDERmal DAILY    sodium chloride (NS) flush 5-10 mL  5-10 mL IntraVENous Q8H         Imaging, microbiology, and EKG/Telemetry:      Assessment/Plan     Nuria Macario is a 58 y/o male with PMHx of bladder CA with metastases to the lungs and spine, bowel obstruction, and HLD who is now admitted with bowel obstruction, and for intractable pain, nausea and vomiting. He is post-op day 4 from loop colostomy.       S/P Loop Colostomy for Bowel Obstruction 2/2 metastasis vs post-radiatio changes vs stricture-  Patient has a h/o metatastic bladder cancer.  [employer] performed loop colostomy without problems on 1/25/18.  Pt producing liquid stool now, stool is leaking around colostomy site;   - General surgery following, POD #4 s/p loop colostomy  - Surgery to manage leaking colostomy site   - Continue D5 1/2 NS w/ 20 KCl @ 100 cc/hr, reassess daily  - Dilaudid 1mg IV q3h PRN  - IV phenergan 25 mg q6h PRN for nausea/vomiting  - Diet per surgery, originally NPO for post-op, now NPO for mental status. Plan to advance diet once pt's mental status improves  - Concern for superficial surgical site infection; pt received 3 days of Unasyn, reevaluate abx today       Acute Delirium/Alcohol Withdrawal CIWA scores 12 overnight  - CIWA Protocol discontinued due to oversedation   - Continue Ativan 1 mg q4hrs PRN   - Continue Haldol 2mg IM q4h PRN  - Continue restraints, reassess q12 hrs     - Speech therapy consulted- pt passed swallow study, ok for clears when approved by surgery      Malignant Bladder Cancer/Urinary Incontinence- Followed by Dr. Emeli Lynch (Urology), Dr. Saul Gupta (Heme-Onc), & Dr. Lynette Clarke (Radiation Onc) as outpatient. - Heme-Onc consulted   - Urology consulted   - Palliative Care consulted   - Hold home oxybutynin XL 10 mg daily while NPO  - Once medically stable, acute delirium improves, Pt to go home on hospice; see hospice note from 1/25        Hypokalemia  - K 3.6>2.8>3.1>3.3>3.4>3.0>3.1  - 40 mEq KCL IV over 4 hours ordered  - Monitor BMP, replete as necessary  - IVF as above      Hypernatremia  - Na 264>205>391>534  - IVF as above      Chemotherapy-induced Neuropathy  - Hold home gabapentin 600 mg TID while NPO      Chronic Constipation: Colonoscopy (1/08/18) showed congestion and scarring in the rectum and large load of fecal impaction noted proximal to area of stricture.   - s/p loop colostomy      Nicotine Dependence  - Patient has a 43 pack year history and currently smokes about 1 ppd  - Nicotine patch, 14 mg/24 hrs           Diet: NPO per surgery, will consider advancing to clear liquids today   DVT Prophylaxis: Lovenox  Code Status: DNR  Point of Contact: Luis Lockhart, [phone], Sister   Reed Baeza, [phone], Brother-in-Law      Disposition and anticipated LOS: >2 midnights    Vipin Rivera MD, PGY-1  [facility]  01/29/18 6:40 AM

## 2018-01-29 NOTE — PROGRESS NOTES
7708 Received report from night nurse. Pt resting with eyes closed at this time. 6600  Patient attempting to get out of bed. Sitting up and had pulled off colostomy bad. Pt anxious and restless. Ativan given and effective. 1900 Patient anxious and restless several time throughout the shift. Ativan given x2 and Haldol x1 and effective. Dilaudid given x2 this shift for ABD pain. Colostomy bag and wafer changed x2. Sitter at bedside at this time. Pt resting with eyes closed. No acute distress noted. Will monitor. 1930 Report given to night nurse. Pt resting with eyes closed. No acute distress noted.

## 2018-01-29 NOTE — PROGRESS NOTES
Speech Pathology Note      Swallow eval completed b/s with recs of initiation of clears when cleared by surgery with advancement to regular solids when pt more awake/ alert. Pt tolerated all trials this date with prolonged mastication (due to level lethargy), without overt s/sx aspiration. Please write orders when appropriate. Thank you.     Trina Mojica MS, CCC/SLP  Speech- Language Pathologist

## 2018-01-29 NOTE — PROGRESS NOTES
Problem: Falls - Risk of  Goal: *Absence of Falls  Document Katie Fall Risk and appropriate interventions in the flowsheet.    Outcome: Progressing Towards Goal  Fall Risk Interventions:  Mobility Interventions: Bed/chair exit alarm, PT Consult for mobility concerns, Strengthening exercises (ROM-active/passive)    Mentation Interventions: Bed/chair exit alarm, Adequate sleep, hydration, pain control, Door open when patient unattended, Reorient patient, More frequent rounding, Room close to nurse's station, Update white board    Medication Interventions: Bed/chair exit alarm, Patient to call before getting OOB    Elimination Interventions: Bed/chair exit alarm, Call light in reach, Toileting schedule/hourly rounds

## 2018-01-29 NOTE — PROGRESS NOTES
Problem: Mobility Impaired (Adult and Pediatric)  Goal: *Acute Goals and Plan of Care (Insert Text)  Physical Therapy Goals  Initiated 1/25/2018 and to be accomplished within 7 day(s)  1. Patient will move from supine to sit and sit to supine, scoot up and down and roll side to side in bed with supervision/set-up. 2.  Patient will transfer from bed to chair and chair to bed with supervision/set-up using the least restrictive device. 3.  Patient will perform sit to stand with supervision/set-up. 4.  Patient will ambulate with supervision/set-up for >50 feet with the least restrictive device. 5.  Patient will ascend/descend 2 stairs with 1 handrail(s) with supervision/set-up. physical Therapy TREATMENT    Patient: Kain Gardner (89 y.o. male)  Date: 1/29/2018  Diagnosis: SBO (small bowel obstruction)  Abdominal pain  Bladder cancer (HCC)  Metastatic disease (HCC)  Intractable abdominal pain  DX Bowel obstruction  Procedure(s) (LRB):  LOOP COLOSTOMY FROM DESCENDING COLON (N/A) 4 Days Post-Op  Precautions: Aspiration,Fall  Chart, physical therapy assessment, plan of care and goals were reviewed. ASSESSMENT:  Pt presents with cont'd confusion/impaired safety awareness with bilateral wrist restraints intact at onset and replaced at conclusion of session. Pt demo's significantly impaired tolerance to activity 2/2 abdominal pain,however, is able to tolerate slightly increased mobility training today. Pt is found to have leaky colostomy with some liquid stool on abdomen,leg, and back. Pt's gown/pads changed and skin cleaned with bath wipes during bed mobility portion. Pt is responsive throughout and follows verbal cues with accuracy,however, at times req's serious tone to acquire pt's attention.   Progression toward goals:  []      Improving appropriately and progressing toward goals  [x]      Improving slowly and progressing toward goals  [x]      Not making progress toward goals and plan of care will be adjusted     PLAN:  Patient continues to benefit from skilled intervention to address the above impairments. Continue treatment per established plan of care. Discharge Recommendations:  SNF  Further Equipment Recommendations for Discharge:  N/A     G-CODES:     Mobility  Current  CJ= 20-39%   Goal  CI= 1-19%. The severity rating is based on the Level of Assistance required for Functional Mobility and ADLs. SUBJECTIVE:   Patient stated Raguel Quarto.  Pt states repeatedly 2/2 pain and reported nausea with sitting trial.    OBJECTIVE DATA SUMMARY:   Critical Behavior:  Neurologic State: Drowsy  Orientation Level: Oriented to place, Oriented to person  Cognition: Follows commands  Safety/Judgement: Fall prevention  Functional Mobility Training:  Bed Mobility:  Rolling: Contact guard assistance  Supine to Sit: Contact guard assistance  Sit to Supine: Contact guard assistance  Scooting: Stand-by asssistance  Transfers:  Sit to Stand: Minimum assistance  Stand to Sit: Minimum assistance  Bed to Chair:  (NT 2/2 impaired safety)  Balance:  Sitting: Impaired; Without support  Sitting - Static: Good (unsupported)  Sitting - Dynamic: Fair (occasional)  Standing: Impaired; With support  Standing - Static: Poor  Standing - Dynamic : Poor  Ambulation/Gait Training:  Base of Support:  (unable to achieve 2/2 impaired safety/dec tolerance)  Pain:  Pt reports /10 pain or discomfort prior to treatment.    Pt reports /10 pain or discomfort post treatment. Pt is moaning throughout in pain and reports pain in the abdomen,however, when asked pt is unable to quantify. Activity Tolerance:   Fair-  Please refer to the flowsheet for vital signs taken during this treatment.   After treatment:   [] Patient left in no apparent distress sitting up in chair  [x] Patient left in no apparent distress in bed with bilateral soft wrist restraints intact  [x] Call bell left within reach  [] Nursing notified  [x] Caregiver present  [] Bed alarm activated      [place] PTA   Time Calculation: 23 mins

## 2018-01-29 NOTE — CDMP QUERY
Please clarify if this patient is being treated/managed for:    =>   severe protein calorie malnutrition  in setting of  nausea/ vomiting  with supplements ordered. =>Other Explanation of clinical findings  =>Unable to Determine (no explanation of clinical findings)    The medical record reflects the following:    Risk:   Nutrition Diagnosis: Unintentional weight loss related to inadequate energy intake and altered GI function as evidenced by patient unable to tolerate po with nausea/vomiting resulting in 24 lb, 15% weight loss x 4 months.        Clinical Indicators:   Patient meets criteria for Severe Protein Calorie Malnutrition as evidenced by:   ASPEN Malnutrition Criteria  Acute Illness, Chronic Illness, or Social/Environmental: Chronic illness  Energy Intake: <75% est energy req for greater than/equal to 1 month  Weight Loss: Greater than 10% x 6 mos  ASPEN Malnutrition Score - Chronic Illness: 7  Chronic Illness - Malnutrition Diagnosis: Severe malnutrition. Treatment:    Discussed nutrition status and TPN recommendation  no plan to start oral diet today.  Pt not medically appropriate for TPN per MD.   IV fluid: D5 1/2 NS at 100 mL/hr (120 gm dextrose, 408 kcal/day)  possible hospice     Thank you,    Jose Garcia RN   CCDS   x [phone]

## 2018-01-29 NOTE — PROGRESS NOTES
Hematology/Medical Oncology Progress Note      NAME: Fredrick Jorge   :  1955  MRM:  [id number]    Date/Time: 2018  8:25 AM         Subjective:     Mr Cristofer Tadeo is a 57 y.o. Man with a history of metastatic bladder cancer. He was admitted with abdominal pain and found to have  Colonic obstruction. He underwent a laparotomy and his ostomy output is now good. He remains confused, to a lesser degree today, likely related to alcohol withdrawal symptoms. There are no complaints of pain. Past Medical History reviewed and unchanged from Admission History and Physical    Review of Systems   Constitutional: Positive for chills and fatigue. Negative for activity change, appetite change, diaphoresis, fever and unexpected weight change. HENT: Negative for congestion, facial swelling, mouth sores, nosebleeds, postnasal drip, trouble swallowing and voice change. Eyes: Negative for photophobia, pain, discharge and itching. Respiratory: Negative for apnea, cough, choking, chest tightness, wheezing and stridor. Cardiovascular: Negative for chest pain, palpitations and leg swelling. Gastrointestinal: Negative for abdominal pain, blood in stool, constipation, diarrhea, nausea and rectal pain. Genitourinary: Negative for difficulty urinating, dysuria, flank pain, hematuria and urgency. Musculoskeletal: Negative for arthralgias, back pain, gait problem, joint swelling, neck pain and neck stiffness. Skin: Negative for color change, pallor, rash and wound. Neurological: Negative for dizziness, facial asymmetry, speech difficulty, weakness, light-headedness and headaches. Hematological: Negative for adenopathy. Does not bruise/bleed easily. Psychiatric/Behavioral: Negative for agitation, confusion, hallucinations and sleep disturbance. Objective:       Vitals:      Last 24hrs VS reviewed since prior progress note.  Most recent are:    Visit Vitals    /81 (BP 1 Location: Right arm, BP Patient Position: At rest)    Pulse 62    Temp 97.5 °F (36.4 °C)    Resp 16    Ht 5' 9\" (1.753 m)    Wt 66.7 kg (147 lb 1.6 oz)    SpO2 97%    BMI 21.72 kg/m2     SpO2 Readings from Last 6 Encounters:   01/29/18 97%   01/08/18 97%   01/23/17 100%   06/11/16 99%   03/03/16 99%   10/01/15 100%    O2 Flow Rate (L/min): 2 l/min       Intake/Output Summary (Last 24 hours) at 01/29/18 0825  Last data filed at 01/29/18 0700   Gross per 24 hour   Intake           1692.5 ml   Output             3400 ml   Net          -1707.5 ml          Exam:      Physical Exam   Nursing note and vitals reviewed. Constitutional: He is oriented to person, place, and time. He appears well-developed and well-nourished. No distress. HENT:   Head: Normocephalic and atraumatic. Mouth/Throat: No oropharyngeal exudate. Eyes: Conjunctivae and EOM are normal. Pupils are equal, round, and reactive to light. Right eye exhibits no discharge. Left eye exhibits no discharge. No scleral icterus. Neck: Normal range of motion. Neck supple. No tracheal deviation present. No thyromegaly present. Cardiovascular: Normal rate and regular rhythm. Exam reveals no gallop and no friction rub. No murmur heard. Pulmonary/Chest: Effort normal and breath sounds normal. No apnea. No respiratory distress. He has no wheezes. He has no rales. Chest wall is not dull to percussion. He exhibits no mass, no tenderness, no bony tenderness, no laceration, no crepitus, no edema, no deformity, no swelling and no retraction. Right breast exhibits no inverted nipple, no mass, no nipple discharge, no skin change and no tenderness. Left breast exhibits no inverted nipple, no mass, no nipple discharge, no skin change and no tenderness. Breasts are symmetrical.   Abdominal: Soft. Bowel sounds are normal. He exhibits no distension. There is no tenderness. There is no rebound and no guarding. Musculoskeletal: Normal range of motion.  He exhibits no edema or tenderness. Lymphadenopathy:     He has no cervical adenopathy. Neurological: He is alert and oriented to person, place, and time. Coordination normal.   Skin: Skin is warm and dry. No rash noted. He is not diaphoretic. No erythema. No pallor. Psychiatric:   Confused       Telemetry reviewed:   normal sinus rhythm      Lab Data Reviewed: (see below)      Medications:  Current Facility-Administered Medications   Medication Dose Route Frequency    HYDROmorphone (PF) (DILAUDID) injection 1 mg  1 mg IntraVENous Q3H PRN    LORazepam (ATIVAN) injection 1 mg  1 mg IntraVENous Q4H PRN    haloperidol lactate (HALDOL) injection 2 mg  2 mg IntraMUSCular Q4H PRN    0.9% sodium chloride 1,000 mL with mvi, adult no. 4 with vit K 10 mL, thiamine 261 mg, folic acid 1 mg infusion   IntraVENous Q24H    dextrose 5% - 0.45% NaCl with KCl 20 mEq/L infusion  100 mL/hr IntraVENous CONTINUOUS    famotidine (PF) (PEPCID) injection 20 mg  20 mg IntraVENous DAILY    sodium chloride (NS) flush 5-10 mL  5-10 mL IntraVENous PRN    ketorolac (TORADOL) injection 15 mg  15 mg IntraVENous Q8H    enoxaparin (LOVENOX) injection 40 mg  40 mg SubCUTAneous Q24H    nicotine (NICODERM CQ) 14 mg/24 hr patch 1 Patch  1 Patch TransDERmal DAILY    sodium chloride (NS) flush 5-10 mL  5-10 mL IntraVENous Q8H    sodium chloride (NS) flush 5-10 mL  5-10 mL IntraVENous PRN    naloxone (NARCAN) injection 0.4 mg  0.4 mg IntraVENous EVERY 2 MINUTES AS NEEDED    promethazine (PHENERGAN) 25 mg in 0.9% sodium chloride 50 mL IVPB  25 mg IntraVENous Q6H PRN    sodium chloride (NS) flush 5-10 mL  5-10 mL IntraVENous PRN       ______________________________________________________________________      Lab Review:     Recent Labs      01/29/18   0529  01/28/18   0341  01/27/18   0307   WBC  5.7  6.5  6.9   HGB  13.7  12.4*  13.8   HCT  39.7  36.7  40.4   PLT  207  215  220     Recent Labs      01/28/18   1800  01/28/18   1308  01/28/18   0341  01/27/18 1915   01/27/18 0307 01/26/18   1540   NA   --    --   145   --    --   148*  148*   K  3.0*  3.1*  3.4*  4.1   < >  3.3*  3.3*   CL   --    --   113*   --    --   115*  112*   CO2   --    --   27   --    --   28  34*   GLU   --    --   83   --    --   97  107*   BUN   --    --   6*   --    --   15  20*   CREA   --    --   0.48*   --    --   0.59*  0.55*   CA   --    --   7.3*   --    --   8.0*  8.5   MG   --   1.4*   --   1.7   --   1.9   --    INR   --    --    --    --    --   1.2   --     < > = values in this interval not displayed. No components found for: GLPOC  No results for input(s): PH, PCO2, PO2, HCO3, FIO2 in the last 72 hours. Recent Labs      01/27/18 0307   INR  1.2       Other pertinent lab:          Assessment:     Principal Problem: Bowel obstruction (1/24/2018)    Active Problems:    Bladder cancer ([facility] [address] (8/7/2013)      Nicotine dependence (3/27/2009)      Metastatic disease ([address] (1/24/2018)      Intractable abdominal pain (1/24/2018)      Chemotherapy-induced neuropathy ([facility] [address] (1/24/2018)      Alcohol abuse (1/24/2018)           Plan:     Risk of deterioration: medium             1. Bowel obstruction: status post surgical intervention. Followed by surgery. 2. Metastatic bladder cancer: not a candidate for any additional systemic therapy. Hospice is recommended. DNR! Total time spent with patient:20 minutes                  Care Plan discussed with: Nursing Staff and Consultant/Specialist    Discussed:  Care Plan    Prophylaxis:  Lovenox    Disposition:   To be dedetmined           ___________________________________________________    Attending Physician: Josselin Bourgeois MD

## 2018-01-29 NOTE — PROGRESS NOTES
Intern Progress Note  [facility]       Patient: Tommy Balderrama MRN: [id number]  CSN: [id number]    YOB: 1955  Age: 57 y.o. Sex: male    DOA: 1/24/2018 LOS:  LOS: 4 days                    Subjective:     Patient was being cleaned up after liquid, dark green stool leaked from his colostomy and after having a liquid bowl movement from his rectum, per RN. Patient tolerated being cleaned and changed. Patient was noted to be a little restless and anxious after being cleaned. Patient was instructed to relax as patient was continuously yawning throughout my visit. Patient had received IV haldol 2 mg x1 @ 2000 and IV ativan 1 mg x1 @ 2100. Patient is pleasantly confused and not even oriented to himself but will respond to his name. Patient endorsed abdominal pain while nursing staff was cleaning him. ROS: unable to obtain due to mental status    Objective:      Patient Vitals for the past 24 hrs:   Temp Pulse Resp BP SpO2   01/28/18 1940 97.8 °F (36.6 °C) 78 20 167/83 96 %   01/28/18 1635 97.7 °F (36.5 °C) 66 20 (!) 166/96 99 %   01/28/18 1117 97.7 °F (36.5 °C) 71 18 167/76 97 %   01/28/18 0725 97.3 °F (36.3 °C) 71 18 138/75 99 %   01/28/18 0236 98 °F (36.7 °C) 84 18 155/78 96 %   01/28/18 0041 98.2 °F (36.8 °C) 84 20 157/80 96 %       Intake/Output Summary (Last 24 hours) at 01/28/18 2230  Last data filed at 01/28/18 1815   Gross per 24 hour   Intake           596.67 ml   Output             2800 ml   Net         -2203.33 ml       Physical Exam   Cardiovascular: Normal rate, regular rhythm and normal heart sounds. Exam reveals no gallop and no friction rub. No murmur heard. Pulmonary/Chest: Effort normal. No respiratory distress. He has no wheezes. He has no rales. Abdominal: Soft. Bowel sounds are normal. He exhibits no distension. There is tenderness. There is no rebound and no guarding.    Tenderness to palpation around colostomy insertion site with red, inflamed skin from constant leakage of bowel. Musculoskeletal: He exhibits no edema. Assessment/Plan     Cooper Jeff is a 58 y/o male with PMHx of bladder CA with metastases to the lungs and spine, bowel obstruction, and HLD who was admitted with intractable abdominal pain + nausea and vomiting 2/2 large bowel obstruction.  He is POD #2 from loop colostomy.    - Gentle redirection and reorientation  - Continue IV dilaudid 1 mg q3h prn for moderate-severe pain  - Continue IV ativan 1 mg q4h prn for agitation/restlessness  - Continue IV haldol 2 mg q4h prn for pyschosis  - Continue IV phenergan 25 mg q6h prn for nausea/vomiting  - Continue sitter when available  - Continue management per primary day team    Basilio Fleischer, MD, PGY-1  [facility]

## 2018-01-29 NOTE — ROUTINE PROCESS
Bedside and Verbal shift change report given to Theodora Fonseca RN   (oncoming nurse) by Aayush Melendez RN (offgoing nurse). Report included the following information SBAR, Kardex, Intake/Output, MAR, Recent Results and Cardiac Rhythm NSR.

## 2018-01-29 NOTE — CDMP QUERY
Please clarify if known  the cause  of the bowel obstruction  as mentioned   in      notes:       \"   Loop Colostomy for Bowel Obstruction 2/2 metastasis vs post-radiatio changes vs stricture\"     =>  =>Other Explanation of clinical findings    Thank you,   Mojgan Rodriguez RN   CCDS   x [phone]

## 2018-01-30 LAB
ANION GAP SERPL CALC-SCNC: 6 MMOL/L (ref 3–18)
ATRIAL RATE: 69 BPM
BASOPHILS # BLD: 0 K/UL (ref 0–0.1)
BASOPHILS NFR BLD: 0 % (ref 0–2)
BUN SERPL-MCNC: 5 MG/DL (ref 7–18)
BUN/CREAT SERPL: 10 (ref 12–20)
CALCIUM SERPL-MCNC: 7.3 MG/DL (ref 8.5–10.1)
CALCULATED P AXIS, ECG09: 55 DEGREES
CALCULATED R AXIS, ECG10: 63 DEGREES
CALCULATED T AXIS, ECG11: 68 DEGREES
CHLORIDE SERPL-SCNC: 113 MMOL/L (ref 100–108)
CK MB CFR SERPL CALC: 1.1 % (ref 0–4)
CK MB SERPL-MCNC: 3.1 NG/ML (ref 5–25)
CK SERPL-CCNC: 285 U/L (ref 39–308)
CO2 SERPL-SCNC: 24 MMOL/L (ref 21–32)
CREAT SERPL-MCNC: 0.48 MG/DL (ref 0.6–1.3)
DIAGNOSIS, 93000: NORMAL
DIFFERENTIAL METHOD BLD: ABNORMAL
EOSINOPHIL # BLD: 0.7 K/UL (ref 0–0.4)
EOSINOPHIL NFR BLD: 12 % (ref 0–5)
ERYTHROCYTE [DISTWIDTH] IN BLOOD BY AUTOMATED COUNT: 12.6 % (ref 11.6–14.5)
GLUCOSE SERPL-MCNC: 95 MG/DL (ref 74–99)
HCT VFR BLD AUTO: 38.6 % (ref 36–48)
HGB BLD-MCNC: 13.9 G/DL (ref 13–16)
LYMPHOCYTES # BLD: 1 K/UL (ref 0.9–3.6)
LYMPHOCYTES NFR BLD: 16 % (ref 21–52)
MAGNESIUM SERPL-MCNC: 1.8 MG/DL (ref 1.6–2.6)
MCH RBC QN AUTO: 33.3 PG (ref 24–34)
MCHC RBC AUTO-ENTMCNC: 36 G/DL (ref 31–37)
MCV RBC AUTO: 92.6 FL (ref 74–97)
MONOCYTES # BLD: 0.6 K/UL (ref 0.05–1.2)
MONOCYTES NFR BLD: 9 % (ref 3–10)
NEUTS SEG # BLD: 3.9 K/UL (ref 1.8–8)
NEUTS SEG NFR BLD: 63 % (ref 40–73)
P-R INTERVAL, ECG05: 128 MS
PLATELET # BLD AUTO: 220 K/UL (ref 135–420)
PMV BLD AUTO: 9.9 FL (ref 9.2–11.8)
POTASSIUM SERPL-SCNC: 3.2 MMOL/L (ref 3.5–5.5)
POTASSIUM UR-SCNC: 10 MMOL/L (ref 12–62)
Q-T INTERVAL, ECG07: 416 MS
QRS DURATION, ECG06: 80 MS
QTC CALCULATION (BEZET), ECG08: 445 MS
RBC # BLD AUTO: 4.17 M/UL (ref 4.7–5.5)
SODIUM SERPL-SCNC: 143 MMOL/L (ref 136–145)
TROPONIN I SERPL-MCNC: 0.02 NG/ML (ref 0–0.04)
VENTRICULAR RATE, ECG03: 69 BPM
WBC # BLD AUTO: 6.1 K/UL (ref 4.6–13.2)

## 2018-01-30 PROCEDURE — 84133 ASSAY OF URINE POTASSIUM: CPT | Performed by: FAMILY MEDICINE

## 2018-01-30 PROCEDURE — 74011000250 HC RX REV CODE- 250: Performed by: FAMILY MEDICINE

## 2018-01-30 PROCEDURE — 74011000250 HC RX REV CODE- 250: Performed by: STUDENT IN AN ORGANIZED HEALTH CARE EDUCATION/TRAINING PROGRAM

## 2018-01-30 PROCEDURE — 65660000004 HC RM CVT STEPDOWN

## 2018-01-30 PROCEDURE — 97535 SELF CARE MNGMENT TRAINING: CPT

## 2018-01-30 PROCEDURE — 36415 COLL VENOUS BLD VENIPUNCTURE: CPT

## 2018-01-30 PROCEDURE — 74011250636 HC RX REV CODE- 250/636: Performed by: STUDENT IN AN ORGANIZED HEALTH CARE EDUCATION/TRAINING PROGRAM

## 2018-01-30 PROCEDURE — 74011250636 HC RX REV CODE- 250/636: Performed by: FAMILY MEDICINE

## 2018-01-30 PROCEDURE — 82553 CREATINE MB FRACTION: CPT

## 2018-01-30 PROCEDURE — 74011000250 HC RX REV CODE- 250: Performed by: EMERGENCY MEDICINE

## 2018-01-30 PROCEDURE — 97530 THERAPEUTIC ACTIVITIES: CPT

## 2018-01-30 PROCEDURE — 74011000258 HC RX REV CODE- 258: Performed by: FAMILY MEDICINE

## 2018-01-30 PROCEDURE — 74011000258 HC RX REV CODE- 258: Performed by: STUDENT IN AN ORGANIZED HEALTH CARE EDUCATION/TRAINING PROGRAM

## 2018-01-30 PROCEDURE — 83735 ASSAY OF MAGNESIUM: CPT

## 2018-01-30 PROCEDURE — 74011250637 HC RX REV CODE- 250/637: Performed by: STUDENT IN AN ORGANIZED HEALTH CARE EDUCATION/TRAINING PROGRAM

## 2018-01-30 PROCEDURE — 74011250636 HC RX REV CODE- 250/636: Performed by: SURGERY

## 2018-01-30 PROCEDURE — 80048 BASIC METABOLIC PNL TOTAL CA: CPT

## 2018-01-30 PROCEDURE — 85025 COMPLETE CBC W/AUTO DIFF WBC: CPT

## 2018-01-30 RX ADMIN — LIDOCAINE HYDROCHLORIDE: 10 INJECTION, SOLUTION EPIDURAL; INFILTRATION; INTRACAUDAL; PERINEURAL at 00:52

## 2018-01-30 RX ADMIN — LIDOCAINE HYDROCHLORIDE: 10 INJECTION, SOLUTION EPIDURAL; INFILTRATION; INTRACAUDAL; PERINEURAL at 09:00

## 2018-01-30 RX ADMIN — FAMOTIDINE 20 MG: 10 INJECTION INTRAVENOUS at 08:45

## 2018-01-30 RX ADMIN — HALOPERIDOL LACTATE 2 MG: 5 INJECTION, SOLUTION INTRAMUSCULAR at 11:30

## 2018-01-30 RX ADMIN — ENOXAPARIN SODIUM 40 MG: 40 INJECTION SUBCUTANEOUS at 21:06

## 2018-01-30 RX ADMIN — KETOROLAC TROMETHAMINE 15 MG: 30 INJECTION, SOLUTION INTRAMUSCULAR at 06:14

## 2018-01-30 RX ADMIN — LORAZEPAM 1 MG: 2 INJECTION INTRAMUSCULAR; INTRAVENOUS at 15:00

## 2018-01-30 RX ADMIN — HALOPERIDOL LACTATE 2 MG: 5 INJECTION, SOLUTION INTRAMUSCULAR at 21:06

## 2018-01-30 RX ADMIN — Medication 10 ML: at 14:00

## 2018-01-30 RX ADMIN — HYDROMORPHONE HYDROCHLORIDE 1 MG: 1 INJECTION, SOLUTION INTRAMUSCULAR; INTRAVENOUS; SUBCUTANEOUS at 17:13

## 2018-01-30 RX ADMIN — Medication 10 ML: at 21:07

## 2018-01-30 RX ADMIN — LIDOCAINE HYDROCHLORIDE: 10 INJECTION, SOLUTION EPIDURAL; INFILTRATION; INTRACAUDAL; PERINEURAL at 11:00

## 2018-01-30 RX ADMIN — HYDROMORPHONE HYDROCHLORIDE 1 MG: 1 INJECTION, SOLUTION INTRAMUSCULAR; INTRAVENOUS; SUBCUTANEOUS at 23:22

## 2018-01-30 RX ADMIN — HYDROMORPHONE HYDROCHLORIDE 1 MG: 1 INJECTION, SOLUTION INTRAMUSCULAR; INTRAVENOUS; SUBCUTANEOUS at 00:57

## 2018-01-30 RX ADMIN — LIDOCAINE HYDROCHLORIDE: 10 INJECTION, SOLUTION EPIDURAL; INFILTRATION; INTRACAUDAL; PERINEURAL at 02:54

## 2018-01-30 RX ADMIN — Medication 5 ML: at 06:00

## 2018-01-30 RX ADMIN — FOLIC ACID: 5 INJECTION, SOLUTION INTRAMUSCULAR; INTRAVENOUS; SUBCUTANEOUS at 03:00

## 2018-01-30 RX ADMIN — LIDOCAINE HYDROCHLORIDE: 10 INJECTION, SOLUTION EPIDURAL; INFILTRATION; INTRACAUDAL; PERINEURAL at 12:00

## 2018-01-30 RX ADMIN — HALOPERIDOL LACTATE 2 MG: 5 INJECTION, SOLUTION INTRAMUSCULAR at 03:00

## 2018-01-30 RX ADMIN — LIDOCAINE HYDROCHLORIDE: 10 INJECTION, SOLUTION EPIDURAL; INFILTRATION; INTRACAUDAL; PERINEURAL at 01:47

## 2018-01-30 RX ADMIN — LIDOCAINE HYDROCHLORIDE: 10 INJECTION, SOLUTION EPIDURAL; INFILTRATION; INTRACAUDAL; PERINEURAL at 10:00

## 2018-01-30 NOTE — PROGRESS NOTES
1905  Bedside turnover given to me, pt is in bed with cardiac monitor on. He has a friend at bedside. He is calm. Pt's friend left, he is trying to get out of bed,  Screaming and trying to take restraints off.    2110 Gown changed colostomy bag changed it is leaking stool all over his bed and his side flank area is red and swollen. As I wipe the stool off of it he screams. 2304 new gown placed on patient and his colostomy area was cleaned again. Still red and agitated. 0110 pt gown changed again covered with stool. With the restraints on he was still able to get his hands in it and was trying to pull colostomy bag off. His restraints were adjusted. 0700 bedside turnover given to MARIE Echols (Travel RN )  Pt in bed slightly agitated yelling \"Oh golly\",. SBAR< MAR, ED summary and previous evenings events given with a chance to ask questions. Extra supplies left in the room to change colostomy. Informed her about his scheduled toradol and risk for CIWA though not on the protocol, he is a drinker. Also gave her [facility]'s phone number in case she needs to contact them.

## 2018-01-30 NOTE — PROGRESS NOTES
In response to CDMP query, pt evaluated for severe protein calorie malnutrition bu nutrition. Was unable to receive supplements d/t NPO status for problems with mentation. Appreciate nutrition recs now that diet has been advanced.     Leslee Verdin MD, PGY-1  [facility] Medicine  01/30/18 9:05 AM

## 2018-01-30 NOTE — PROGRESS NOTES
Dinesh Landaverde M.D. FACS  PROGRESS NOTE    Name: Usha Souza MRN: [id number]   : 1955 [facility]   Date: 2018 Admission Date: 2018  2:12 PM     Hospital Day: 7  5 Days Post-Op  Subjective:  Pt without acute overnight events. Objective:  Vitals:    18 2045 18 0000 18 0359 18 0413   BP: 149/76  112/75    Pulse: 68 65 72    Resp:     Temp: 98.4 °F (36.9 °C) 97.8 °F (36.6 °C) 98.6 °F (37 °C)    SpO2: 100% 100% 96%    Weight:    66.7 kg (147 lb 1.5 oz)   Height:           Date 18 0700 - 18 0659 18 0700 - 18 0659   Shift 8012-9354 0093-3785 24 Hour Total 0013-3055 6461-8153 24 Hour Total   I  N  T  A  K  E   P.O. 300  300         P. O. 300  300       I.V.  (mL/kg/hr) 2635.8  (3.3)  2635.8  (1.6)         Volume (0.9% sodium chloride 1,000 mL with mvi, adult no. 4 with vit K 10 mL, thiamine 402 mg, folic acid 1 mg infusion) 837.5  837.5         Volume (dextrose 5% - 0.45% NaCl with KCl 20 mEq/L infusion) 1798.3  1798.3       Shift Total  (mL/kg) 2935.8  (44)  2935.8  (44)      O  U  T  P  U  T   Urine  (mL/kg/hr) 1000  (1.2) 1400  (1.7) 2400  (1.5)         Urine Output (mL) (Urinary Catheter 18 Patel) 1000 1400 2400       Stool  225 225         Output (ml) (Colostomy 18 Left; Lower  Abdomen)  225 225       Shift Total  (mL/kg) 1000  (15) 1625  (24.4) 2625  (39.3)      NET 1935.8 -1625 310.8      Weight (kg) 66.7 66.7 66.7 66.7 66.7 66.7         Physical Exam:    General: Awake and alert, oriented ×2 to year and self   Abdomen: abdomen is soft with incisional tenderness. Incision(s) are C/D/I. Stoma is pink and viable  functioning well.   No masses, organomegaly or guarding    Labs:  Recent Results (from the past 24 hour(s))   GLUCOSE, POC    Collection Time: 18  7:44 AM   Result Value Ref Range    Glucose (POC) 95 70 - 110 mg/dL   EKG, 12 LEAD, INITIAL    Collection Time: 18  1:49 PM Result Value Ref Range    Ventricular Rate 58 BPM    Atrial Rate 58 BPM    P-R Interval 126 ms    QRS Duration 80 ms    Q-T Interval 450 ms    QTC Calculation (Bezet) 441 ms    Calculated P Axis 44 degrees    Calculated R Axis 71 degrees    Calculated T Axis 73 degrees    Diagnosis       Sinus bradycardia  Nonspecific T wave abnormality  When compared with ECG of 25-FEB-2016 09:29,  Nonspecific T wave abnormality now evident in Anterior leads  Confirmed by Elsie Rick MD, Pacheco Johnston ([id number]) on 1/29/2018 5:47:28 PM     CARDIAC PANEL,(CK, CKMB & TROPONIN)    Collection Time: 01/29/18  3:28 PM   Result Value Ref Range     39 - 308 U/L    CK - MB 2.0 <3.6 ng/ml    CK-MB Index 1.0 0.0 - 4.0 %    Troponin-I, Qt. 0.02 0.0 - 0.045 NG/ML   EKG, 12 LEAD, INITIAL    Collection Time: 01/29/18  3:55 PM   Result Value Ref Range    Ventricular Rate 69 BPM    Atrial Rate 69 BPM    P-R Interval 128 ms    QRS Duration 80 ms    Q-T Interval 416 ms    QTC Calculation (Bezet) 445 ms    Calculated P Axis 55 degrees    Calculated R Axis 63 degrees    Calculated T Axis 68 degrees    Diagnosis       Normal sinus rhythm  Nonspecific T wave abnormality  Abnormal ECG  When compared with ECG of 29-JAN-2018 13:49,  No significant change was found     POTASSIUM    Collection Time: 01/29/18  6:38 PM   Result Value Ref Range    Potassium 3.2 (L) 3.5 - 5.5 mmol/L   CARDIAC PANEL,(CK, CKMB & TROPONIN)    Collection Time: 01/29/18  9:11 PM   Result Value Ref Range     39 - 308 U/L    CK - MB 2.6 <3.6 ng/ml    CK-MB Index 1.0 0.0 - 4.0 %    Troponin-I, Qt. 0.02 0.0 - 0.045 NG/ML   GLUCOSE, POC    Collection Time: 01/29/18  9:38 PM   Result Value Ref Range    Glucose (POC) 128 (H) 70 - 110 mg/dL   MAGNESIUM    Collection Time: 01/30/18  5:04 AM   Result Value Ref Range    Magnesium 1.8 1.6 - 2.6 mg/dL   CBC WITH AUTOMATED DIFF    Collection Time: 01/30/18  5:04 AM   Result Value Ref Range    WBC 6.1 4.6 - 13.2 K/uL    RBC 4.17 (L) 4.70 - 5.50 M/uL HGB 13.9 13.0 - 16.0 g/dL    HCT 38.6 36.0 - 48.0 %    MCV 92.6 74.0 - 97.0 FL    MCH 33.3 24.0 - 34.0 PG    MCHC 36.0 31.0 - 37.0 g/dL    RDW 12.6 11.6 - 14.5 %    PLATELET 840 994 - 969 K/uL    MPV 9.9 9.2 - 11.8 FL    NEUTROPHILS 63 40 - 73 %    LYMPHOCYTES 16 (L) 21 - 52 %    MONOCYTES 9 3 - 10 %    EOSINOPHILS 12 (H) 0 - 5 %    BASOPHILS 0 0 - 2 %    ABS. NEUTROPHILS 3.9 1.8 - 8.0 K/UL    ABS. LYMPHOCYTES 1.0 0.9 - 3.6 K/UL    ABS. MONOCYTES 0.6 0.05 - 1.2 K/UL    ABS. EOSINOPHILS 0.7 (H) 0.0 - 0.4 K/UL    ABS.  BASOPHILS 0.0 0.0 - 0.1 K/UL    DF AUTOMATED     METABOLIC PANEL, BASIC    Collection Time: 01/30/18  5:04 AM   Result Value Ref Range    Sodium 143 136 - 145 mmol/L    Potassium 3.2 (L) 3.5 - 5.5 mmol/L    Chloride 113 (H) 100 - 108 mmol/L    CO2 24 21 - 32 mmol/L    Anion gap 6 3.0 - 18 mmol/L    Glucose 95 74 - 99 mg/dL    BUN 5 (L) 7.0 - 18 MG/DL    Creatinine 0.48 (L) 0.6 - 1.3 MG/DL    BUN/Creatinine ratio 10 (L) 12 - 20      GFR est AA >60 >60 ml/min/1.73m2    GFR est non-AA >60 >60 ml/min/1.73m2    Calcium 7.3 (L) 8.5 - 10.1 MG/DL   CARDIAC PANEL,(CK, CKMB & TROPONIN)    Collection Time: 01/30/18  5:04 AM   Result Value Ref Range     39 - 308 U/L    CK - MB 3.1 <3.6 ng/ml    CK-MB Index 1.1 0.0 - 4.0 %    Troponin-I, Qt. 0.02 0.0 - 0.045 NG/ML     All Micro Results     None          Current Medications:  Current Facility-Administered Medications   Medication Dose Route Frequency Provider Last Rate Last Dose    potassium chloride 10 mEq, lidocaine (PF) (XYLOCAINE) 10 mg/mL (1 %) 1 mL in 0.9% sodium chloride 100 mL IVPB   IntraVENous Q1H Collin Chávez MD        haloperidol lactate (HALDOL) injection 2 mg  2 mg IntraMUSCular Q8H Collin Chávez MD   2 mg at 01/30/18 0300    HYDROmorphone (PF) (DILAUDID) injection 1 mg  1 mg IntraVENous Q3H PRN DIAEN Way   1 mg at 01/30/18 0057    LORazepam (ATIVAN) injection 1 mg  1 mg IntraVENous Q4H PRN Pierre Ramirez MD Jayde   1 mg at 01/29/18 2206    0.9% sodium chloride 1,000 mL with mvi, adult no. 4 with vit K 10 mL, thiamine 249 mg, folic acid 1 mg infusion   IntraVENous Q24H Gage Kern  mL/hr at 01/30/18 0300      dextrose 5% - 0.45% NaCl with KCl 20 mEq/L infusion  100 mL/hr IntraVENous CONTINUOUS Jewel Soto  mL/hr at 01/29/18 0800 100 mL/hr at 01/29/18 0800    famotidine (PF) (PEPCID) injection 20 mg  20 mg IntraVENous DAILY Dawn Vásquez MD   20 mg at 01/29/18 0945    sodium chloride (NS) flush 5-10 mL  5-10 mL IntraVENous PRN Ant Wilmer, DO        enoxaparin (LOVENOX) injection 40 mg  40 mg SubCUTAneous Q24H [place] Wilmer, DO   40 mg at 01/29/18 2207    nicotine (NICODERM CQ) 14 mg/24 hr patch 1 Patch  1 Patch TransDERmal DAILY Negar Joseph MD   1 Patch at 01/29/18 0900    sodium chloride (NS) flush 5-10 mL  5-10 mL IntraVENous Q8H Negar Joseph MD   5 mL at 01/30/18 0600    sodium chloride (NS) flush 5-10 mL  5-10 mL IntraVENous PRN Negar Joseph MD   10 mL at 01/27/18 2041    naloxone (NARCAN) injection 0.4 mg  0.4 mg IntraVENous EVERY 2 MINUTES AS NEEDED Negar Joseph MD        promethazine (PHENERGAN) 25 mg in 0.9% sodium chloride 50 mL IVPB  25 mg IntraVENous Q6H PRN Negar Joseph MD   25 mg at 01/28/18 0236    sodium chloride (NS) flush 5-10 mL  5-10 mL IntraVENous PRN [place] Wilmer, DO           Chart and notes reviewed. Data reviewed. I have evaluated and examined the patient. IMPRESSION:   · Pt is POD 5 from diverting loop colostomy for rectosigmoid stenosis.        PLAN:/DISCUSION:   · CLD advance as tolerated  · Stoma care        Leslye Rinne, MD

## 2018-01-30 NOTE — PROGRESS NOTES
Urology Progress Note        Assessment/Plan:     Patient Active Problem List   Diagnosis Code    Gross hematuria R31.0    Bladder mass N32.89    Bladder cancer ([facility] [address] C67.9    Urinary retention R33.9    Anemia due to blood loss D50.0    Neuropathy G62.9    Diabetes (HCC) E11.9    Cigarette smoker G76.159    Renal colic H43    Nicotine dependence F17.200    Neoplasm of bladder D49.4    Symptomatic anemia D64.9    Bowel obstruction K56.609    Abdominal pain R10.9    Metastatic disease (HCC) C79.9    Intractable abdominal pain R10.9    Large bowel obstruction K56.609    Chemotherapy-induced neuropathy ([facility] [address] G62.0, T45.1X5A    Alcohol abuse F10.10         Plan:  Discharge home [place]  F/U  IN   4 WKS IN OUR OFFICE TO Rajan Wilkinson MD    [phone]      Subjective:     Daily Progress Note: 2018 8:38 AM    George Gee is doing satisfactory. He reports pain is moderately controlled. He has no new complaints. He is tolerating clear liquids and unable to get out of bed. Indwelling catheter is draining well. URINE BLOOD TINGED BUT WITHOUT CLOTS      Objective:     Visit Vitals    /75 (BP 1 Location: Right arm, BP Patient Position: At rest)    Pulse 71    Temp 98.4 °F (36.9 °C)    Resp 18    Ht 5' 9\" (1.753 m)    Wt 147 lb 1.5 oz (66.7 kg)    SpO2 94%    BMI 21.72 kg/m2        Temp (24hrs), Av °F (36.7 °C), Min:97.3 °F (36.3 °C), Max:98.6 °F (37 °C)      Intake and Output:   1901 -  0700  In: 3035.8 [P.O.:300; I.V.:2735.8]  Out: 3725 [Urine:3500]       PHYSICAL EXAMINATION:   Visit Vitals    /75 (BP 1 Location: Right arm, BP Patient Position: At rest)    Pulse 71    Temp 98.4 °F (36.9 °C)    Resp 18    Ht 5' 9\" (1.753 m)    Wt 147 lb 1.5 oz (66.7 kg)    SpO2 94%    BMI 21.72 kg/m2     Constitutional: Well developed, well nourished. No acute distress.     HEENT: Normocephalic, Atraumatic, EOM's intact   CV:  RRR  Respiratory: No respiratory distress or difficulties breathing   Abdomen:  Soft, non-tender, non-distended   Male:   CVA tenderness: NEG                   SCROTUM:  NL         PENIS: NL  Patel:  LITE PINK URIINE WITHOUT CLOSTS  Skin: No evidence of jaundice. Normal color  Neuro/Psych:  Alert and oriented. Affect appropriate. Incision: clean, dry, no drainage    Lab/Data Review: All lab results for the last 24 hours reviewed. Labs:     Labs: Results:   Chemistry    Recent Labs      01/30/18   0504  01/29/18   1838  01/29/18   0529   01/28/18   0341   GLU  95   --   85   --   83   NA  143   --   144   --   145   K  3.2*  3.2*  3.1*   < >  3.4*   CL  113*   --   113*   --   113*   CO2  24   --   24   --   27   BUN  5*   --   4*   --   6*   CREA  0.48*   --   0.42*   --   0.48*   CA  7.3*   --   7.3*   --   7.3*   AGAP  6   --   7   --   5   BUCR  10*   --   10*   --   13    < > = values in this interval not displayed. CBC w/Diff Recent Labs      01/30/18   0504  01/29/18   0529  01/28/18   0341   WBC  6.1  5.7  6.5   RBC  4.17*  4.26*  3.82*   HGB  13.9  13.7  12.4*   HCT  38.6  39.7  36.7   PLT  220  207  215   GRANS  63  65  62   LYMPH  16*  15*  18*   EOS  12*  11*  9*      Cultures No results for input(s): CULT in the last 72 hours.   All Micro Results     None            Urinalysis Color   Date Value Ref Range Status   11/24/2014 YELLOW   Final     Appearance   Date Value Ref Range Status   11/24/2014 BLOODY   Final     Specific gravity   Date Value Ref Range Status   11/24/2014 1.020 1.003 - 1.030   Final     pH (UA)   Date Value Ref Range Status   11/24/2014 7.0 5.0 - 8.0   Final     Protein   Date Value Ref Range Status   11/24/2014 TRACE (A) NEG mg/dL Final     Ketone   Date Value Ref Range Status   11/24/2014 NEGATIVE  NEG mg/dL Final     Bilirubin   Date Value Ref Range Status   11/24/2014 SMALL (A) NEG   Final     Comment:     (NOTE)  Positive, unable to confirm with Ictotest.     Blood   Date Value Ref Range Status   11/24/2014 LARGE (A) NEG   Final     Urobilinogen   Date Value Ref Range Status   11/24/2014 0.2 0.2 - 1.0 EU/dL Final     Nitrites   Date Value Ref Range Status   11/24/2014 NEGATIVE  NEG   Final     Leukocyte Esterase   Date Value Ref Range Status   11/24/2014 NEGATIVE  NEG   Final     Potassium   Date Value Ref Range Status   01/30/2018 3.2 (L) 3.5 - 5.5 mmol/L Final     Creatinine   Date Value Ref Range Status   01/30/2018 0.48 (L) 0.6 - 1.3 MG/DL Final     BUN   Date Value Ref Range Status   01/30/2018 5 (L) 7.0 - 18 MG/DL Final      PSA No results for input(s): PSA in the last 72 hours.    Coagulation Lab Results   Component Value Date/Time    Prothrombin time 14.5 01/27/2018 03:07 AM    Prothrombin time 12.6 02/25/2016 09:32 AM    INR 1.2 01/27/2018 03:07 AM    INR 0.9 02/25/2016 09:32 AM    aPTT 28.1 02/25/2016 09:32 AM    aPTT 31.8 09/24/2015 02:35 PM

## 2018-01-30 NOTE — PROGRESS NOTES
Problem: Mobility Impaired (Adult and Pediatric)  Goal: *Acute Goals and Plan of Care (Insert Text)  Physical Therapy Goals  Initiated 1/25/2018 and to be accomplished within 7 day(s)  1. Patient will move from supine to sit and sit to supine, scoot up and down and roll side to side in bed with supervision/set-up. 2.  Patient will transfer from bed to chair and chair to bed with supervision/set-up using the least restrictive device. 3.  Patient will perform sit to stand with supervision/set-up. 4.  Patient will ambulate with supervision/set-up for >50 feet with the least restrictive device. 5.  Patient will ascend/descend 2 stairs with 1 handrail(s) with supervision/set-up. Time: 1010     Pt currently unavailable 2/2 nurse in room performing ostomy care. Will follow up as day progresses.     Ines Rossi PTA  1/30/2018

## 2018-01-30 NOTE — PROGRESS NOTES
Received call from JESICA at [facility]. Patient was in restraints 1/29/18 and has to be without restraints for 72 hours. Plan for DC to [facility] 2/1/18.   Will also need updated PT/OT notes  CM will continue to follow with IDT to assist with DC needs identified

## 2018-01-30 NOTE — PROGRESS NOTES
Hematology/Medical Oncology Progress Note      NAME: Kristofer Faith   :  1955  MRM:  [id number]    Date/Time: 2018  8:53 AM         Subjective:     Mr Sarah Smith is a 57 y.o. Man with a history of metastatic bladder cancer. He was admitted with abdominal pain and found to have  Colonic obstruction. He underwent a laparotomy and his ostomy output is now good. The patient is fully alert at this time. There is minimal confusion. The restraints have been removed. There are no complaints of pain. Past Medical History reviewed and unchanged from Admission History and Physical    Review of Systems   Constitutional: Positive for chills and fatigue. Negative for activity change, appetite change, diaphoresis, fever and unexpected weight change. HENT: Negative for congestion, facial swelling, mouth sores, nosebleeds, postnasal drip, trouble swallowing and voice change. Eyes: Negative for photophobia, pain, discharge and itching. Respiratory: Negative for apnea, cough, choking, chest tightness, wheezing and stridor. Cardiovascular: Negative for chest pain, palpitations and leg swelling. Gastrointestinal: Negative for abdominal pain, blood in stool, constipation, diarrhea, nausea and rectal pain. Genitourinary: Negative for difficulty urinating, dysuria, flank pain, hematuria and urgency. Musculoskeletal: Negative for arthralgias, back pain, gait problem, joint swelling, neck pain and neck stiffness. Skin: Negative for color change, pallor, rash and wound. Neurological: Negative for dizziness, facial asymmetry, speech difficulty, weakness, light-headedness and headaches. Hematological: Negative for adenopathy. Does not bruise/bleed easily. Psychiatric/Behavioral: Negative for agitation, confusion, hallucinations and sleep disturbance. Objective:       Vitals:      Last 24hrs VS reviewed since prior progress note.  Most recent are:    Visit Vitals    /75 (BP 1 Location: Right arm, BP Patient Position: At rest)    Pulse 71    Temp 98.4 °F (36.9 °C)    Resp 18    Ht 5' 9\" (1.753 m)    Wt 66.7 kg (147 lb 1.5 oz)    SpO2 94%    BMI 21.72 kg/m2     SpO2 Readings from Last 6 Encounters:   01/30/18 94%   01/08/18 97%   01/23/17 100%   06/11/16 99%   03/03/16 99%   10/01/15 100%    O2 Flow Rate (L/min): 2 l/min       Intake/Output Summary (Last 24 hours) at 01/30/18 0854  Last data filed at 01/30/18 0558   Gross per 24 hour   Intake          1343.33 ml   Output             2625 ml   Net         -1281.67 ml          Exam:      Physical Exam   Nursing note and vitals reviewed. Constitutional: He is oriented to person, place, and time. He appears well-developed and well-nourished. No distress. HENT:   Head: Normocephalic and atraumatic. Mouth/Throat: No oropharyngeal exudate. Eyes: Conjunctivae and EOM are normal. Pupils are equal, round, and reactive to light. Right eye exhibits no discharge. Left eye exhibits no discharge. No scleral icterus. Neck: Normal range of motion. Neck supple. No tracheal deviation present. No thyromegaly present. Cardiovascular: Normal rate and regular rhythm. Exam reveals no gallop and no friction rub. No murmur heard. Pulmonary/Chest: Effort normal and breath sounds normal. No apnea. No respiratory distress. He has no wheezes. He has no rales. Chest wall is not dull to percussion. He exhibits no mass, no tenderness, no bony tenderness, no laceration, no crepitus, no edema, no deformity, no swelling and no retraction. Right breast exhibits no inverted nipple, no mass, no nipple discharge, no skin change and no tenderness. Left breast exhibits no inverted nipple, no mass, no nipple discharge, no skin change and no tenderness. Breasts are symmetrical.   Abdominal: Soft. Bowel sounds are normal. He exhibits no distension. There is no tenderness. There is no rebound and no guarding. Musculoskeletal: Normal range of motion.  He exhibits no edema or tenderness. Lymphadenopathy:     He has no cervical adenopathy. Neurological: He is alert and oriented to person, place, and time. Coordination normal.   Skin: Skin is warm and dry. No rash noted. He is not diaphoretic. No erythema. No pallor. Psychiatric:   Confused       Telemetry reviewed:   normal sinus rhythm      Lab Data Reviewed: (see below)      Medications:  Current Facility-Administered Medications   Medication Dose Route Frequency    potassium chloride 10 mEq, lidocaine (PF) (XYLOCAINE) 10 mg/mL (1 %) 1 mL in 0.9% sodium chloride 100 mL IVPB   IntraVENous Q1H    haloperidol lactate (HALDOL) injection 2 mg  2 mg IntraMUSCular Q8H    HYDROmorphone (PF) (DILAUDID) injection 1 mg  1 mg IntraVENous Q3H PRN    LORazepam (ATIVAN) injection 1 mg  1 mg IntraVENous Q4H PRN    0.9% sodium chloride 1,000 mL with mvi, adult no. 4 with vit K 10 mL, thiamine 744 mg, folic acid 1 mg infusion   IntraVENous Q24H    dextrose 5% - 0.45% NaCl with KCl 20 mEq/L infusion  100 mL/hr IntraVENous CONTINUOUS    famotidine (PF) (PEPCID) injection 20 mg  20 mg IntraVENous DAILY    sodium chloride (NS) flush 5-10 mL  5-10 mL IntraVENous PRN    enoxaparin (LOVENOX) injection 40 mg  40 mg SubCUTAneous Q24H    nicotine (NICODERM CQ) 14 mg/24 hr patch 1 Patch  1 Patch TransDERmal DAILY    sodium chloride (NS) flush 5-10 mL  5-10 mL IntraVENous Q8H    sodium chloride (NS) flush 5-10 mL  5-10 mL IntraVENous PRN    naloxone (NARCAN) injection 0.4 mg  0.4 mg IntraVENous EVERY 2 MINUTES AS NEEDED    promethazine (PHENERGAN) 25 mg in 0.9% sodium chloride 50 mL IVPB  25 mg IntraVENous Q6H PRN    sodium chloride (NS) flush 5-10 mL  5-10 mL IntraVENous PRN       ______________________________________________________________________      Lab Review:     Recent Labs      01/30/18   0504  01/29/18   0529  01/28/18   0341   WBC  6.1  5.7  6.5   HGB  13.9  13.7  12.4*   HCT  38.6  39.7  36.7   PLT  220  207  215     Recent Labs 01/30/18   0504  01/29/18   1838  01/29/18   0529   01/28/18   1308  01/28/18   0341   NA  143   --   144   --    --   145   K  3.2*  3.2*  3.1*   < >  3.1*  3.4*   CL  113*   --   113*   --    --   113*   CO2  24   --   24   --    --   27   GLU  95   --   85   --    --   83   BUN  5*   --   4*   --    --   6*   CREA  0.48*   --   0.42*   --    --   0.48*   CA  7.3*   --   7.3*   --    --   7.3*   MG  1.8   --   2.2   --   1.4*   --     < > = values in this interval not displayed. No components found for: GLPOC  No results for input(s): PH, PCO2, PO2, HCO3, FIO2 in the last 72 hours. No results for input(s): INR in the last 72 hours. No lab exists for component: INREXT, INREXT, INREXT    Other pertinent lab:          Assessment:     Principal Problem: Bowel obstruction (1/24/2018)    Active Problems:    Bladder cancer ([facility] [address] (8/7/2013)      Nicotine dependence (3/27/2009)      Metastatic disease ([facility] [address] (1/24/2018)      Intractable abdominal pain (1/24/2018)      Chemotherapy-induced neuropathy ([facility] [address] (1/24/2018)      Alcohol abuse (1/24/2018)           Plan:     Risk of deterioration: medium             1. Bowel obstruction: status post surgical intervention. Followed by surgery. 2. Metastatic bladder cancer: I have previously explained to the patient that the imaging studies support progression of his metastatic bladder cancer. His ECOG performance status remains poor. He is not a candidate for any additional systemic therapy. Hospice is recommended. DNR! Total time spent with patient:20 minutes                  Care Plan discussed with: Nursing Staff and Consultant/Specialist    Discussed:  Care Plan    Prophylaxis:  Lovenox    Disposition:   To be dedetmined           ___________________________________________________    Attending Physician: Jacinda Mcgrath MD

## 2018-01-30 NOTE — PROGRESS NOTES
Intern Progress Note  [facility]       Patient: Paola Erickson MRN: [id number]  CSN: [id number]    YOB: 1955  Age: 57 y.o. Sex: male    DOA: 1/24/2018 LOS:  LOS: 6 days                    Subjective:     Acute events: Pt resting in bed. Not in restraints. Alert, responsive, cooperative. Tolerating clears, pain well controlled. Wants to go home. Pt does not remember much from hospitalization, explained need for colostomy and sitter. Review of Systems   Constitutional: Negative for chills and fever. Respiratory: Negative for shortness of breath. Cardiovascular: Negative for chest pain, palpitations and leg swelling. Gastrointestinal: Negative for abdominal pain, nausea and vomiting. Genitourinary: Negative for dysuria. Neurological: Negative for dizziness and headaches. Objective:      Patient Vitals for the past 24 hrs:   Temp Pulse Resp BP SpO2   01/30/18 0359 98.6 °F (37 °C) 72 18 112/75 96 %   01/30/18 0000 97.8 °F (36.6 °C) 65 18 - 100 %   01/29/18 2045 98.4 °F (36.9 °C) 68 20 149/76 100 %   01/29/18 1557 97.6 °F (36.4 °C) 67 21 158/78 100 %   01/29/18 1401 - 63 - 181/84 98 %   01/29/18 1113 97.3 °F (36.3 °C) 73 24 157/73 97 %   01/29/18 0740 97.5 °F (36.4 °C) 62 16 162/81 97 %         Intake/Output Summary (Last 24 hours) at 01/30/18 0711  Last data filed at 01/30/18 0558   Gross per 24 hour   Intake          1343.33 ml   Output             2625 ml   Net         -1281.67 ml       Physical Exam   Constitutional: He is oriented to person, place, and time. He appears cachectic. He is cooperative. No distress. HENT:   Head: Normocephalic and atraumatic. Mouth/Throat: Abnormal dentition. Eyes: EOM are normal. Pupils are equal, round, and reactive to light. Neck: Normal range of motion. Neck supple. Cardiovascular: Normal rate, regular rhythm, normal heart sounds and intact distal pulses.     Pulmonary/Chest: Effort normal and breath sounds normal. Abdominal: Soft. Bowel sounds are normal. He exhibits no distension. There is no tenderness. Stoma site CDI   Genitourinary:   Genitourinary Comments: Patel in place   Musculoskeletal: He exhibits no edema or tenderness. Neurological: He is alert and oriented to person, place, and time. Skin: Skin is warm and dry. Psychiatric: He has a normal mood and affect.  His behavior is normal.       Lab/Data Reviewed:  Recent Results (from the past 24 hour(s))   GLUCOSE, POC    Collection Time: 01/29/18  7:44 AM   Result Value Ref Range    Glucose (POC) 95 70 - 110 mg/dL   EKG, 12 LEAD, INITIAL    Collection Time: 01/29/18  1:49 PM   Result Value Ref Range    Ventricular Rate 58 BPM    Atrial Rate 58 BPM    P-R Interval 126 ms    QRS Duration 80 ms    Q-T Interval 450 ms    QTC Calculation (Bezet) 441 ms    Calculated P Axis 44 degrees    Calculated R Axis 71 degrees    Calculated T Axis 73 degrees    Diagnosis       Sinus bradycardia  Nonspecific T wave abnormality  When compared with ECG of 25-FEB-2016 09:29,  Nonspecific T wave abnormality now evident in Anterior leads  Confirmed by Esperanza Boudreaux MD, Elian Ruiz ([id number]) on 1/29/2018 5:47:28 PM     CARDIAC PANEL,(CK, CKMB & TROPONIN)    Collection Time: 01/29/18  3:28 PM   Result Value Ref Range     39 - 308 U/L    CK - MB 2.0 <3.6 ng/ml    CK-MB Index 1.0 0.0 - 4.0 %    Troponin-I, Qt. 0.02 0.0 - 0.045 NG/ML   EKG, 12 LEAD, INITIAL    Collection Time: 01/29/18  3:55 PM   Result Value Ref Range    Ventricular Rate 69 BPM    Atrial Rate 69 BPM    P-R Interval 128 ms    QRS Duration 80 ms    Q-T Interval 416 ms    QTC Calculation (Bezet) 445 ms    Calculated P Axis 55 degrees    Calculated R Axis 63 degrees    Calculated T Axis 68 degrees    Diagnosis       Normal sinus rhythm  Nonspecific T wave abnormality  Abnormal ECG  When compared with ECG of 29-JAN-2018 13:49,  No significant change was found     POTASSIUM    Collection Time: 01/29/18  6:38 PM   Result Value Ref Range    Potassium 3.2 (L) 3.5 - 5.5 mmol/L   CARDIAC PANEL,(CK, CKMB & TROPONIN)    Collection Time: 01/29/18  9:11 PM   Result Value Ref Range     39 - 308 U/L    CK - MB 2.6 <3.6 ng/ml    CK-MB Index 1.0 0.0 - 4.0 %    Troponin-I, Qt. 0.02 0.0 - 0.045 NG/ML   GLUCOSE, POC    Collection Time: 01/29/18  9:38 PM   Result Value Ref Range    Glucose (POC) 128 (H) 70 - 110 mg/dL   MAGNESIUM    Collection Time: 01/30/18  5:04 AM   Result Value Ref Range    Magnesium 1.8 1.6 - 2.6 mg/dL   CBC WITH AUTOMATED DIFF    Collection Time: 01/30/18  5:04 AM   Result Value Ref Range    WBC 6.1 4.6 - 13.2 K/uL    RBC 4.17 (L) 4.70 - 5.50 M/uL    HGB 13.9 13.0 - 16.0 g/dL    HCT 38.6 36.0 - 48.0 %    MCV 92.6 74.0 - 97.0 FL    MCH 33.3 24.0 - 34.0 PG    MCHC 36.0 31.0 - 37.0 g/dL    RDW 12.6 11.6 - 14.5 %    PLATELET 976 445 - 822 K/uL    MPV 9.9 9.2 - 11.8 FL    NEUTROPHILS 63 40 - 73 %    LYMPHOCYTES 16 (L) 21 - 52 %    MONOCYTES 9 3 - 10 %    EOSINOPHILS 12 (H) 0 - 5 %    BASOPHILS 0 0 - 2 %    ABS. NEUTROPHILS 3.9 1.8 - 8.0 K/UL    ABS. LYMPHOCYTES 1.0 0.9 - 3.6 K/UL    ABS. MONOCYTES 0.6 0.05 - 1.2 K/UL    ABS. EOSINOPHILS 0.7 (H) 0.0 - 0.4 K/UL    ABS.  BASOPHILS 0.0 0.0 - 0.1 K/UL    DF AUTOMATED     METABOLIC PANEL, BASIC    Collection Time: 01/30/18  5:04 AM   Result Value Ref Range    Sodium 143 136 - 145 mmol/L    Potassium 3.2 (L) 3.5 - 5.5 mmol/L    Chloride 113 (H) 100 - 108 mmol/L    CO2 24 21 - 32 mmol/L    Anion gap 6 3.0 - 18 mmol/L    Glucose 95 74 - 99 mg/dL    BUN 5 (L) 7.0 - 18 MG/DL    Creatinine 0.48 (L) 0.6 - 1.3 MG/DL    BUN/Creatinine ratio 10 (L) 12 - 20      GFR est AA >60 >60 ml/min/1.73m2    GFR est non-AA >60 >60 ml/min/1.73m2    Calcium 7.3 (L) 8.5 - 10.1 MG/DL   CARDIAC PANEL,(CK, CKMB & TROPONIN)    Collection Time: 01/30/18  5:04 AM   Result Value Ref Range     39 - 308 U/L    CK - MB 3.1 <3.6 ng/ml    CK-MB Index 1.1 0.0 - 4.0 %    Troponin-I, Qt. 0.02 0.0 - 0.045 NG/ML Scheduled Medications Reviewed:  Current Facility-Administered Medications   Medication Dose Route Frequency    potassium chloride 10 mEq, lidocaine (PF) (XYLOCAINE) 10 mg/mL (1 %) 1 mL in 0.9% sodium chloride 100 mL IVPB   IntraVENous Q1H    haloperidol lactate (HALDOL) injection 2 mg  2 mg IntraMUSCular Q8H    0.9% sodium chloride 1,000 mL with mvi, adult no. 4 with vit K 10 mL, thiamine 584 mg, folic acid 1 mg infusion   IntraVENous Q24H    dextrose 5% - 0.45% NaCl with KCl 20 mEq/L infusion  100 mL/hr IntraVENous CONTINUOUS    famotidine (PF) (PEPCID) injection 20 mg  20 mg IntraVENous DAILY    enoxaparin (LOVENOX) injection 40 mg  40 mg SubCUTAneous Q24H    nicotine (NICODERM CQ) 14 mg/24 hr patch 1 Patch  1 Patch TransDERmal DAILY    sodium chloride (NS) flush 5-10 mL  5-10 mL IntraVENous Q8H         Imaging, microbiology, and EKG/Telemetry:  EKG: Normal sinus rhythm, Nonspecific T wave abnormality    Assessment/Plan     Omar Rosas is a 58 y/o male with PMHx of bladder CA with metastases to the lungs and spine, bowel obstruction, and HLD who is now admitted with bowel obstruction, and for intractable pain, nausea and vomiting. He is post-op day 5 from loop colostomy.       S/P Loop Colostomy for Bowel Obstruction 2/2 metastasis vs post-radiatio changes vs stricture-  Patient has a h/o metatastic bladder cancer. Dr. Olesya Landaverde performed loop colostomy without problems on 1/25/18.  Pt producing liquid stool now, stool is leaking around colostomy site;   - General surgery following, POD #5 s/p loop colostomy, cleared for discharged per surgery  - Surgery to manage leaking colostomy site   - Continue D5 1/2 NS w/ 20 KCl @ 100 cc/hr, reassess daily  - Dilaudid 1mg IV q3h PRN  - IV phenergan 25 mg q6h PRN for nausea/vomiting  - Diet per surgery- advanced to GI lite      Acute Delirium/Alcohol Withdrawal CIWA scores 7 overnight  - CIWA Protocol discontinued due to oversedation   - Continue Ativan 1 mg q4hrs PRN   - Continue Haldol 2mg IM q8h    - Speech therapy consulted- pt passed swallow study      Malignant Bladder Cancer/Urinary Incontinence- Followed by Dr. Singh Vang (Urology), Dr. Jannet Villarreal (Heme-Onc), & Dr. Carlos Fuentes (Radiation Onc) as outpatient. - Heme-Onc consulted- recommended hospice   - Urology consulted- recommended OP FU   - Palliative Care consulted   - Hold home oxybutynin XL 10 mg daily while NPO  - Once medically stable, acute delirium improves, Pt to go home on hospice; see hospice note from 1/25        Hypokalemia  - K 3.6>2.8>3.1>3.3>3.4>3.0>3.1>3.2>3.2  - 40 mEq KCL IV over 4 hours ordered  - Monitor BMP, replete as necessary  - IVF as above      Hypernatremia  - Na 150>148>145>144>143  - IVF as above      Chemotherapy-induced Neuropathy  - Hold home gabapentin 600 mg TID while NPO      Chronic Constipation: Colonoscopy (1/08/18) showed congestion and scarring in the rectum and large load of fecal impaction noted proximal to area of stricture.   - s/p loop colostomy      Nicotine Dependence  - Patient has a 43 pack year history and currently smokes about 1 ppd  - Nicotine patch, 14 mg/24 hrs           Diet: Clear liquid, advancing per surgery   DVT Prophylaxis: Lovenox  Code Status: DNR  Point of Contact: Kaz Pinto, [phone], Sister   Bruno Navarro, [phone], Brother-in-Law      Disposition and anticipated LOS: >2 midnights    Latha Guzman MD, PGY-1  [facility]  01/30/18 7:11 AM

## 2018-01-30 NOTE — ROUTINE PROCESS
Bedside and Verbal shift change report given to Usama Ferrara RN (oncoming nurse) by Jaison Phelps RN   (offgoing nurse). Report included the following information SBAR, Kardex, Intake/Output and MAR.

## 2018-01-30 NOTE — PROGRESS NOTES
Problem: Self Care Deficits Care Plan (Adult)  Goal: *Acute Goals and Plan of Care (Insert Text)  Occupational Therapy Goals  Initiated 1/26/2018 within 7 day(s). 1.  Patient will perform grooming with supervision/set-up   2. Patient will perform upper body dressing with supervision/set-up. 3.  Patient will perform lower body dressing with supervision/set-up with adaptive strategies   4. Patient will perform toilet transfers with supervision/set-up. 5.  Patient will perform all aspects of toileting with supervision/set-up. 6.  Patient will participate in upper extremity therapeutic exercise/activities with supervision/set-up in preparation for self care tasks   Outcome: Progressing Towards Goal  Occupational Therapy TREATMENT    Patient: Clearence Soulier (52 y.o. male)  Date: 1/30/2018  Diagnosis: SBO (small bowel obstruction)  Abdominal pain  Bladder cancer ([facility] [address]  Metastatic disease ([facility] [address]  Intractable abdominal pain  DX Bowel obstruction  Procedure(s) (LRB):  LOOP COLOSTOMY FROM DESCENDING COLON (N/A) 5 Days Post-Op  Precautions: Aspiration  Chart, occupational therapy assessment, plan of care, and goals were reviewed. ASSESSMENT:  Pt's nurse Cathy Loggirma gave verbal permission to see pt, reporting that ostomy bag is leaking, and she will be in to clean the pt/change it after the session. Pt is alert, presents w/sitter at bedside, noted to have ostomy bag leaked on pt's gown, sheets and pads. Pt maneuvered to EOB w/Min A, towel placed over pt's ostomy bag. Pt tolerated sit EOB for ~5 min following which pt's towel came off and ostomy bag has leaked on the floor. Pt also reports stool coming from the bottom. Pt maneuvered back to sup, pt's nurse Cathy Rice is present in room to perform ostomy care.  Pt was able to maneuver side-to-side in bed for graciela-care and UB dressing (changing gown) and for sheets/pads replacement x4 times, requiring Min A for use of bed rails, however was able to maintain side-lying position independently. Pt follows commands well this session, very calm and cooperative. Pt left comfortable at the end of the session w/sitter present. Progression toward goals:  []          Improving appropriately and progressing toward goals  [x]          Improving slowly and progressing toward goals  []          Not making progress toward goals and plan of care will be adjusted     PLAN:  Patient continues to benefit from skilled intervention to address the above impairments. Continue treatment per established plan of care. Discharge Recommendations:  Rafael Acunauel  Further Equipment Recommendations for Discharge:  N/A      G-CODES:     Self Care  Current  CK= 40-59%   Goal  CI= 1-19%. The severity rating is based on the Level of Assistance required for Functional Mobility and ADLs. SUBJECTIVE:   Patient stated That stuff just keeps coming out.     OBJECTIVE DATA SUMMARY:   Cognitive/Behavioral Status:  Neurologic State: Alert  Orientation Level: Oriented to person, Oriented to place  Cognition: Follows commands  Safety/Judgement: Fall prevention    Functional Mobility and Transfers for ADLs:   Bed Mobility:  Rolling: Minimum assistance  Supine to Sit: Minimum assistance  Sit to Supine: Stand-by asssistance   Balance:  Sitting: Impaired; With support  Sitting - Static: Good (unsupported)  Sitting - Dynamic: Fair (occasional)    ADL Intervention:   Grooming  Grooming Assistance: Stand-by assistance (seated EOB)  Washing Face: Stand-by assistance (simulated)  Washing Hands: Stand-by assistance  Brushing/Combing Hair: Stand-by assistance (simulated)  Upper Body [address]: Contact guard assistance (supine)    Lower Body Dressing Assistance  Socks: Maximum assistance  Pain:  Pt reports 0/10 pain or discomfort prior to treatment.    Pt reports 0/10 pain or discomfort post treatment.      Activity Tolerance:    Fair, limited 2/2 ostomy bag issues    Please refer to the flowsheet for vital signs taken during this treatment.   After treatment:   []  Patient left in no apparent distress sitting up in chair  [x]  Patient left in no apparent distress in bed  [x]  Call bell left within reach  [x]  Nursing notified  [x]  Caregiver present  []  Bed alarm activated  MODESTA Chavira  Time Calculation: 40 mins

## 2018-01-30 NOTE — PROGRESS NOTES
Have called and LM for [facility] 3 X with no return call back regarding if able to accept.   Pending call back  CM will continue to follow to assist with DC needs identified

## 2018-01-30 NOTE — ROUTINE PROCESS
Bedside and Verbal shift change report given to Emeterio Palomino (oncoming nurse) by Latasha Haq RN   (offgoing nurse). Report included the following information SBAR, Kardex, ED Summary, Procedure Summary, Intake/Output, MAR, Recent Results and Med Rec Status.

## 2018-01-30 NOTE — PROGRESS NOTES
Received communication from IDT regarding patient ready for DC today and thinking is to go to a SNF as recc by PT/OT. Met with patient at bedside to discuss DC disposition, was not clear on what I was saying and asked that I call and discuss with their sister, Clemente Pepe, [phone]. Called Clemente Pepe to discuss as requested. Patient was getting ready to be admitted to Hospice with [facility] prior to admission and was plan for post DC. However, PT/OT recc SNF prior to return home for strengthening. Informed Clemente Pepe that if is sent to SNF as Hospice there will be a daily charge for room and board that would not be covered by insurance. Further discussed that if patient were not admitted by hospice was possibility that could be sent to SNF, short term, prior to DC home and then have hospice admit post DC from SNF if accepted. Verbalized understanding and asked that patient be referred to [facility] SNF. Clemente Pepe also reported that patient lives alone and does not have anyone there with them, stated she is nearby, however is sick at present time with flu. Referral sent via computer and also LM for Ameya Giraldo to notify of referral and requested call back, pending review at this time. Also called and talke to Heaven Do RN, with [facility], #[phone], and discussed what IDT and sister are requesting at this time, verbalized understanding and plans to come to see patient today. CM will continue to follow with IDT to assist with DC needs identified/ F/U with [facility] SNF.

## 2018-01-30 NOTE — CONSULTS
Urology Consult Note    Patient: Ashia Blanco               Sex: male          DOA: 1/24/2018         YOB: 1955      Age:  57 y.o.        LOS:  LOS: 5 days              Referring physician: Rey Latham  Reason for consult: Bladder cancer      Assessment   This is a 57 y.o. male with-  1) Metastatic Bladder    2) Gross hematuria  Likely 2/2 to above      Plan   1. Hematuria -  Has 16 fr winslow in place. Continue for now. Irrigate PRN. 2. Will arrange fu with Dr. Katharina Waite for continued management of bladder cancer       HPI:     Ashia Blanco is a 57 y.o. male who has been is currently admitted with  Bowel obstruction. H/o recently diagnosed metastatic bladder cancer. Pod 4 from loop colostomy. Now w hematuria. Winslow in place draining well.      Past Medical History:   Diagnosis Date    Acute sinusitis, unspecified     Alcohol abuse, unspecified     Bladder cancer ([facility] [address] 2013    chemo & radiation    Cough     Depressive disorder     Hematuria     HLD (hyperlipidemia)     Impotence of organic origin     Insomnia, unspecified     Other abnormal glucose     Other seborrheic keratosis     Pain in joint, shoulder region        Past Surgical History:   Procedure Laterality Date    COLONOSCOPY N/A 1/8/2018    COLONOSCOPY with disimpaction performed by Jacy Clark MD at [address] [place] [address] N/A 01/25/2018    Dr. Jing Leon  12/9/2014    Dr. Cortez Agrawal HX OTHER SURGICAL  01/04/2017    Tore (R) arm tendon, had surgery at [address]  01/04/13    [facility], TURBT - low-grade stage TA, Dr Brook Su  05/27/14    [facility], Cysto-TURP, Transurethral resection/multiple bladder biopsies - Papillary Urothelial Hyperplasia, Dr Florinda Starkey  1/29/15    Dr. Simba Gann - clot evacuation/fulguration       Family History   Problem Relation Age of Onset    Hypertension Mother     Kidney Disease Father     Heart Disease Father     Diabetes Sister     Hypertension Brother     Stroke Brother        Social History     Social History    Marital status:      Spouse name: N/A    Number of children: N/A    Years of education: N/A     Social History Main Topics    Smoking status: Current Some Day Smoker     Packs/day: 1.00     Years: 43.00     Types: Cigarettes    Smokeless tobacco: Never Used      Comment: using electronic cigarettes & nicotine patch    Alcohol use 0.0 oz/week      Comment: occassional    Drug use: No    Sexual activity: Not Asked     Other Topics Concern    None     Social History Narrative       Prior to Admission medications    Medication Sig Start Date End Date Taking? Authorizing Provider   docusate sodium (COL-RITE) 100 mg capsule Take 100 mg by mouth daily. Yes Historical Provider   polyethylene glycol (MIRALAX) 17 gram packet Take 17 g by mouth daily. Yes Historical Provider   fentaNYL (DURAGESIC) 25 mcg/hr PATCH 1 Patch by TransDERmal route every seventy-two (72) hours. Yes Historical Provider   oxyCODONE IR (ROXICODONE) 10 mg tab immediate release tablet take 1 tablet by mouth four times a day if needed 12/1/17   Historical Provider   oxybutynin chloride XL (DITROPAN XL) 10 mg CR tablet Take 1 Tab by mouth daily. 10/11/17   Heriberto Aguero MD   gabapentin (NEURONTIN) 300 mg capsule Take 600 mg by mouth three (3) times daily.  Indications: NEUROPATHIC PAIN 1/19/17   Historical Provider       Allergies   Allergen Reactions    Codeine Nausea and Vomiting     \"ONLY IN VERY HIGH DOSES\"        Macrodantin [Nitrofurantoin Macrocrystal] Diarrhea, Nausea and Vomiting and Other (comments)     Made pt feel very confused    Other Plant, Animal, Environmental Other (comments)    Oxycodone Nausea and Vomiting     Other reaction(s): gi distress  \"ONLY IN VERY HIGH DOSES\"  \"ONLY IN VERY HIGH DOSES\"      Percocet [Oxycodone-Acetaminophen] Nausea and Vomiting     \"ONLY IN VERY HIGH DOSES\"        Pollen Extracts Runny Nose    Pravachol [Pravastatin] Nausea Only     Other reaction(s): gi distress    Vicodin [Hydrocodone-Acetaminophen] Nausea and Vomiting     Other reaction(s): gi distress  \"ONLY IN VERY HIGH DOSES\"  \"ONLY IN VERY HIGH DOSES\"      Zoloft [Sertraline] Nausea and Vomiting     Other reaction(s): gi distress       Review of Systems  Not obtainable due to patient factors. Physical Exam:      Visit Vitals    /78 (BP 1 Location: Right arm, BP Patient Position: At rest)    Pulse 67    Temp 97.6 °F (36.4 °C)    Resp 21    Ht 5' 9\" (1.753 m)    Wt 147 lb 1.6 oz (66.7 kg)    SpO2 100%    BMI 21.72 kg/m2       Physical Exam:   GENERAL: alert, cooperative, no distress, appears stated age  LUNG: unlabored breathing  ABDOMEN: soft, non-tender. Bowel sounds normal. No masses,  no organomegaly  EXTREMITIES:  extremities normal, atraumatic, no cyanosis or edema  SKIN: no jaundice  : no CVA tenderness      Lab/Data Review:  BMP: Lab Results   Component Value Date/Time     01/29/2018 05:29 AM    K 3.1 (L) 01/29/2018 05:29 AM     (H) 01/29/2018 05:29 AM    CO2 24 01/29/2018 05:29 AM    AGAP 7 01/29/2018 05:29 AM    GLU 85 01/29/2018 05:29 AM    BUN 4 (L) 01/29/2018 05:29 AM    CREA 0.42 (L) 01/29/2018 05:29 AM    GFRAA >60 01/29/2018 05:29 AM    GFRNA >60 01/29/2018 05:29 AM     CBC: Lab Results   Component Value Date/Time    WBC 5.7 01/29/2018 05:29 AM    HGB 13.7 01/29/2018 05:29 AM    HCT 39.7 01/29/2018 05:29 AM     01/29/2018 05:29 AM     COAGS: No results found for: APTT, PTP, INR       CT Results:    Results from Hospital Encounter encounter on 01/24/18   CT ABD PELV W CONT   Narrative PROCEDURE:  CT Abdomen-Pelvis with Contrast.    INDICATION:  Stomach pain, obstructed bowel, vomiting, history of bladder CA  with metastasis. COMPARISON:  CT abdomen without and with contrast 10/23/17.   CT abdomen-pelvis  without and with contrast 10/6/17. TECHNIQUE:  Helical volumetric CT imaging of the abdomen and pelvis is performed  at 5 mm axial sections following IV contrast administration. Coronal and  sagittal multiplanar reconstruction images are generated for improved anatomic  delineation.    - IV contrast dose:  100 mL Isovue-300. - Radiation dose:   mGy-cm.  - All CT scans at this facility are performed using dose optimization technique  as appropriate to the performed exam, to include automated exposure control,  adjustment of the mA and/or kV according to patient's size (Including  appropriate matching for site-specific examinations), or use of iterative  reconstruction technique. FINDINGS:    ABDOMEN    Lung Bases:      - Bandlike density in the left lower lobe. Dependent changes in both lower  lobes. - Scattered nodular densities at both lung bases, i.e 1.5 x 0.6 cm (axial #11)  and 0.4 x 0.3 cm (axial #11) in the right lung base, 0.4 x 0.4 cm right upper  lobe (axial #1), 0.3 x 0.2 cm (axial #7) and 0.2 x 0.1 cm (axial #7) in the  right middle lobe, 0.5 x 0.4 cm (axial #13) and 0.3 x 0.2 cm (axial #13) in the  lingular region, 0.4 x 0.3 cm (axial #9) and 1.0 x 0.8 cm (axial #11) in the  left lower lobe. Additional even tinier nodular densities are also identified  at both lung bases. Liver:  Diffuse heterogeneous appearance of the liver. Questionable abnormal  enhancing focus in the right lobe measuring 1.7 x 1.5 cm (axial #18). Mild  perihepatic ascites. Diffuse hepatic steatosis. Spleen:  Minimal perisplenic fluid. The spleen itself appears unremarkable. Pancreas, Gallbladder, Adrenal Glands:  Unremarkable. Spleen:  Mild hydronephrosis bilaterally. Both ureters are mildly dilated  throughout their courses. Gastrointestinal Tract:  Unremarkable stomach. Dilated small bowel loops. No  evidence for acute small bowel obstruction.   Distention of the ascending colon,  transverse colon and descending colon. Sigmoid is also distended. At the  rectosigmoid junction region, a focal area of transition in the caliber of the  colon is demonstrated (axial #74-76), with abnormal colon wall thickening. This  area appears to show increased degree of colon wall thickening compared to  10/6/17. Multiple subtle perirectal lymph nodes. Fairly extensive perirectal  fat stranding. Retroperitoneum:  Enlarged retroperitoneal nodes. The most pronounced left  para-aortic node measures about 2.1 x 1.8 cm (axial #41). This node was about  1.6 x 1.5 cm on 10/6/17. Multiple nodes along the iliac chain. Overall similar  in conspicuity compared to 10/6/17. Vascular:  Non-aneurysmal aorta. Moderate atheromatous plaque calcifications. PELVIS    Urinary Bladder:  Diffuse bladder wall thickening. Presumably related to the  patient's diagnosis of bladder CA. Similar appearance compared to 10/6/17. Prostate:  Heterogeneous enhancement of the prostate. Mild enlargement of the  prostate. Rectum:  Abnormal mucosal enhancement and peripheral enhancement around the  thickened rectum. The extent of rectal wall thickening is increased compared to  10/6/17. Increased perirectal fat stranding. Nodes:  Enlarged right groin node, new compared to 10/6/17. The enlarged right  groin node measures about 2.2 x 1.9 cm is. Slightly prominent multiple left  groin nodes, with the largest measuring about 1.2 x 0.9 cm. Increased in  conspicuity compared to 10/6/17. Bones:  Developing lytic lesion at L5 inapparent on 10/6/17. The lytic lesion  measures about 4.1 x 2.0 cm (axial #60) additional scattered lytic foci, i.e.  right iliac bone measuring 0.7 x 0.7 cm (axial #60)         Impression IMPRESSION:    1.   Developing colonic obstruction with focal transition at the rectosigmoid  junction region and underlying abnormal rectal wall thickening, enhancement and  increased perirectal fat stranding.    - Possibly related to developing rectal carcinoma vs. postradiation change vs.  stricture development from chronic inflammatory process. This is probably the  source for the colonic obstruction. 2.  Abnormal appearance of the ureters and bladder. Most likely related to the  patient's known bladder CA.    - Enlarged prostate with heterogeneous enhancement. 3.  Increasing adenopathy. The right groin node is distinctly new compared to  the previous study. Increasing number of enhancing nodes in both groin regions. 4.  Developing lytic lesions in the skeletal structures suggestive of skeletal  metastasis. 5.  Multiple lung nodules. US Results:    Results from [place] encounter on 12/11/08   US RENAL   Narrative ULT 1410 - RENAL   -  DEC 11 2008  RESULTS:  HISTORY: HEMATURIA. IMPRESSION:   WITHIN NORMAL LIMITS. COMMENT: SONOGRAPHY OF THE KIDNEYS AND BLADDER WAS PERFORMED. THE RIGHT KIDNEY MEASURES 11.5 AND THE LEFT 11.4 CM IN THE CRANIAL CAUDAL  DIMENSION. THERE IS NO EVIDENCE OF RENAL MASS  CALCULI  OR  HYDRONEPHROSIS.  THE BLADDER IS SONOGRAPHICALLY UNREMARKABLE.  [place]:[facility] [place]  INTERPRETING PHYSICIAN: Minh Lion SIGNED  BY / DATE: Minh Mejia M.D. [facility] 11 2008  4:46P        Kenyon Gonzalez MD    Pager: [phone]  Office: [phone]

## 2018-01-30 NOTE — PROGRESS NOTES
Problem: Mobility Impaired (Adult and Pediatric)  Goal: *Acute Goals and Plan of Care (Insert Text)  Physical Therapy Goals  Initiated 1/25/2018 and to be accomplished within 7 day(s)  1. Patient will move from supine to sit and sit to supine, scoot up and down and roll side to side in bed with supervision/set-up. 2.  Patient will transfer from bed to chair and chair to bed with supervision/set-up using the least restrictive device. 3.  Patient will perform sit to stand with supervision/set-up. 4.  Patient will ambulate with supervision/set-up for >50 feet with the least restrictive device. 5.  Patient will ascend/descend 2 stairs with 1 handrail(s) with supervision/set-up. Time: 1510     Pt is found in bed resting with sitter at bedside. Pt's sitter reports that pt has been restless and somewhat agitated resulting in recent dose of ativan. Pt is now sleeping and as a result of aforementioned events will allow pt to rest at this time. Will follow up as pt availability allows.     Mirian Wood PTA  1/30/2018

## 2018-01-30 NOTE — PROGRESS NOTES
NUTRITION    Nursing Referral: [facility]  Nutrition Consult: General Nutrition Management & Supplements     RECOMMENDATIONS / PLAN:     - Add nutritional supplements: Ensure Enlive TID  - Continue IVF and banana bag as needed until adequate oral intake. - Continue RD inpatient monitoring and evaluation. NUTRITION INTERVENTIONS & DIAGNOSIS:     [x] IV fluid: continue, D5 1/2 NS at 100 mL/hr (120 gm dextrose, 408 kcal/day)  [x] Meals/Snacks: modified composition  [x] Medical food supplement therapy: initiate  [x] Vitamin and mineral supplementation: continue, banana bag    Nutrition Diagnosis: Unintentional weight loss related to inadequate energy intake and altered GI function as evidenced by patient unable to tolerate po with nausea/vomiting resulting in 24 lb, 15% weight loss x 4 months. Patient meets criteria for Severe Protein Calorie Malnutrition as evidenced by:   ASPEN Malnutrition Criteria  Acute Illness, Chronic Illness, or Social/Enviornmental: Chronic illness  Energy Intake: <75% est energy req for greater than/equal to 1 month  Weight Loss: Greater than 10% x 6 mos  ASPEN Malnutrition Score - Chronic Illness: 7  Chronic Illness - Malnutrition Diagnosis: Severe malnutrition. ASSESSMENT:     1/30: Started on diet this morning, had not eaten yet. Unsure of appetite, but states he is a little hungry. Discussed nutritional supplements, pt agreeable. Discussed continuing them on discharge. BG levels in good control, do not suggest diabetic supplements at this time. 1/28: Pt remains NPO. Had leaking around colostomy bag. Hospice following. Discussed nutrition status and TPN recommendation with Dr Gauri Gibbs ([facility] [place] [facility]). Per MD, no plan to start oral diet today. Pt not medically appropriate for TPN per MD. Plan for pt to resume Hospice with comfort care following discharge. Addendum 1357: Received nutrition consult fom Dr Christopher Prabhakar for TPN.  Discussed plan of care goals for pt to resume Hospice with comfort following discharge. Per MD, will not start TPN and MD to discontinue consult order. 1/26: Remains NPO. S/p loop descending colostomy d/t large bowel obstruction. Pt pulled out NGT this am. Not replaced yet. Per discussion with MD no plan for oral diet today and discussed PN recommendation. 1/25: Pt c/o abdominal pain, nausea and constipation with poor po intake PTA, unable to keep food down for the past 4-5 days. Developing colonic obstruction per CT, Surgery and Palliative Medicine consulted. Average po intake adequate to meet patients estimated nutritional needs:   [] Yes     [x] No   [] Unable to determine at this time    Diet: DIET GI LITE (POST SURGICAL)      Food Allergies: NKFA  Current Appetite:   [] Good     [x] Fair     [] Poor     [] Other:   Appetite/meal intake prior to admission:   [] Good     [] Fair     [x] Poor, unable to tolerate any food or drink with nausea/vomiting x 4-5 days PTA and poor to fair meal intake before then for the last several months     [] Other:  Feeding Limitations:  [] Swallowing difficulty    [] Chewing difficulty    [] Other:  Current Meal Intake: Patient Vitals for the past 100 hrs:   % Diet Eaten   01/30/18 0807 10 %   01/29/18 1826 50 %   01/29/18 1401 0 %   01/29/18 0800 0 %     BM: 1/30, colostomy   Skin Integrity: WDL  Edema: none   Pertinent Medications: Reviewed: phenergan, thiamine, KCl, banana bag    Recent Labs      01/30/18   0504  01/29/18   1838  01/29/18   0529   01/28/18   1308  01/28/18   0341   NA  143   --   144   --    --   145   K  3.2*  3.2*  3.1*   < >  3.1*  3.4*   CL  113*   --   113*   --    --   113*   CO2  24   --   24   --    --   27   GLU  95   --   85   --    --   83   BUN  5*   --   4*   --    --   6*   CREA  0.48*   --   0.42*   --    --   0.48*   CA  7.3*   --   7.3*   --    --   7.3*   MG  1.8   --   2.2   --   1.4*   --     < > = values in this interval not displayed.        Intake/Output Summary (Last 24 hours) at 01/30/18 1209  Last data filed at 01/30/18 0848   Gross per 24 hour   Intake          1463.33 ml   Output             3650 ml   Net         -2186.67 ml       Anthropometrics:  Ht Readings from Last 1 Encounters:   01/24/18 5' 9\" (1.753 m)     Last 3 Recorded Weights in this Encounter    01/27/18 0400 01/29/18 0444 01/30/18 0413   Weight: 66.3 kg (146 lb 3.2 oz) 66.7 kg (147 lb 1.6 oz) 66.7 kg (147 lb 1.5 oz)     Body mass index is 21.72 kg/(m^2). Weight History: per patient and family- significant weight loss, down from previous weight of 165 lb in September 2017 (24 lb, 15% weight loss x 4 months)    Weight Metrics 1/30/2018 1/24/2018 1/8/2018 12/6/2017 9/28/2017 9/19/2017 6/7/2017   Weight 147 lb 1.5 oz - 146 lb 150 lb 164 lb 164 lb 165 lb   BMI - 21.72 kg/m2 21.56 kg/m2 22.15 kg/m2 24.22 kg/m2 24.22 kg/m2 24.37 kg/m2        Admitting Diagnosis: SBO (small bowel obstruction)  Abdominal pain  Bladder cancer (HCC)  Metastatic disease (HCC)  Intractable abdominal pain  DX  Pertinent PMHx: metastatic bladder cancer, DM, chronic constipation, alcohol abuse, chemotherapy induced neuropathy    Education Needs:        [x] None identified  [] Identified - Not appropriate at this time  []  Identified and addressed - refer to education log  Learning Limitations:   [x] None identified  [] Identified    Cultural, Quaker & ethnic food preferences:  [x] None identified    [] Identified and addressed     ESTIMATED NUTRITION NEEDS:     Calories: 3427-3524 kcal (HBEx1.2-1.4) based on  [x] Actual BW 64 kg     [] IBW   Protein: 64-96 gm (1-1.5 gm/kg) based on  [x] Actual BW      [] IBW   Fluid: 1 mL/kcal     MONITORING & EVALUATION:     Nutrition Goal(s):   1. Nutritional needs will be met through adequate oral intake or nutrition support within the next 7 days.   Outcome:  [] Met/Ongoing    [x]  Not Met    [] New/Initial Goal     Monitoring:   [x] Food and beverage intake   [x] Diet order   [x] Nutrition-focused physical findings   [x] Treatment/therapy   [] Weight   [] Enteral nutrition intake        Previous Recommendations (for follow-up assessments only):     [x]   Implemented       []   Not Implemented (RD to address)      [] No Longer Appropriate     [] No Recommendation Made     Discharge Planning: regular diet + Ensure 2-3 times daily  [x] Participated in care planning, discharge planning, & interdisciplinary rounds as appropriate      Elwin Alpers, RD, [address] [place]   Pager: [phone]

## 2018-01-31 LAB
ANION GAP SERPL CALC-SCNC: 7 MMOL/L (ref 3–18)
BASOPHILS # BLD: 0 K/UL (ref 0–0.1)
BASOPHILS NFR BLD: 0 % (ref 0–2)
BUN SERPL-MCNC: 4 MG/DL (ref 7–18)
BUN/CREAT SERPL: 8 (ref 12–20)
CALCIUM SERPL-MCNC: 8.1 MG/DL (ref 8.5–10.1)
CHLORIDE SERPL-SCNC: 113 MMOL/L (ref 100–108)
CO2 SERPL-SCNC: 23 MMOL/L (ref 21–32)
CREAT SERPL-MCNC: 0.51 MG/DL (ref 0.6–1.3)
DIFFERENTIAL METHOD BLD: ABNORMAL
EOSINOPHIL # BLD: 0.7 K/UL (ref 0–0.4)
EOSINOPHIL NFR BLD: 8 % (ref 0–5)
ERYTHROCYTE [DISTWIDTH] IN BLOOD BY AUTOMATED COUNT: 12.7 % (ref 11.6–14.5)
GLUCOSE SERPL-MCNC: 119 MG/DL (ref 74–99)
HCT VFR BLD AUTO: 39.7 % (ref 36–48)
HGB BLD-MCNC: 14.1 G/DL (ref 13–16)
LYMPHOCYTES # BLD: 1 K/UL (ref 0.9–3.6)
LYMPHOCYTES NFR BLD: 12 % (ref 21–52)
MAGNESIUM SERPL-MCNC: 1.7 MG/DL (ref 1.6–2.6)
MCH RBC QN AUTO: 32.6 PG (ref 24–34)
MCHC RBC AUTO-ENTMCNC: 35.5 G/DL (ref 31–37)
MCV RBC AUTO: 91.9 FL (ref 74–97)
MONOCYTES # BLD: 0.9 K/UL (ref 0.05–1.2)
MONOCYTES NFR BLD: 10 % (ref 3–10)
NEUTS SEG # BLD: 5.9 K/UL (ref 1.8–8)
NEUTS SEG NFR BLD: 70 % (ref 40–73)
PLATELET # BLD AUTO: 226 K/UL (ref 135–420)
PMV BLD AUTO: 10 FL (ref 9.2–11.8)
POTASSIUM SERPL-SCNC: 3.3 MMOL/L (ref 3.5–5.5)
RBC # BLD AUTO: 4.32 M/UL (ref 4.7–5.5)
SODIUM SERPL-SCNC: 143 MMOL/L (ref 136–145)
VIT B1 BLD-SCNC: 248.7 NMOL/L (ref 66.5–200)
WBC # BLD AUTO: 8.5 K/UL (ref 4.6–13.2)

## 2018-01-31 PROCEDURE — 97116 GAIT TRAINING THERAPY: CPT

## 2018-01-31 PROCEDURE — 74011250636 HC RX REV CODE- 250/636: Performed by: FAMILY MEDICINE

## 2018-01-31 PROCEDURE — 97530 THERAPEUTIC ACTIVITIES: CPT

## 2018-01-31 PROCEDURE — 74011000250 HC RX REV CODE- 250: Performed by: FAMILY MEDICINE

## 2018-01-31 PROCEDURE — 80048 BASIC METABOLIC PNL TOTAL CA: CPT

## 2018-01-31 PROCEDURE — 36415 COLL VENOUS BLD VENIPUNCTURE: CPT

## 2018-01-31 PROCEDURE — 74011000258 HC RX REV CODE- 258: Performed by: STUDENT IN AN ORGANIZED HEALTH CARE EDUCATION/TRAINING PROGRAM

## 2018-01-31 PROCEDURE — 83735 ASSAY OF MAGNESIUM: CPT

## 2018-01-31 PROCEDURE — 97535 SELF CARE MNGMENT TRAINING: CPT

## 2018-01-31 PROCEDURE — 74011250637 HC RX REV CODE- 250/637: Performed by: STUDENT IN AN ORGANIZED HEALTH CARE EDUCATION/TRAINING PROGRAM

## 2018-01-31 PROCEDURE — 97110 THERAPEUTIC EXERCISES: CPT

## 2018-01-31 PROCEDURE — 77030010811

## 2018-01-31 PROCEDURE — 74011250637 HC RX REV CODE- 250/637: Performed by: FAMILY MEDICINE

## 2018-01-31 PROCEDURE — 77030011256 HC DRSG MEPILEX <16IN NO BORD MOLN -A

## 2018-01-31 PROCEDURE — 74011000250 HC RX REV CODE- 250: Performed by: EMERGENCY MEDICINE

## 2018-01-31 PROCEDURE — 85025 COMPLETE CBC W/AUTO DIFF WBC: CPT

## 2018-01-31 PROCEDURE — 77030011641 HC PASTE OST ADH BMS -A

## 2018-01-31 PROCEDURE — 65660000004 HC RM CVT STEPDOWN

## 2018-01-31 PROCEDURE — 74011250636 HC RX REV CODE- 250/636: Performed by: STUDENT IN AN ORGANIZED HEALTH CARE EDUCATION/TRAINING PROGRAM

## 2018-01-31 PROCEDURE — 77030010522

## 2018-01-31 PROCEDURE — 77030010520

## 2018-01-31 RX ORDER — HYDROMORPHONE HYDROCHLORIDE 2 MG/1
2 TABLET ORAL
Status: DISCONTINUED | OUTPATIENT
Start: 2018-01-31 | End: 2018-02-01 | Stop reason: HOSPADM

## 2018-01-31 RX ORDER — POTASSIUM CHLORIDE 1.5 G/1.77G
20 POWDER, FOR SOLUTION ORAL
Status: COMPLETED | OUTPATIENT
Start: 2018-01-31 | End: 2018-01-31

## 2018-01-31 RX ORDER — ONDANSETRON 4 MG/1
4 TABLET, ORALLY DISINTEGRATING ORAL
Status: DISCONTINUED | OUTPATIENT
Start: 2018-01-31 | End: 2018-02-01 | Stop reason: HOSPADM

## 2018-01-31 RX ORDER — HALOPERIDOL 5 MG/ML
2 INJECTION INTRAMUSCULAR DAILY
Status: DISCONTINUED | OUTPATIENT
Start: 2018-02-01 | End: 2018-02-01 | Stop reason: HOSPADM

## 2018-01-31 RX ORDER — FENTANYL 50 UG/1
1 PATCH TRANSDERMAL
Status: DISCONTINUED | OUTPATIENT
Start: 2018-01-31 | End: 2018-01-31

## 2018-01-31 RX ORDER — FENTANYL 25 UG/1
1 PATCH TRANSDERMAL
Status: DISCONTINUED | OUTPATIENT
Start: 2018-01-31 | End: 2018-02-01 | Stop reason: HOSPADM

## 2018-01-31 RX ORDER — HYDROMORPHONE HYDROCHLORIDE 2 MG/ML
1 INJECTION, SOLUTION INTRAMUSCULAR; INTRAVENOUS; SUBCUTANEOUS ONCE
Status: COMPLETED | OUTPATIENT
Start: 2018-01-31 | End: 2018-01-31

## 2018-01-31 RX ADMIN — Medication 10 ML: at 13:26

## 2018-01-31 RX ADMIN — FOLIC ACID: 5 INJECTION, SOLUTION INTRAMUSCULAR; INTRAVENOUS; SUBCUTANEOUS at 02:58

## 2018-01-31 RX ADMIN — FAMOTIDINE 20 MG: 10 INJECTION INTRAVENOUS at 08:17

## 2018-01-31 RX ADMIN — PROMETHAZINE HYDROCHLORIDE 25 MG: 25 INJECTION INTRAMUSCULAR; INTRAVENOUS at 08:18

## 2018-01-31 RX ADMIN — HYDROMORPHONE HYDROCHLORIDE 2 MG: 2 TABLET ORAL at 21:36

## 2018-01-31 RX ADMIN — POTASSIUM CHLORIDE 20 MEQ: 1.5 POWDER, FOR SOLUTION ORAL at 11:00

## 2018-01-31 RX ADMIN — ONDANSETRON 4 MG: 4 TABLET, ORALLY DISINTEGRATING ORAL at 21:36

## 2018-01-31 RX ADMIN — POTASSIUM CHLORIDE 20 MEQ: 1.5 POWDER, FOR SOLUTION ORAL at 10:56

## 2018-01-31 RX ADMIN — POTASSIUM CHLORIDE 20 MEQ: 1.5 POWDER, FOR SOLUTION ORAL at 12:25

## 2018-01-31 RX ADMIN — HYDROMORPHONE HYDROCHLORIDE 1 MG: 2 INJECTION, SOLUTION INTRAMUSCULAR; INTRAVENOUS; SUBCUTANEOUS at 12:25

## 2018-01-31 RX ADMIN — ENOXAPARIN SODIUM 40 MG: 40 INJECTION SUBCUTANEOUS at 21:36

## 2018-01-31 RX ADMIN — Medication 10 ML: at 06:00

## 2018-01-31 RX ADMIN — HALOPERIDOL LACTATE 2 MG: 5 INJECTION, SOLUTION INTRAMUSCULAR at 02:59

## 2018-01-31 RX ADMIN — DEXTROSE MONOHYDRATE, SODIUM CHLORIDE, AND POTASSIUM CHLORIDE 100 ML/HR: 50; 4.5; 1.49 INJECTION, SOLUTION INTRAVENOUS at 21:35

## 2018-01-31 RX ADMIN — HYDROMORPHONE HYDROCHLORIDE 2 MG: 2 TABLET ORAL at 16:12

## 2018-01-31 RX ADMIN — HYDROMORPHONE HYDROCHLORIDE 1 MG: 1 INJECTION, SOLUTION INTRAMUSCULAR; INTRAVENOUS; SUBCUTANEOUS at 08:17

## 2018-01-31 NOTE — PROGRESS NOTES
Dinesh Person M.D. FACS  PROGRESS NOTE    Name: Sowmya Frey MRN: [id number]   : 1955 [facility]   Date: 2018 Admission Date: 2018  2:12 PM     Hospital Day: 8  6 Days Post-Op  Subjective:  Patient sitting upright in bed this morning. He complains of abdominal pain and nausea. The pain is generalized. But mostly through the lower abdomen. The nausea he says this with the smells and with food. He also complained about the ostomy bag having to be changed 4 times yesterday. Objective:  Vitals:    18 1555 18 1942 18 2335 18 0515   BP: 129/86 131/69 123/69 159/67   Pulse: 98 86 71    Resp:    Temp: 98.8 °F (37.1 °C) 97.9 °F (36.6 °C) 97.7 °F (36.5 °C) 97.7 °F (36.5 °C)   SpO2: 96% 95% 92% 95%   Weight:       Height:           Date 18 0700 - 18 0659 18 0700 - 18 0659   Shift 8414-2186 6296-0600 24 Hour Total 8120-7737 0390-7537 24 Hour Total   I  N  T  A  K  E   P.O. 270  270         P.O. 270  270       I.V.  (mL/kg/hr) 6637.9  (8.3) 100  (0.1) 6737.9  (4.2)         Volume (0.9% sodium chloride 1,000 mL with mvi, adult no. 4 with vit K 10 mL, thiamine 754 mg, folic acid 1 mg infusion) 4281.3 0 4281.3         Volume (dextrose 5% - 0.45% NaCl with KCl 20 mEq/L infusion) 2356. 7 100 2456.7       Shift Total  (mL/kg) 6907.9  (103.5) 100  (1.5) 7007.9  (105)      O  U  T  P  U  T   Urine  (mL/kg/hr) 2075  (2.6) 1500  (1.9) 3575  (2.2)         Urine Output (mL) (Urinary Catheter 18 Patel) 2075 1500 3575       Stool 175  175         Output (ml) (Colostomy 18 Left; Lower  Abdomen) 175  175       Shift Total  (mL/kg) 2250  (33.7) 1500  (22.5) 3750  (56.2)      NET 4657.9 -1400 3257.9      Weight (kg) 66.7 66.7 66.7 66.7 66.7 66.7         Physical Exam:    General: Awake and alert, no apparent distress but uncomfortable   Abdomen: abdomen is soft with left lower quadrant incisional tenderness at the loop Colostomy. No masses, organomegaly or guarding    Labs:  Recent Results (from the past 24 hour(s))   POTASSIUM, UR, RANDOM    Collection Time: 01/30/18  2:20 PM   Result Value Ref Range    Potassium urine, random 10 (L) 12.0 - 62 MMOL/L   MAGNESIUM    Collection Time: 01/31/18  5:16 AM   Result Value Ref Range    Magnesium 1.7 1.6 - 2.6 mg/dL   CBC WITH AUTOMATED DIFF    Collection Time: 01/31/18  5:16 AM   Result Value Ref Range    WBC 8.5 4.6 - 13.2 K/uL    RBC 4.32 (L) 4.70 - 5.50 M/uL    HGB 14.1 13.0 - 16.0 g/dL    HCT 39.7 36.0 - 48.0 %    MCV 91.9 74.0 - 97.0 FL    MCH 32.6 24.0 - 34.0 PG    MCHC 35.5 31.0 - 37.0 g/dL    RDW 12.7 11.6 - 14.5 %    PLATELET 656 119 - 308 K/uL    MPV 10.0 9.2 - 11.8 FL    NEUTROPHILS 70 40 - 73 %    LYMPHOCYTES 12 (L) 21 - 52 %    MONOCYTES 10 3 - 10 %    EOSINOPHILS 8 (H) 0 - 5 %    BASOPHILS 0 0 - 2 %    ABS. NEUTROPHILS 5.9 1.8 - 8.0 K/UL    ABS. LYMPHOCYTES 1.0 0.9 - 3.6 K/UL    ABS. MONOCYTES 0.9 0.05 - 1.2 K/UL    ABS. EOSINOPHILS 0.7 (H) 0.0 - 0.4 K/UL    ABS.  BASOPHILS 0.0 0.0 - 0.1 K/UL    DF AUTOMATED     METABOLIC PANEL, BASIC    Collection Time: 01/31/18  5:16 AM   Result Value Ref Range    Sodium 143 136 - 145 mmol/L    Potassium 3.3 (L) 3.5 - 5.5 mmol/L    Chloride 113 (H) 100 - 108 mmol/L    CO2 23 21 - 32 mmol/L    Anion gap 7 3.0 - 18 mmol/L    Glucose 119 (H) 74 - 99 mg/dL    BUN 4 (L) 7.0 - 18 MG/DL    Creatinine 0.51 (L) 0.6 - 1.3 MG/DL    BUN/Creatinine ratio 8 (L) 12 - 20      GFR est AA >60 >60 ml/min/1.73m2    GFR est non-AA >60 >60 ml/min/1.73m2    Calcium 8.1 (L) 8.5 - 10.1 MG/DL     All Micro Results     None          Current Medications:  Current Facility-Administered Medications   Medication Dose Route Frequency Provider Last Rate Last Dose    potassium chloride (KLOR-CON) packet 20 mEq  20 mEq Oral Q2H Tomy Paulson MD        haloperidol lactate (HALDOL) injection 2 mg  2 mg IntraMUSCular Jori Sykes MD   2 mg at 01/31/18 0259    HYDROmorphone (PF) (DILAUDID) injection 1 mg  1 mg IntraVENous Q3H PRN DIANE Elias   1 mg at 01/31/18 0817    LORazepam (ATIVAN) injection 1 mg  1 mg IntraVENous Q4H PRN Itz Billings MD   1 mg at 01/30/18 1500    0.9% sodium chloride 1,000 mL with mvi, adult no. 4 with vit K 10 mL, thiamine 138 mg, folic acid 1 mg infusion   IntraVENous Q24H Itz Billings  mL/hr at 01/31/18 0258      dextrose 5% - 0.45% NaCl with KCl 20 mEq/L infusion  100 mL/hr IntraVENous CONTINUOUS Pramod Peña  mL/hr at 01/30/18 1714 100 mL/hr at 01/30/18 1714    famotidine (PF) (PEPCID) injection 20 mg  20 mg IntraVENous DAILY Terrance Stovall MD   20 mg at 01/31/18 0817    sodium chloride (NS) flush 5-10 mL  5-10 mL IntraVENous PRN Rashid Hollingsworth DO        enoxaparin (LOVENOX) injection 40 mg  40 mg SubCUTAneous Q24H Rashid Hollingsworth DO   40 mg at 01/30/18 2106    nicotine (NICODERM CQ) 14 mg/24 hr patch 1 Patch  1 Patch TransDERmal DAILY Olya Macario MD   1 Patch at 01/31/18 0819    sodium chloride (NS) flush 5-10 mL  5-10 mL IntraVENous Q8H Olya Macario MD   10 mL at 01/31/18 0600    sodium chloride (NS) flush 5-10 mL  5-10 mL IntraVENous PRN Olya Macario MD   10 mL at 01/27/18 2041    naloxone (NARCAN) injection 0.4 mg  0.4 mg IntraVENous EVERY 2 MINUTES AS NEEDED Olya Macario MD        promethazine (PHENERGAN) 25 mg in 0.9% sodium chloride 50 mL IVPB  25 mg IntraVENous Q6H PRN Olya Macario MD   25 mg at 01/31/18 0818    sodium chloride (NS) flush 5-10 mL  5-10 mL IntraVENous PRN Rashid Hollingsworth DO           Chart and notes reviewed. Data reviewed. I have evaluated and examined the patient. 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       PLAN:/DISCUSION:   · Treat pain and nausea  · Allow the patient to eat what he can tolerate  · Stoma latanya Rangel MD

## 2018-01-31 NOTE — PROGRESS NOTES
Intern Progress Note  [facility]       Patient: Maddy Robles MRN: [id number]  CSN: [id number]    YOB: 1955  Age: 70 y.o. Sex: male    DOA: 1/24/2018 LOS:  LOS: 7 days                    Subjective:     Acute events: Pt resting in bed, reports nausea and pain overnight. Reports he requested nausea medication but never received it. Reported still nauseated and having incisional pain. Stoma still leaking stool. Review of Systems   Constitutional: Negative for chills and fever. Respiratory: Negative for shortness of breath. Cardiovascular: Negative for chest pain, palpitations and leg swelling. Gastrointestinal: Positive for abdominal pain and nausea. Negative for vomiting. Genitourinary: Positive for hematuria. Negative for dysuria. Neurological: Negative for dizziness and headaches. Objective:      Patient Vitals for the past 24 hrs:   Temp Pulse Resp BP SpO2   01/31/18 0515 97.7 °F (36.5 °C) - 18 159/67 95 %   01/30/18 2335 97.7 °F (36.5 °C) 71 18 123/69 92 %   01/30/18 1942 97.9 °F (36.6 °C) 86 20 131/69 95 %   01/30/18 1555 98.8 °F (37.1 °C) 98 21 129/86 96 %   01/30/18 1033 98.4 °F (36.9 °C) 85 18 184/80 97 %   01/30/18 0807 98.4 °F (36.9 °C) 71 18 151/75 94 %         Intake/Output Summary (Last 24 hours) at 01/31/18 0649  Last data filed at 01/31/18 8212   Gross per 24 hour   Intake          7007.92 ml   Output             3750 ml   Net          3257.92 ml       Physical Exam   Constitutional: He is oriented to person, place, and time. He appears cachectic. He appears distressed. HENT:   Head: Normocephalic and atraumatic. Eyes: EOM are normal.   Neck: Normal range of motion. Cardiovascular: Normal rate, regular rhythm, normal heart sounds and intact distal pulses. Pulmonary/Chest: Effort normal and breath sounds normal.   Abdominal: Soft. Bowel sounds are normal. He exhibits no distension. There is tenderness.    Stoma leaking brown liquid stool   Genitourinary:   Genitourinary Comments: Patel in place, red urine in bag   Musculoskeletal: He exhibits no edema or tenderness. Neurological: He is alert and oriented to person, place, and time. Skin: Skin is warm and dry. Psychiatric: He has a normal mood and affect. His behavior is normal.       Lab/Data Reviewed:  Recent Results (from the past 24 hour(s))   POTASSIUM, UR, RANDOM    Collection Time: 01/30/18  2:20 PM   Result Value Ref Range    Potassium urine, random 10 (L) 12.0 - 62 MMOL/L        Scheduled Medications Reviewed:  Current Facility-Administered Medications   Medication Dose Route Frequency    haloperidol lactate (HALDOL) injection 2 mg  2 mg IntraMUSCular Q8H    0.9% sodium chloride 1,000 mL with mvi, adult no. 4 with vit K 10 mL, thiamine 701 mg, folic acid 1 mg infusion   IntraVENous Q24H    dextrose 5% - 0.45% NaCl with KCl 20 mEq/L infusion  100 mL/hr IntraVENous CONTINUOUS    famotidine (PF) (PEPCID) injection 20 mg  20 mg IntraVENous DAILY    enoxaparin (LOVENOX) injection 40 mg  40 mg SubCUTAneous Q24H    nicotine (NICODERM CQ) 14 mg/24 hr patch 1 Patch  1 Patch TransDERmal DAILY    sodium chloride (NS) flush 5-10 mL  5-10 mL IntraVENous Q8H         Imaging, microbiology, and EKG/Telemetry:      Assessment/Plan     Sander Coyle is a 58 y/o male with PMHx of bladder CA with metastases to the lungs and spine, bowel obstruction, and HLD who is now admitted with bowel obstruction, and for intractable pain, nausea and vomiting. He is post-op day 6 from loop colostomy.       S/P Loop Colostomy for Bowel Obstruction 2/2 metastasis vs post-radiatio changes vs stricture-  Patient has a h/o metatastic bladder cancer. Dr. Willow Pineda performed loop colostomy without problems on 1/25/18.  Pt producing liquid stool now, stool is leaking around colostomy site;   - General surgery following, POD #6 s/p loop colostomy, cleared for discharged per surgery  - Surgery to manage leaking colostomy site   - Continue D5 1/2 NS w/ 20 KCl @ 100 cc/hr, reassess daily  - Dilaudid 1mg IV q3h PRN  - IV phenergan 25 mg q6h PRN for nausea/vomiting  - Diet per surgery- advanced to GI lite with nutritional supplements      Acute Delirium/Alcohol Withdrawal   - CIWA Protocol discontinued due to oversedation   - Continue Ativan 1 mg q4hrs PRN   - Continue Haldol 2mg IM q8h    - Speech therapy consulted- pt passed swallow study      Malignant Bladder Cancer/Urinary Incontinence- Followed by Dr. Adria Cardenas (Urology), Dr. Nohemi Pineda (Heme-Onc), & Dr. Tayla Mcpherson (Radiation Onc) as outpatient.    - Heme-Onc consulted- recommended hospice   - Urology consulted- recommended OP FU   - Palliative Care consulted   - Hold home oxybutynin XL 10 mg daily while NPO  - Pt to be discharged to [facility], then admitted to hospice from there      Hypokalemia (improved)  - K 3.6>2.8>3.1>3.3>3.4>3.0>3.1>3.2>3.2>3.3  - 20 mEq KCL PO q2h x3 ordered  - Monitor BMP, replete as necessary  - IVF as above      Chemotherapy-induced Neuropathy  - Hold home gabapentin 600 mg TID while NPO      Nicotine Dependence  - Patient has a 43 pack year history and currently smokes about 1 ppd  - Nicotine patch, 14 mg/24 hrs           Diet: GI lite with Ensure Enlive at all meals   DVT Prophylaxis: Lovenox  Code Status: DNR  Point of Contact: Martinez Hernandez, [phone], Sister   Domitila Howell, [phone], Brother-in-Law      Disposition and anticipated LOS: >2 midnights    Diana Estrada MD, PGY-1  [facility] Medicine  01/31/18 6:49 AM

## 2018-01-31 NOTE — MED STUDENT NOTES
*ATTENTION:  This note has been created by a medical student for educational purposes only. Please do not refer to the content of this note for clinical decision-making, billing, or other purposes. Please see attending physicians note to obtain clinical information on this patient. *       Progress Note  [facility]MS Service    Patient: Mac Mota MRN: [id number]   SSN: [id number]  YOB: 1955   Age: 70 y.o. Sex: male      Admit Date: 1/24/2018    LOS: 7 days   Chief Complaint   Patient presents with    Abdominal Pain       Subjective:     POD 6. NAEO. Pt c/o back pain this AM, was given PRN meds with some relief. Ate ~25% of breakfast, all of Ensure this AM. Surgery ok to advance diet as tolerated. Urology recs leave winslow in place, to change at OP appt in 4 weeks. Per care management, plan for DC to [facility] 2/1/18, needs to be out of restraints for 72 hours (last 1/29/18). Review of Systems:  Limited ROS due to sedation. Patient Endorses back pain, denied N/V. Objective:     Vitals:  Visit Vitals    /67 (BP 1 Location: Right arm, BP Patient Position: At rest)    Pulse 71    Temp 97.7 °F (36.5 °C)    Resp 18    Ht 5' 9\" (1.753 m)    Wt 66.7 kg (147 lb 1.5 oz)    SpO2 95%    BMI 21.72 kg/m2       Physical Exam:   General:  Lethargic, cooperative, no distress, chronically ill appearing. Eyes:  Conjunctivae pale. PERRL, EOMs intact. Ears:  Normal external ears. Nose: Nares normal. No drainage. Mouth/Throat: Dry mucosa, poor dentition. Neck: Supple, symmetrical, trachea midline, no adenopathy.           Lungs:   Clear to auscultation bilat, decreased effort, on RA. Heart:  Regular rate and rhythm, no murmur, rub or gallop. Abdomen:   Nondistended, soft, moderate tenderness, some guarding, no rebound. Bowel sounds normal. Ostomy LLQ with soft brown stool, stoma pink well healing. Winslow with clear straw urine, bag with gross hematuria.     Extremities: Extremities normal, atraumatic, no cyanosis or edema. Pulses: 2+ and symmetric all extremities. Skin: Skin warm and dry. No rashes or lesions   Lymph nodes: Cervical, supraclavicular, nodes normal.   Neurologic: CNII-XII grossly intact. Moves all extremities, sensation intact throughout. Intake and Output:  Current Shift:    Last three shifts: 01/29 1901 - 01/31 0700  In: 7007.9 [P.O.:270; I.V.:6737.9]  Out: 5375 [Urine:4975]    Lab/Data Review:  Recent Results (from the past 12 hour(s))   MAGNESIUM    Collection Time: 01/31/18  5:16 AM   Result Value Ref Range    Magnesium 1.7 1.6 - 2.6 mg/dL   CBC WITH AUTOMATED DIFF    Collection Time: 01/31/18  5:16 AM   Result Value Ref Range    WBC 8.5 4.6 - 13.2 K/uL    RBC 4.32 (L) 4.70 - 5.50 M/uL    HGB 14.1 13.0 - 16.0 g/dL    HCT 39.7 36.0 - 48.0 %    MCV 91.9 74.0 - 97.0 FL    MCH 32.6 24.0 - 34.0 PG    MCHC 35.5 31.0 - 37.0 g/dL    RDW 12.7 11.6 - 14.5 %    PLATELET 205 236 - 498 K/uL    MPV 10.0 9.2 - 11.8 FL    NEUTROPHILS 70 40 - 73 %    LYMPHOCYTES 12 (L) 21 - 52 %    MONOCYTES 10 3 - 10 %    EOSINOPHILS 8 (H) 0 - 5 %    BASOPHILS 0 0 - 2 %    ABS. NEUTROPHILS 5.9 1.8 - 8.0 K/UL    ABS. LYMPHOCYTES 1.0 0.9 - 3.6 K/UL    ABS. MONOCYTES 0.9 0.05 - 1.2 K/UL    ABS. EOSINOPHILS 0.7 (H) 0.0 - 0.4 K/UL    ABS.  BASOPHILS 0.0 0.0 - 0.1 K/UL    DF AUTOMATED     METABOLIC PANEL, BASIC    Collection Time: 01/31/18  5:16 AM   Result Value Ref Range    Sodium 143 136 - 145 mmol/L    Potassium 3.3 (L) 3.5 - 5.5 mmol/L    Chloride 113 (H) 100 - 108 mmol/L    CO2 23 21 - 32 mmol/L    Anion gap 7 3.0 - 18 mmol/L    Glucose 119 (H) 74 - 99 mg/dL    BUN 4 (L) 7.0 - 18 MG/DL    Creatinine 0.51 (L) 0.6 - 1.3 MG/DL    BUN/Creatinine ratio 8 (L) 12 - 20      GFR est AA >60 >60 ml/min/1.73m2    GFR est non-AA >60 >60 ml/min/1.73m2    Calcium 8.1 (L) 8.5 - 10.1 MG/DL       Key Findings or tests:   DATE TEST RESULT OR IMPRESSION                              Telemetry Cardiac Oxygen NONE     Assessment and Plan:     Fredrick Jorge is a 57 y.o. Male with PMH of metastatic bladder CA s/p chemo and XRT, DM, EtOH abuse, admitted on 1/24/18 for LBO now s/p diverting loop colostomy. He is now POD 6, improving bowel function, tolerating advanced diet, plans for DC to SNF for rehab 2/1/18.     LBO s/p loop colostomy POD 6  - h/o met bladder CA s/p chemo and XRT  - loop colostomy performed 1/25/18  - ostomy well healing, continue wound care   - tolerating GI lite diet  - ostomy  > 175     Progressive Met Bladder CA s/p chemo and XRT  - Oncology following, recs hospice  - chronic winslow, to change at next OP appt    EtOH withdrawal/Acute delirium  - on Ativan PRN, scheduled Haldol  - improving, no longer requires restraints    Hypokalemia  - 3.2 > 3.3   - Urine K 10 suggesting unlikely renal loss  - suspect 2/2 to GI losses vs poor PO intake  - improving, on replacement  - continue to monitor      Diet  GI Lite   DVT Prophylax  Lovenox   GI Prophylaxis  Pepcid   Code status  DNR   Disposition  SNF on 2/1/18        Corie Chávez , MS4   January 31, 2018

## 2018-01-31 NOTE — PROGRESS NOTES
Hematology/Medical Oncology Progress Note      NAME: Paola Erickson   :  1955  MRM:  [id number]    Date/Time: 2018  8:32 AM         Subjective:     Mr Tonio Polanco is a 57 y.o. Man with a history of metastatic bladder cancer. He was admitted with abdominal pain and found to have  Colonic obstruction. He underwent a laparotomy and his ostomy output is now good. The patient is fully alert at this time. There is minimal confusion. The restraints have been removed. There are no complaints of pain. Past Medical History reviewed and unchanged from Admission History and Physical    Review of Systems   Constitutional: Positive for chills and fatigue. Negative for activity change, appetite change, diaphoresis, fever and unexpected weight change. HENT: Negative for congestion, facial swelling, mouth sores, nosebleeds, postnasal drip, trouble swallowing and voice change. Eyes: Negative for photophobia, pain, discharge and itching. Respiratory: Negative for apnea, cough, choking, chest tightness, wheezing and stridor. Cardiovascular: Negative for chest pain, palpitations and leg swelling. Gastrointestinal: Negative for abdominal pain, blood in stool, constipation, diarrhea, nausea and rectal pain. Genitourinary: Negative for difficulty urinating, dysuria, flank pain, hematuria and urgency. Musculoskeletal: Negative for arthralgias, back pain, gait problem, joint swelling, neck pain and neck stiffness. Skin: Negative for color change, pallor, rash and wound. Neurological: Negative for dizziness, facial asymmetry, speech difficulty, weakness, light-headedness and headaches. Hematological: Negative for adenopathy. Does not bruise/bleed easily. Psychiatric/Behavioral: Negative for agitation, confusion, hallucinations and sleep disturbance. Objective:       Vitals:      Last 24hrs VS reviewed since prior progress note.  Most recent are:    Visit Vitals    /67 (BP 1 Location: Right arm, BP Patient Position: At rest)    Pulse 71    Temp 97.7 °F (36.5 °C)    Resp 18    Ht 5' 9\" (1.753 m)    Wt 66.7 kg (147 lb 1.5 oz)    SpO2 95%    BMI 21.72 kg/m2     SpO2 Readings from Last 6 Encounters:   01/31/18 95%   01/08/18 97%   01/23/17 100%   06/11/16 99%   03/03/16 99%   10/01/15 100%    O2 Flow Rate (L/min): 2 l/min       Intake/Output Summary (Last 24 hours) at 01/31/18 0832  Last data filed at 01/31/18 0527   Gross per 24 hour   Intake          6887.92 ml   Output             3750 ml   Net          3137.92 ml          Exam:      Physical Exam   Nursing note and vitals reviewed. Constitutional: He is oriented to person, place, and time. He appears well-developed and well-nourished. No distress. HENT:   Head: Normocephalic and atraumatic. Mouth/Throat: No oropharyngeal exudate. Eyes: Conjunctivae and EOM are normal. Pupils are equal, round, and reactive to light. Right eye exhibits no discharge. Left eye exhibits no discharge. No scleral icterus. Neck: Normal range of motion. Neck supple. No tracheal deviation present. No thyromegaly present. Cardiovascular: Normal rate and regular rhythm. Exam reveals no gallop and no friction rub. No murmur heard. Pulmonary/Chest: Effort normal and breath sounds normal. No apnea. No respiratory distress. He has no wheezes. He has no rales. Chest wall is not dull to percussion. He exhibits no mass, no tenderness, no bony tenderness, no laceration, no crepitus, no edema, no deformity, no swelling and no retraction. Right breast exhibits no inverted nipple, no mass, no nipple discharge, no skin change and no tenderness. Left breast exhibits no inverted nipple, no mass, no nipple discharge, no skin change and no tenderness. Breasts are symmetrical.   Abdominal: Soft. Bowel sounds are normal. He exhibits no distension. There is no tenderness. There is no rebound and no guarding. Musculoskeletal: Normal range of motion.  He exhibits no edema or tenderness. Lymphadenopathy:     He has no cervical adenopathy. Neurological: He is alert and oriented to person, place, and time. Coordination normal.   Skin: Skin is warm and dry. No rash noted. He is not diaphoretic. No erythema. No pallor. Psychiatric:   Confused       Telemetry reviewed:   normal sinus rhythm      Lab Data Reviewed: (see below)      Medications:  Current Facility-Administered Medications   Medication Dose Route Frequency    potassium chloride (KLOR-CON) packet 20 mEq  20 mEq Oral Q2H    haloperidol lactate (HALDOL) injection 2 mg  2 mg IntraMUSCular Q8H    HYDROmorphone (PF) (DILAUDID) injection 1 mg  1 mg IntraVENous Q3H PRN    LORazepam (ATIVAN) injection 1 mg  1 mg IntraVENous Q4H PRN    0.9% sodium chloride 1,000 mL with mvi, adult no. 4 with vit K 10 mL, thiamine 804 mg, folic acid 1 mg infusion   IntraVENous Q24H    dextrose 5% - 0.45% NaCl with KCl 20 mEq/L infusion  100 mL/hr IntraVENous CONTINUOUS    famotidine (PF) (PEPCID) injection 20 mg  20 mg IntraVENous DAILY    sodium chloride (NS) flush 5-10 mL  5-10 mL IntraVENous PRN    enoxaparin (LOVENOX) injection 40 mg  40 mg SubCUTAneous Q24H    nicotine (NICODERM CQ) 14 mg/24 hr patch 1 Patch  1 Patch TransDERmal DAILY    sodium chloride (NS) flush 5-10 mL  5-10 mL IntraVENous Q8H    sodium chloride (NS) flush 5-10 mL  5-10 mL IntraVENous PRN    naloxone (NARCAN) injection 0.4 mg  0.4 mg IntraVENous EVERY 2 MINUTES AS NEEDED    promethazine (PHENERGAN) 25 mg in 0.9% sodium chloride 50 mL IVPB  25 mg IntraVENous Q6H PRN    sodium chloride (NS) flush 5-10 mL  5-10 mL IntraVENous PRN       ______________________________________________________________________      Lab Review:     Recent Labs      01/31/18   0516  01/30/18   0504  01/29/18   0529   WBC  8.5  6.1  5.7   HGB  14.1  13.9  13.7   HCT  39.7  38.6  39.7   PLT  226  220  207     Recent Labs      01/31/18   0516  01/30/18   0504  01/29/18   1838  01/29/18 0529   NA  143  143   --   144   K  3.3*  3.2*  3.2*  3.1*   CL  113*  113*   --   113*   CO2  23  24   --   24   GLU  119*  95   --   85   BUN  4*  5*   --   4*   CREA  0.51*  0.48*   --   0.42*   CA  8.1*  7.3*   --   7.3*   MG  1.7  1.8   --   2.2     No components found for: GLPOC  No results for input(s): PH, PCO2, PO2, HCO3, FIO2 in the last 72 hours. No results for input(s): INR in the last 72 hours. No lab exists for component: INREXT, INREXT, INREXT    Other pertinent lab:          Assessment:     Principal Problem: Bowel obstruction (1/24/2018)    Active Problems:    Bladder cancer ([facility] [address] (8/7/2013)      Nicotine dependence (3/27/2009)      Metastatic disease ([facility] [address] (1/24/2018)      Intractable abdominal pain (1/24/2018)      Chemotherapy-induced neuropathy ([facility] [address] (1/24/2018)      Alcohol abuse (1/24/2018)           Plan:     Risk of deterioration: medium             1. Bowel obstruction: status post surgical intervention. Followed by surgery. 2. Metastatic bladder cancer: I have previously explained to the patient that the imaging studies support progression of his metastatic bladder cancer. His ECOG performance status remains poor. He is not a candidate for any additional systemic therapy. Hospice is recommended. DNR! Total time spent with patient:20 minutes                  Care Plan discussed with: Nursing Staff and Consultant/Specialist    Discussed:  Care Plan    Prophylaxis:  Lovenox    Disposition:   To be dedetmined           ___________________________________________________    Attending Physician: Erica Smith MD

## 2018-01-31 NOTE — PROGRESS NOTES
Problem: Self Care Deficits Care Plan (Adult)  Goal: *Acute Goals and Plan of Care (Insert Text)  Occupational Therapy Goals  Initiated 1/26/2018 within 7 day(s). 1.  Patient will perform grooming with supervision/set-up   2. Patient will perform upper body dressing with supervision/set-up. 3.  Patient will perform lower body dressing with supervision/set-up with adaptive strategies   4. Patient will perform toilet transfers with supervision/set-up. 5.  Patient will perform all aspects of toileting with supervision/set-up. 6.  Patient will participate in upper extremity therapeutic exercise/activities with supervision/set-up in preparation for self care tasks   Outcome: Progressing Towards Goal  Occupational Therapy TREATMENT    Patient: Sowmya Frey (28 y.o. male)  Date: 1/31/2018  Diagnosis: SBO (small bowel obstruction)  Abdominal pain  Bladder cancer ([facility]  Metastatic disease ([facility] [facility]  Intractable abdominal pain  DX Bowel obstruction  Procedure(s) (LRB):  LOOP COLOSTOMY FROM DESCENDING COLON (N/A) 6 Days Post-Op  Precautions: Aspiration  Chart, occupational therapy assessment, plan of care, and goals were reviewed. ASSESSMENT:  Pt is alert and cooperative, agreeable to participate in therapy. Pt required VCs for sequencing during tasks this session, however was able to demonstrate improved activity tolerance. Pt maneuvered to EOB w/CGA, participated in functional txfr w/FWW, simulating txfr to the toilet (pt w/ostomy bag and catheter), required CGA for balance and additional time to complete. Pt returned to EOB, participated in UB/LB dressing training w/adaptive strategies, requiring CGA. Pt maneuvered to sup at the end of the session, call bell within reach and sitter present. Pt's nurse notified.   Progression toward goals:  [x]          Improving appropriately and progressing toward goals  []          Improving slowly and progressing toward goals  []          Not making progress toward goals and plan of care will be adjusted     PLAN:  Patient continues to benefit from skilled intervention to address the above impairments. Continue treatment per established plan of care. Discharge Recommendations:  Rehab  Further Equipment Recommendations for Discharge:  N/A      G-CODES:     Self Care  Current  CJ= 20-39%   Goal  CI= 1-19%. The severity rating is based on the Level of Assistance required for Functional Mobility and ADLs. SUBJECTIVE:   Patient stated I guess I am weak.    Pt also reports he is \"seeing\" bugs and rats in room occassionally    OBJECTIVE DATA SUMMARY:   Cognitive/Behavioral Status:  Neurologic State: Alert  Orientation Level: Oriented to person, Oriented to place  Cognition: Follows commands  Safety/Judgement: Fall prevention    Functional Mobility and Transfers for ADLs:   Bed Mobility:  Rolling: Stand-by asssistance  Supine to Sit: Contact guard assistance  Sit to Supine: Stand-by asssistance      Transfers:  Sit to Stand: Contact guard assistance    Toilet Transfer : Contact guard assistance (simulated w/FWW)      Balance:  Sitting: Impaired  Sitting - Static: Good (unsupported)  Sitting - Dynamic: Fair (occasional)  Standing: Impaired; With support  Standing - Static: Good  Standing - Dynamic : Fair    ADL Intervention:   Grooming  Grooming Assistance: Supervision/set up (seated EOB)  Washing Face: Supervision/set-up  Washing Hands: Supervision/set-up  Brushing/Combing Hair: Supervision/set-up    Upper Body Dressing Assistance  Hospital Gown: Contact guard assistance (seated EOB)    Lower Body Dressing Assistance  Socks: Contact guard assistance; Compensatory technique training (seated EOB)  Leg Crossed Method Used: Yes  Pain:  Pt didn't rate pain or discomfort prior to treatment.    Pt didn't rate pain or discomfort post treatment. Activity Tolerance:    Fair    Please refer to the flowsheet for vital signs taken during this treatment.   After treatment:   []  Patient left in no apparent distress sitting up in chair  [x]  Patient left in no apparent distress in bed  [x]  Call bell left within reach  [x]  Nursing notified  [x]  Caregiver present  []  Bed alarm activated        MODESTA Butler  Time Calculation: 38 mins

## 2018-01-31 NOTE — PROGRESS NOTES
Problem: Mobility Impaired (Adult and Pediatric)  Goal: *Acute Goals and Plan of Care (Insert Text)  Physical Therapy Goals  Initiated 1/25/2018 and to be accomplished within 7 day(s)  1. Patient will move from supine to sit and sit to supine, scoot up and down and roll side to side in bed with supervision/set-up. 2.  Patient will transfer from bed to chair and chair to bed with supervision/set-up using the least restrictive device. 3.  Patient will perform sit to stand with supervision/set-up. 4.  Patient will ambulate with supervision/set-up for >50 feet with the least restrictive device. 5.  Patient will ascend/descend 2 stairs with 1 handrail(s) with supervision/set-up. Outcome: Progressing Towards Goal  physical Therapy TREATMENT    Patient: Kain Gardner (27 y.o. male)  Date: 1/31/2018  Diagnosis: SBO (small bowel obstruction)  Abdominal pain  Bladder cancer ([facility])  Metastatic disease ([facility])  Intractable abdominal pain  DX Bowel obstruction  Procedure(s) (LRB):  LOOP COLOSTOMY FROM DESCENDING COLON (N/A) 6 Days Post-Op  Precautions: Aspiration   Chart, physical therapy assessment, plan of care and goals were reviewed. ASSESSMENT:  Pt found supine in bed willing to work with PT. Pt seen with OT to maximize safety of pt and staff. Pt is able to make great progress this tx as evidence by decreased assistance needed and ability to ambulate an increased distance. Pt requires increased time for all mobility as he present with some mild confusion and requires some redirection as well as very short step length. Pt returned to EOB and reports seeing rats in the cracks of the floor. Pt is easily redirected when assured there is nothing there. Pt returned to supine and performed ankle pumps. Pt need additional time and redirection for ankle pumps as he also wants to wash his hands with bath wipes at this time. Pt left with needs in reach. Education: therex, transfers, gait.   Progression toward goals:  []      Improving appropriately and progressing toward goals  [x]      Improving slowly and progressing toward goals  []      Not making progress toward goals and plan of care will be adjusted     PLAN:  Patient continues to benefit from skilled intervention to address the above impairments. Continue treatment per established plan of care. Discharge Recommendations:  SNF  Further Equipment Recommendations for Discharge:  rolling walker     SUBJECTIVE:   Patient stated I move my ankles all day long.     OBJECTIVE DATA SUMMARY:   Critical Behavior:  Neurologic State: Alert  Orientation Level: Oriented to person, Oriented to place  Cognition: Follows commands  Safety/Judgement: Fall prevention  Functional Mobility Training:  Bed Mobility:  Rolling: Stand-by asssistance  Supine to Sit: Contact guard assistance  Sit to Supine: Stand-by asssistance  Transfers:  Sit to Stand: Contact guard assistance  Stand to Sit: Contact guard assistance  Balance:  Sitting: Impaired  Sitting - Static: Good (unsupported)  Sitting - Dynamic: Fair (occasional)  Standing: Impaired; With support  Standing - Static: Good  Standing - Dynamic : Fair  Ambulation/Gait Training:  Distance (ft): 12 Feet (ft)  Ambulation - Level of Assistance: Contact guard assistance  Gait Abnormalities: Decreased step clearance  Base of Support: Widened  Speed/Sudha: Slow  Interventions: Verbal cues    Therapeutic Exercises: Ankle pumps x 10 bilaterally. Pain:  Pre tx pain: not rated  Post tx pain: not rated  Pt reports some abd pain, but does not rate. Activity Tolerance:   Fair  Please refer to the flowsheet for vital signs taken during this treatment.   After treatment:   [] Patient left in no apparent distress sitting up in chair  [x] Patient left in no apparent distress in bed  [x] Call bell left within reach  [] Nursing notified  [] Caregiver present  [] Bed alarm activated  [] SCDs applied      Russ Stokes PTA   Time Calculation: 38 mins    Mobility [id number] Current  CI= 1-19%. The severity rating is based on the Level of Assistance required for Functional Mobility and ADLs. Mobility   Goal  CI= 1-19%. The severity rating is based on the Level of Assistance required for Functional Mobility and ADLs.

## 2018-01-31 NOTE — ROUTINE PROCESS
Bedside and Verbal shift change report given to Kisha Moore RN  (oncoming nurse) by Amara Dobbins RN  (offgoing nurse).  Report included the following information SBAR, Kardex, Procedure Summary, Intake/Output, MAR, Recent Results, Med Rec Status and Cardiac Rhythm SR.

## 2018-01-31 NOTE — WOUND CARE
Physical Exam   Room 2306: pt's appliance is leaking. Removed appliance, peristomal skin is severely denuded. tx with stoma powder & skin prep no sting spray crusting technique. Pt's stoma franny is intact, stoma is almost flush with skin, causing peristomal irritation. Fitted with eakins ring, 2 3/4\" flange cut to fit, drainable pouch, ostomy appliance belt. Extra supplies at bedside. Will turn over care to nursing staff at this time.   Marielos IBARRAN, RN, [facility], [address]

## 2018-02-01 VITALS
BODY MASS INDEX: 21.79 KG/M2 | DIASTOLIC BLOOD PRESSURE: 66 MMHG | WEIGHT: 147.09 LBS | RESPIRATION RATE: 18 BRPM | TEMPERATURE: 98.2 F | HEART RATE: 60 BPM | SYSTOLIC BLOOD PRESSURE: 110 MMHG | HEIGHT: 69 IN | OXYGEN SATURATION: 95 %

## 2018-02-01 LAB
ANION GAP SERPL CALC-SCNC: 7 MMOL/L (ref 3–18)
BASOPHILS # BLD: 0 K/UL (ref 0–0.1)
BASOPHILS NFR BLD: 1 % (ref 0–2)
BUN SERPL-MCNC: 5 MG/DL (ref 7–18)
BUN/CREAT SERPL: 9 (ref 12–20)
CALCIUM SERPL-MCNC: 8.4 MG/DL (ref 8.5–10.1)
CHLORIDE SERPL-SCNC: 110 MMOL/L (ref 100–108)
CO2 SERPL-SCNC: 25 MMOL/L (ref 21–32)
CREAT SERPL-MCNC: 0.57 MG/DL (ref 0.6–1.3)
DIFFERENTIAL METHOD BLD: ABNORMAL
EOSINOPHIL # BLD: 0.7 K/UL (ref 0–0.4)
EOSINOPHIL NFR BLD: 9 % (ref 0–5)
ERYTHROCYTE [DISTWIDTH] IN BLOOD BY AUTOMATED COUNT: 13.2 % (ref 11.6–14.5)
GLUCOSE SERPL-MCNC: 136 MG/DL (ref 74–99)
HCT VFR BLD AUTO: 39 % (ref 36–48)
HGB BLD-MCNC: 13.6 G/DL (ref 13–16)
LYMPHOCYTES # BLD: 1.3 K/UL (ref 0.9–3.6)
LYMPHOCYTES NFR BLD: 16 % (ref 21–52)
MAGNESIUM SERPL-MCNC: 1.7 MG/DL (ref 1.6–2.6)
MCH RBC QN AUTO: 32.8 PG (ref 24–34)
MCHC RBC AUTO-ENTMCNC: 34.9 G/DL (ref 31–37)
MCV RBC AUTO: 94 FL (ref 74–97)
MONOCYTES # BLD: 0.8 K/UL (ref 0.05–1.2)
MONOCYTES NFR BLD: 10 % (ref 3–10)
NEUTS SEG # BLD: 5.1 K/UL (ref 1.8–8)
NEUTS SEG NFR BLD: 64 % (ref 40–73)
PLATELET # BLD AUTO: 218 K/UL (ref 135–420)
PMV BLD AUTO: 9.9 FL (ref 9.2–11.8)
POTASSIUM SERPL-SCNC: 4.1 MMOL/L (ref 3.5–5.5)
RBC # BLD AUTO: 4.15 M/UL (ref 4.7–5.5)
SODIUM SERPL-SCNC: 142 MMOL/L (ref 136–145)
WBC # BLD AUTO: 7.9 K/UL (ref 4.6–13.2)

## 2018-02-01 PROCEDURE — 85025 COMPLETE CBC W/AUTO DIFF WBC: CPT

## 2018-02-01 PROCEDURE — 97168 OT RE-EVAL EST PLAN CARE: CPT

## 2018-02-01 PROCEDURE — 36415 COLL VENOUS BLD VENIPUNCTURE: CPT

## 2018-02-01 PROCEDURE — 80048 BASIC METABOLIC PNL TOTAL CA: CPT

## 2018-02-01 PROCEDURE — 74011000250 HC RX REV CODE- 250: Performed by: EMERGENCY MEDICINE

## 2018-02-01 PROCEDURE — 83735 ASSAY OF MAGNESIUM: CPT

## 2018-02-01 PROCEDURE — 97164 PT RE-EVAL EST PLAN CARE: CPT

## 2018-02-01 PROCEDURE — 74011250636 HC RX REV CODE- 250/636: Performed by: FAMILY MEDICINE

## 2018-02-01 PROCEDURE — 74011250637 HC RX REV CODE- 250/637: Performed by: STUDENT IN AN ORGANIZED HEALTH CARE EDUCATION/TRAINING PROGRAM

## 2018-02-01 PROCEDURE — 74011250637 HC RX REV CODE- 250/637: Performed by: FAMILY MEDICINE

## 2018-02-01 RX ORDER — ONDANSETRON 4 MG/1
4 TABLET, ORALLY DISINTEGRATING ORAL
Qty: 30 TAB | Refills: 0 | Status: SHIPPED | OUTPATIENT
Start: 2018-02-01

## 2018-02-01 RX ORDER — IBUPROFEN 200 MG
1 TABLET ORAL DAILY
Qty: 30 PATCH | Refills: 0 | Status: SHIPPED | OUTPATIENT
Start: 2018-02-02 | End: 2018-03-04

## 2018-02-01 RX ORDER — HYDROMORPHONE HYDROCHLORIDE 2 MG/1
1 TABLET ORAL
Qty: 45 TAB | Refills: 0 | Status: SHIPPED | OUTPATIENT
Start: 2018-02-01

## 2018-02-01 RX ORDER — FENTANYL 25 UG/1
1 PATCH TRANSDERMAL
Qty: 5 PATCH | Refills: 0 | Status: SHIPPED | OUTPATIENT
Start: 2018-02-03 | End: 2018-02-18

## 2018-02-01 RX ORDER — GABAPENTIN 300 MG/1
600 CAPSULE ORAL 3 TIMES DAILY
Qty: 45 CAP | Refills: 1 | Status: SHIPPED | OUTPATIENT
Start: 2018-02-01

## 2018-02-01 RX ADMIN — HALOPERIDOL LACTATE 2 MG: 5 INJECTION, SOLUTION INTRAMUSCULAR at 11:03

## 2018-02-01 RX ADMIN — Medication 5 ML: at 05:48

## 2018-02-01 RX ADMIN — HYDROMORPHONE HYDROCHLORIDE 2 MG: 2 TABLET ORAL at 05:48

## 2018-02-01 RX ADMIN — MULTIPLE VITAMINS W/ MINERALS TAB 1 TABLET: TAB at 11:02

## 2018-02-01 RX ADMIN — FAMOTIDINE 20 MG: 10 INJECTION INTRAVENOUS at 11:03

## 2018-02-01 RX ADMIN — HYDROMORPHONE HYDROCHLORIDE 2 MG: 2 TABLET ORAL at 01:34

## 2018-02-01 NOTE — PROGRESS NOTES
Intern Progress Note  [facility]       Patient: Frandy Cobb MRN: [id number]  CSN: [id number]    YOB: 1955  Age: 70 y.o. Sex: male    DOA: 1/24/2018 LOS:  LOS: 8 days                    Subjective:     Mr. Inez Franz reports that he is not \"thinking straight\" as he just received pain medication. He appeared to be slow to understand the plan for the day, but understood that discharge to [facility] is planned for today and is excited for discharge. Reports that his pain is much better controlled than previously. Review of Systems   Constitutional: Negative for chills and fever. Eyes: Negative for blurred vision and double vision. Respiratory: Negative for cough, shortness of breath and wheezing. Cardiovascular: Negative for chest pain and palpitations. Neurological: Negative for dizziness and headaches. Psychiatric/Behavioral:        Reports pain well controlled       Objective:      Patient Vitals for the past 24 hrs:   Temp Pulse Resp BP SpO2   02/01/18 0811 98 °F (36.7 °C) 73 18 123/72 93 %   02/01/18 0555 98.3 °F (36.8 °C) 73 18 122/67 94 %   02/01/18 0040 98 °F (36.7 °C) 76 20 126/69 93 %   01/31/18 2023 98.7 °F (37.1 °C) 83 16 129/65 97 %   01/31/18 1600 97.6 °F (36.4 °C) 96 18 151/82 95 %   01/31/18 1052 98.2 °F (36.8 °C) 81 18 131/66 94 %         Intake/Output Summary (Last 24 hours) at 02/01/18 8802  Last data filed at 02/01/18 0553   Gross per 24 hour   Intake              150 ml   Output             2700 ml   Net            -2550 ml       Physical Exam   Constitutional: He appears well-developed and well-nourished. No distress. Cardiovascular: Normal rate and regular rhythm. Exam reveals no gallop and no friction rub. No murmur heard. Pulmonary/Chest: Effort normal. No respiratory distress. He has no wheezes. He has no rales. Abdominal: Soft. Bowel sounds are normal. He exhibits no distension. There is no tenderness.  There is no rebound and no guarding. Ostomy bag in place LLQ, no leaking on examination   Musculoskeletal: He exhibits no edema. Neurological: He is alert. Skin: Skin is warm and dry. No rash noted. He is not diaphoretic. No erythema. No pallor. Psychiatric:   Alert, but noted frequently he was not \"thinking straight\" as he just received pain medication       Lab/Data Reviewed:  BMP:   Lab Results   Component Value Date/Time     02/01/2018 05:32 AM    K 4.1 02/01/2018 05:32 AM     (H) 02/01/2018 05:32 AM    CO2 25 02/01/2018 05:32 AM    AGAP 7 02/01/2018 05:32 AM     (H) 02/01/2018 05:32 AM    BUN 5 (L) 02/01/2018 05:32 AM    CREA 0.57 (L) 02/01/2018 05:32 AM    GFRAA >60 02/01/2018 05:32 AM    GFRNA >60 02/01/2018 05:32 AM     CBC:   Lab Results   Component Value Date/Time    WBC 7.9 02/01/2018 05:32 AM    HGB 13.6 02/01/2018 05:32 AM    HCT 39.0 02/01/2018 05:32 AM     02/01/2018 05:32 AM        Scheduled Medications Reviewed:  Current Facility-Administered Medications   Medication Dose Route Frequency    haloperidol lactate (HALDOL) injection 2 mg  2 mg IntraMUSCular DAILY    fentaNYL (DURAGESIC) 25 mcg/hr patch 1 Patch  1 Patch TransDERmal Q72H    multivitamin, tx-iron-ca-min (THERA-M w/ IRON) tablet 1 Tab  1 Tab Oral DAILY    dextrose 5% - 0.45% NaCl with KCl 20 mEq/L infusion  100 mL/hr IntraVENous CONTINUOUS    famotidine (PF) (PEPCID) injection 20 mg  20 mg IntraVENous DAILY    enoxaparin (LOVENOX) injection 40 mg  40 mg SubCUTAneous Q24H    nicotine (NICODERM CQ) 14 mg/24 hr patch 1 Patch  1 Patch TransDERmal DAILY    sodium chloride (NS) flush 5-10 mL  5-10 mL IntraVENous Q8H         Imaging, microbiology, and EKG/Telemetry:  None new    Assessment/Plan     Rambo Lakhani is a 57year old male with a PMH significant for bladder cancer with metastases to lungs and spine, bowel obstruction and HLD who bowel obstruction, intractable pain and n/v.      S/p Loop Colostomy for Bowel Obstruction d/t metastasis vs post-radiation changes vs stricture H/o metastatic bladder cancer. S/p loop colostomy (01/25/2018) by Dr. Selena Minor (surgery). - General surgery following, POD#7 s/p loop colostomy, cleared for discharge per surgery  - Leaking at colostomy site improved, seen by ostomy nurse yesterday. Patient reports much improved. - 25 mcg fentanyl patch  - 2mg dilaudid PO Q4H PRN for severe pain  - Zofran 4mg dissolving tablet   - Diet per surgery, passed swallow study per speech now advanced to GI lite with nutritional supplements    Acute delirium/alcohol withdrawal (improved)  - Has been out of restraints at least 72 hours  - Continue haldol 2mg IM every day, will discuss transition to PO vs discontinuing on rounds    Malignant bladder Ca/Urinary incontinence Follows with Dr. Emeli Lynch (urology), Dr. Saul Gupta (heme-onc) and Dr. Lynette Clarke (rad-onc) as an outpatient. With chronic indwelling catheter. - Heme onc consulted, recommending hospice. - Urology consulted, recommending OP FU in four weeks for winslow change   - Palliative care consulted  - PT to be discharged to [facility]    Hypokalemia (improved)  - Daily BMP  - K 4.1 this am  - IVF D5 1/2 NS + 20 meq K @ 100     Chemotherapy-induced neuropathy  - Holding home gabapentin for now d/t adequate pain control with above regimen.      Nicotine Dependence currently smokes 1ppd with a 43 pack year history  - 14 mg nicotine patch    Diet: GI lite with supplements  DVT Prophylaxis: Lovenox  Code Status: DNR  Point of Contact: Luis Lockhart, [phone], (Sister) Lana Salmeron, [phone], (Brother-in-Law)    Disposition: [address], discharge anticipated today      MD Krishna Hines PGY-1  [facility]

## 2018-02-01 NOTE — PROGRESS NOTES
Hematology/Medical Oncology Progress Note      NAME: Maddy Robles   :  1955  MRM:  [id number]    Date/Time: 2018  9:55 AM         Subjective:     Mr Charity Verma is a 57 y.o. man with a history of metastatic bladder cancer. He was admitted with abdominal pain and found to have colonic obstruction. He underwent a laparotomy and his ostomy output is now good. The patient is sitting up in bed this morning fully alert at this time and no longer restrained. He denies abdominal pain or nausea this morning. Past Medical History reviewed and unchanged from Admission History and Physical    Review of Systems   Constitutional: Negative for activity change, appetite change, chills, diaphoresis, fatigue, fever and unexpected weight change. HENT: Negative for congestion, facial swelling, mouth sores, nosebleeds, postnasal drip, trouble swallowing and voice change. Eyes: Negative for photophobia, pain, discharge and itching. Respiratory: Negative for apnea, cough, choking, chest tightness, wheezing and stridor. Cardiovascular: Negative for chest pain, palpitations and leg swelling. Gastrointestinal: Negative for abdominal pain, blood in stool, constipation, diarrhea, nausea and rectal pain. Genitourinary: Negative for difficulty urinating, dysuria, flank pain, hematuria and urgency. Musculoskeletal: Negative for arthralgias, back pain, gait problem, joint swelling, neck pain and neck stiffness. Skin: Negative for color change, pallor, rash and wound. Neurological: Negative for dizziness, facial asymmetry, speech difficulty, weakness, light-headedness and headaches. Hematological: Negative for adenopathy. Does not bruise/bleed easily. Psychiatric/Behavioral: Negative for agitation, confusion, hallucinations and sleep disturbance. Objective:       Vitals:      Last 24hrs VS reviewed since prior progress note.  Most recent are:    Visit Vitals    /72    Pulse 73    Temp 98 °F (36.7 °C)    Resp 18    Ht 5' 9\" (1.753 m)    Wt 66.7 kg (147 lb 1.5 oz)    SpO2 93%    BMI 21.72 kg/m2     SpO2 Readings from Last 6 Encounters:   02/01/18 93%   01/08/18 97%   01/23/17 100%   06/11/16 99%   03/03/16 99%   10/01/15 100%    O2 Flow Rate (L/min): 2 l/min       Intake/Output Summary (Last 24 hours) at 02/01/18 1101  Last data filed at 02/01/18 0553   Gross per 24 hour   Intake              150 ml   Output             2000 ml   Net            -1850 ml          Exam:      Physical Exam   Nursing note and vitals reviewed. Constitutional: He is oriented to person, place, and time. He appears well-developed and well-nourished. No distress. HENT:   Head: Normocephalic and atraumatic. Mouth/Throat: No oropharyngeal exudate. Eyes: Conjunctivae and EOM are normal. Pupils are equal, round, and reactive to light. Right eye exhibits no discharge. Left eye exhibits no discharge. No scleral icterus. Neck: Normal range of motion. Neck supple. No tracheal deviation present. No thyromegaly present. Cardiovascular: Normal rate and regular rhythm. Exam reveals no gallop and no friction rub. No murmur heard. Pulmonary/Chest: Effort normal and breath sounds normal. No apnea. No respiratory distress. He has no wheezes. He has no rales. Chest wall is not dull to percussion. He exhibits no mass, no tenderness, no bony tenderness, no laceration, no crepitus, no edema, no deformity, no swelling and no retraction. Right breast exhibits no inverted nipple, no mass, no nipple discharge, no skin change and no tenderness. Left breast exhibits no inverted nipple, no mass, no nipple discharge, no skin change and no tenderness. Breasts are symmetrical.   Abdominal: Soft. Bowel sounds are normal. He exhibits no distension. There is no tenderness. There is no rebound and no guarding. Musculoskeletal: Normal range of motion. He exhibits no edema or tenderness.    Lymphadenopathy:     He has no cervical adenopathy. Neurological: He is alert and oriented to person, place, and time. Coordination normal.   Skin: Skin is warm and dry. No rash noted. He is not diaphoretic. No erythema. No pallor. Psychiatric:   Confused       Telemetry reviewed:   normal sinus rhythm      Lab Data Reviewed: (see below)      Medications:  Current Facility-Administered Medications   Medication Dose Route Frequency    haloperidol lactate (HALDOL) injection 2 mg  2 mg IntraMUSCular DAILY    ondansetron (ZOFRAN ODT) tablet 4 mg  4 mg Oral Q6H PRN    HYDROmorphone (DILAUDID) tablet 2 mg  2 mg Oral Q4H PRN    fentaNYL (DURAGESIC) 25 mcg/hr patch 1 Patch  1 Patch TransDERmal Q72H    multivitamin, tx-iron-ca-min (THERA-M w/ IRON) tablet 1 Tab  1 Tab Oral DAILY    famotidine (PF) (PEPCID) injection 20 mg  20 mg IntraVENous DAILY    sodium chloride (NS) flush 5-10 mL  5-10 mL IntraVENous PRN    enoxaparin (LOVENOX) injection 40 mg  40 mg SubCUTAneous Q24H    nicotine (NICODERM CQ) 14 mg/24 hr patch 1 Patch  1 Patch TransDERmal DAILY    sodium chloride (NS) flush 5-10 mL  5-10 mL IntraVENous Q8H    sodium chloride (NS) flush 5-10 mL  5-10 mL IntraVENous PRN    naloxone (NARCAN) injection 0.4 mg  0.4 mg IntraVENous EVERY 2 MINUTES AS NEEDED    sodium chloride (NS) flush 5-10 mL  5-10 mL IntraVENous PRN       ______________________________________________________________________      Lab Review:     Recent Labs      02/01/18   0532  01/31/18   0516  01/30/18   0504   WBC  7.9  8.5  6.1   HGB  13.6  14.1  13.9   HCT  39.0  39.7  38.6   PLT  218  226  220     Recent Labs      02/01/18   0532  01/31/18   0516  01/30/18   0504   NA  142  143  143   K  4.1  3.3*  3.2*   CL  110*  113*  113*   CO2  25  23  24   GLU  136*  119*  95   BUN  5*  4*  5*   CREA  0.57*  0.51*  0.48*   CA  8.4*  8.1*  7.3*   MG  1.7  1.7  1.8     No components found for: GLPOC  No results for input(s): PH, PCO2, PO2, HCO3, FIO2 in the last 72 hours.   No results for input(s): INR in the last 72 hours. No lab exists for component: INREXT, INREXT, INREXT    Other pertinent lab:          Assessment:     Principal Problem: Bowel obstruction (1/24/2018)    Active Problems:    Bladder cancer ([facility] [address] (8/7/2013)      Nicotine dependence (3/27/2009)      Metastatic disease ([facility] [address] (1/24/2018)      Intractable abdominal pain (1/24/2018)      Chemotherapy-induced neuropathy ([facility] [address] (1/24/2018)      Alcohol abuse (1/24/2018)           Plan:     Risk of deterioration: medium             1. Bowel obstruction: status post laparotomy, good ostomy output. Followed by surgery. 2. Metastatic bladder cancer: Dr. Karen Fay has explained to the patient that the imaging studies support progression of his metastatic bladder cancer. His ECOG performance status remains poor. He is not a candidate for any additional systemic therapy. Hospice is recommended. The patient is a DNR.          Total time spent with patient:20 minutes                  Care Plan discussed with: Nursing Staff and Consultant/Specialist    Discussed:  Care Plan    Prophylaxis:  Lovenox    Disposition:  TBD           ___________________________________________________    Attending Physician: Salty Gallagher NP

## 2018-02-01 NOTE — PROGRESS NOTES
Problem: Self Care Deficits Care Plan (Adult)  Goal: *Acute Goals and Plan of Care (Insert Text)  Occupational Therapy Goals  Initiated 1/26/2018 within 7 day(s). 1.  Patient will perform grooming with supervision/set-up   2. Patient will perform upper body dressing with supervision/set-up. 3.  Patient will perform lower body dressing with supervision/set-up with adaptive strategies   4. Patient will perform toilet transfers with supervision/set-up. 5.  Patient will perform all aspects of toileting with supervision/set-up. 6.  Patient will participate in upper extremity therapeutic exercise/activities with supervision/set-up in preparation for self care tasks   Outcome: Progressing Towards Goal  Occupational Therapy ReEVALUATION    Patient: Les Mckee (9 y.o. male)  Date: 2/1/2018  Diagnosis: SBO (small bowel obstruction)  Abdominal pain  Bladder cancer ([facility] [address]  Metastatic disease ([facility] [address]  Intractable abdominal pain  DX Bowel obstruction  Procedure(s) (LRB):  LOOP COLOSTOMY FROM DESCENDING COLON (N/A) 7 Days Post-Op  Precautions: Fall, Skin, Aspiration    ASSESSMENT :  Based on the objective data described below, the patient was in bed upon arrival. Patient needed CGA with rolling walker to simulate toilet transfer/perfrom functional mobility tasks. Patient needed min/mod cues for safety and walker management. Patient needed min/mod A to simulate LE self care tasks. Patient left comfortable in bed with sitter present. Patient will benefit from skilled intervention to address the above impairments.   Patients rehabilitation potential is considered to be Good  Factors which may influence rehabilitation potential include:   []                None noted  []                Mental ability/status  [x]                Medical condition  []                Home/family situation and support systems  []                Safety awareness  []                Pain tolerance/management  []                Other: PLAN :  Recommendations and Planned Interventions:  [x]                  Self Care Training                  [x]           Therapeutic Activities  [x]                  Functional Mobility Training    []           Cognitive Retraining  [x]                  Therapeutic Exercises           [x]           Endurance Activities  [x]                  Balance Training                   []           Neuromuscular Re-Education  []                  Visual/Perceptual Training     [x]      Home Safety Training  [x]                  Patient Education                 []           Family Training/Education  []                  Other (comment):    Frequency/Duration: Patient will be followed by occupational therapy 1-2 times per day/ 3-5 days per week to address goals. Discharge Recommendations: Rafael Slater  Further Equipment Recommendations for Discharge: TBD     Barriers to Learning/Limitations: yes;  physical  Compensate with: visual, verbal, tactile, kinesthetic cues/model     G-CODES:     Self Care  Current  CJ= 20-39%   Goal  CI= 1-19%. The severity rating is based on the Level of Assistance required for Functional Mobility and ADLs. SUBJECTIVE:   Patient stated I feel better.     OBJECTIVE DATA SUMMARY:   Hospital course since last seen and reason for reevaluation: length of stay  Cognitive/Behavioral Status:  Neurologic State: Alert  Orientation Level: Oriented to person, Oriented to place  Cognition: Decreased attention/concentration, Poor safety awareness  Safety/Judgement: Fall prevention    Skin: No skin changes noted    Edema: No edema noted    Vision/Perceptual:       Acuity: Within Defined Limits      Coordination:  Coordination: Within functional limits (BUEs)       Balance:  Sitting: Intact  Standing: Impaired; With support  Standing - Static: Fair  Standing - Dynamic : Fair    Strength:  Strength: Generally decreased, functional ( strength)     Tone & Sensation:  Tone: Normal (BUEs)  Sensation: Intact (BUEs)     Range of Motion:  AROM: Within functional limits (BUEs)      Functional Mobility and Transfers for ADLs:  Bed Mobility:  Supine to Sit: Stand-by asssistance  Sit to Supine: Contact guard assistance  Scooting: Stand-by asssistance  Transfers:  Sit to Stand: Contact guard assistance    Toilet Transfer : Contact guard assistance (simulated with rolling walker)      ADL Assessment:(clinical judgement)  Feeding: Independent    Oral Facial Hygiene/Grooming: Contact guard assistance    Bathing: Minimum assistance; Moderate assistance    Upper Body Dressing: Contact guard assistance    Lower Body Dressing: Minimum assistance; Moderate assistance    Toileting: Minimum assistance     Pain:  Pt reports 0/10 pain or discomfort prior to treatment.    Pt reports 0/10 pain or discomfort post treatment. Activity Tolerance:   Fair    Please refer to the flowsheet for vital signs taken during this treatment. After treatment:   [] Patient left in no apparent distress sitting up in chair  [x] Patient left in no apparent distress in bed  [x] Call bell left within reach  [] Nursing notified  [x] Sitter present  [] Bed alarm activated    COMMUNICATION/EDUCATION:   []    Home safety education was provided and the patient/caregiver indicated understanding. [x]    Patient/family have participated as able in goal setting and plan of care. [x]    Patient/family agree to work toward stated goals and plan of care. []    Patient understands intent and goals of therapy, but is neutral about his/her participation. []    Patient is unable to participate in goal setting and plan of care. This patients plan of care is appropriate for delegation to [facility].     Thank you for this referral.  Funmi Duffy OTR/L  Time Calculation: 19 mins

## 2018-02-01 NOTE — PROGRESS NOTES
Problem: Mobility Impaired (Adult and Pediatric)  Goal: *Acute Goals and Plan of Care (Insert Text)  Physical Therapy Goals  Initiated 1/25/2018 and to be accomplished within 7 day(s)  Re-Eval 2/1/2018  1. Patient will move from supine to sit and sit to supine, scoot up and down and roll side to side in bed with supervision/set-up. 2.  Patient will transfer from bed to chair and chair to bed with supervision/set-up using the least restrictive device. 3.  Patient will perform sit to stand with supervision/set-up. 4.  Patient will ambulate with supervision/set-up for >250 feet with the least restrictive device. 5.  Patient will ascend/descend 2 stairs with 1 handrail(s) with supervision/set-up. physical Therapy RE-EVALUATION and TREATMENT    Patient: Meri Ho (47 y.o. male)  Date: 2/1/2018  Diagnosis: SBO (small bowel obstruction)  Abdominal pain  Bladder cancer ([facility])  Metastatic disease ([facility])  Intractable abdominal pain  DX Bowel obstruction  Procedure(s) (LRB):  LOOP COLOSTOMY FROM DESCENDING COLON (N/A) 7 Days Post-Op  Precautions: Aspiration    ASSESSMENT:  Patient presents today alert and agreeable to therapy and was supine in bed upon arrival. Patient was oriented to self, place, and time with sitter in room. Patient transferred to sitting EOB and presents today alert and agreeable to therapy and performed objective assessment. Patient then transferred to standing with CG assist and required CG for balance standing EOB. Patient then ambulated 5ft with HHA and was fitted for RW. Patient then ambulated 250ft with RW and required CG assist with VC's for keeping walker close to Eddie and for directions with turns including widening Eddie. Patient demonstrated decreased dual tasking ability with standing rest breaks during therapist speaking with patient. Patient also demonstrated fair carryover of learning.  Patient transferred back to supine at conclusion of session and was left resting with call frances by his side and sitter in room. Patient's progression toward goals since last assessment: Patient is progressing well and goals adjusted     PLAN:  Goals have been updated based on progression since last assessment. Patient continues to benefit from skilled intervention to address the above impairments. Continue to follow the patient 1-2 times per day/4-7 days per week to address goals. Planned Interventions:  [x]     Bed Mobility Training          [x]     Neuromuscular Re-Education  [x]     Transfer Training                []    Orthotic/Prosthetic Training  [x]     Gait Training                       []     Modalities  [x]     Therapeutic Exercises       []     Edema Management/Control  [x]     Therapeutic Activities         [x]     Patient and Family Training/Education  []     Other (comment):  Discharge Recommendations: Rafael Slater  Further Equipment Recommendations for Discharge: N/A     G-CODES:     Mobility  Current  CI= 1-19%   Goal  CI= 1-19%. The severity rating is based on the Level of Assistance required for Functional Mobility and ADLs. SUBJECTIVE:   Patient stated I feel better.     OBJECTIVE DATA SUMMARY:   Critical Behavior:  Neurologic State: Alert  Orientation Level: Oriented X4  Cognition: Follows commands  Safety/Judgement: Fall prevention  Functional Mobility Training:  Bed Mobility:  Rolling: Stand-by asssistance  Supine to Sit: Stand-by asssistance  Sit to Supine: Contact guard assistance   Transfers:  Sit to Stand: Contact guard assistance  Stand to Sit: Contact guard assistance    Balance:  Sitting: Intact  Standing: Impaired; With support  Standing - Static: Fair  Standing - Dynamic : Fair  Ambulation/Gait Training:  Distance (ft): 250 Feet (ft)   Ambulation - Level of Assistance: Contact guard assistance   Gait Abnormalities: Decreased step clearance   Base of Support: Widened   Speed/Sudha: Slow   Interventions: Verbal cues; Visual/Demos    Pain: 0/10 pre and post  Activity Tolerance:   Patient tolerated activity well and was left resting   Please refer to the flowsheet for vital signs taken during this treatment.   After treatment:   []  Patient left in no apparent distress sitting up in chair  [x]  Patient left in no apparent distress in bed  [x]  Call bell left within reach  []  Nursing notified  [x]  Sitter present  []  Bed alarm activated    Nieves Spangler, PT   Time Calculation: 20 mins

## 2018-02-01 NOTE — PROGRESS NOTES
Dinesh LEWIS [facility], M.D. FACS  PROGRESS NOTE    Name: Paola Erickson MRN: [id number]   : 1955 [facility]   Date: 2018 Admission Date: 2018  2:12 PM     Hospital Day: 9  7 Days Post-Op  Subjective:  Pt without c/o. Tolerating GI lite diet. Stoma working well. Objective:  Vitals:    183 18 0040 18 0555 18 0811   BP: 129/65 126/69 122/67 123/72   Pulse: 83 76 73 73   Resp:    Temp: 98.7 °F (37.1 °C) 98 °F (36.7 °C) 98.3 °F (36.8 °C) 98 °F (36.7 °C)   SpO2: 97% 93% 94% 93%   Weight:       Height:           Date 18 0700 - 18 0659 18 0700 - 18 0659   Shift 3757-6012 4352-3267 24 Hour Total 0769-1118 0026-4618 24 Hour Total   I  N  T  A  K  E   P.O. 240 150 390         P.O. 240 150 390       Shift Total  (mL/kg) 240  (3.6) 150  (2.2) 390  (5.8)      O  U  T  P  U  T   Urine  (mL/kg/hr) 1200  (1.5) 1400  (1.7) 2600  (1.6)         Urine Output (mL) (Urinary Catheter 18 Patel) 1200 1400 2600       Stool  100 100         Output (ml) (Colostomy 18 Left; Lower  Abdomen)  100 100       Shift Total  (mL/kg) 1200  (18) 1500  (22.5) 2700  (40.5)      NET -960 -1350 -2310      Weight (kg) 66.7 66.7 66.7 66.7 66.7 66.7         Physical Exam:    General: A&A, NAD, Ox4    Abdomen: abdomen is soft with LLQ tenderness. Colostomy is pink  and viable. Incision(s) are C/D/I.   No masses, organomegaly or  guarding    Labs:  Recent Results (from the past 24 hour(s))   MAGNESIUM    Collection Time: 18  5:32 AM   Result Value Ref Range    Magnesium 1.7 1.6 - 2.6 mg/dL   CBC WITH AUTOMATED DIFF    Collection Time: 18  5:32 AM   Result Value Ref Range    WBC 7.9 4.6 - 13.2 K/uL    RBC 4.15 (L) 4.70 - 5.50 M/uL    HGB 13.6 13.0 - 16.0 g/dL    HCT 39.0 36.0 - 48.0 %    MCV 94.0 74.0 - 97.0 FL    MCH 32.8 24.0 - 34.0 PG    MCHC 34.9 31.0 - 37.0 g/dL    RDW 13.2 11.6 - 14.5 %    PLATELET 671 176 - 083 K/uL    MPV 9.9 9.2 - 11.8 FL    NEUTROPHILS 64 40 - 73 %    LYMPHOCYTES 16 (L) 21 - 52 %    MONOCYTES 10 3 - 10 %    EOSINOPHILS 9 (H) 0 - 5 %    BASOPHILS 1 0 - 2 %    ABS. NEUTROPHILS 5.1 1.8 - 8.0 K/UL    ABS. LYMPHOCYTES 1.3 0.9 - 3.6 K/UL    ABS. MONOCYTES 0.8 0.05 - 1.2 K/UL    ABS. EOSINOPHILS 0.7 (H) 0.0 - 0.4 K/UL    ABS.  BASOPHILS 0.0 0.0 - 0.1 K/UL    DF AUTOMATED     METABOLIC PANEL, BASIC    Collection Time: 02/01/18  5:32 AM   Result Value Ref Range    Sodium 142 136 - 145 mmol/L    Potassium 4.1 3.5 - 5.5 mmol/L    Chloride 110 (H) 100 - 108 mmol/L    CO2 25 21 - 32 mmol/L    Anion gap 7 3.0 - 18 mmol/L    Glucose 136 (H) 74 - 99 mg/dL    BUN 5 (L) 7.0 - 18 MG/DL    Creatinine 0.57 (L) 0.6 - 1.3 MG/DL    BUN/Creatinine ratio 9 (L) 12 - 20      GFR est AA >60 >60 ml/min/1.73m2    GFR est non-AA >60 >60 ml/min/1.73m2    Calcium 8.4 (L) 8.5 - 10.1 MG/DL     All Micro Results     None          Current Medications:  Current Facility-Administered Medications   Medication Dose Route Frequency Provider Last Rate Last Dose    haloperidol lactate (HALDOL) injection 2 mg  2 mg IntraMUSCular DAILY Jackson Mujica MD        ondansetron (ZOFRAN ODT) tablet 4 mg  4 mg Oral Q6H PRN Jackson Mujica MD   4 mg at 01/31/18 2136    HYDROmorphone (DILAUDID) tablet 2 mg  2 mg Oral Q4H PRN Jackson Mujica MD   2 mg at 02/01/18 0548    fentaNYL (DURAGESIC) 25 mcg/hr patch 1 Patch  1 Patch TransDERmal Q72H Jackson Mujica MD   1 Patch at 01/31/18 1314    multivitamin, tx-iron-ca-min (THERA-M w/ IRON) tablet 1 Tab  1 Tab Oral DAILY Jackson Mujica MD        dextrose 5% - 0.45% NaCl with KCl 20 mEq/L infusion  100 mL/hr IntraVENous CONTINUOUS Alberto Dobbins  mL/hr at 01/31/18 2135 100 mL/hr at 01/31/18 2135    famotidine (PF) (PEPCID) injection 20 mg  20 mg IntraVENous DAILY Lori Cifuentes MD   20 mg at 01/31/18 0817    sodium chloride (NS) flush 5-10 mL  5-10 mL IntraVENous PRN Aleja Martin, DO        enoxaparin (LOVENOX) injection 40 mg  40 mg SubCUTAneous Q24H Christopher [place], DO   40 mg at 01/31/18 2136    nicotine (NICODERM CQ) 14 mg/24 hr patch 1 Patch  1 Patch TransDERmal DAILY Tristin Ferguson MD   1 Patch at 01/31/18 0819    sodium chloride (NS) flush 5-10 mL  5-10 mL IntraVENous Q8H Tristin Ferguson MD   5 mL at 02/01/18 0548    sodium chloride (NS) flush 5-10 mL  5-10 mL IntraVENous PRN Tristin Ferguson MD   10 mL at 01/27/18 2041    naloxone (NARCAN) injection 0.4 mg  0.4 mg IntraVENous EVERY 2 MINUTES AS NEEDED Tristin Ferguson MD        sodium chloride (NS) flush 5-10 mL  5-10 mL IntraVENous PRN Christopher Eun, DO           Chart and notes reviewed. Data reviewed. I have evaluated and examined the patient. IMPRESSION:   · Pt doing well POD 7.        PLAN:/DISCUSION:   · F/u in 2 weeks for suture removal.         Kem Marrero MD

## 2018-02-01 NOTE — MED STUDENT NOTES
*ATTENTION:  This note has been created by a medical student for educational purposes only. Please do not refer to the content of this note for clinical decision-making, billing, or other purposes. Please see attending physicians note to obtain clinical information on this patient. *       Progress Note  PFM EVMS Service    Patient: Tami Muniz MRN: [id number]   SSN: [id number]  YOB: 1955   Age: 70 y.o. Sex: male      Admit Date: 1/24/2018    LOS: 8 days   Chief Complaint   Patient presents with    Abdominal Pain       Subjective:     POD 7. NAEO. Pt seen by wound care, noted peristomal skin irritation, eakins barrier ring applied. Pt ambulated in solo with PT/OT this AM, ~200 feet with rolling walker. Feels much improved today, c/o \"jitters\" today, still notes nausea and some pain across back/flanks mostly controlled. Is passing flatus from rectum. Tentative D/C to SNF today. Review of Systems:  Pt endorsed back/flank pain, nausea, \"jitters\"  Denied HA, dizziness, fever, chills, cough, SOB, wheezing, CP, palpitations, LE edema, vomiting, abd pain, numbness, myalgias. Objective:     Vitals:  Visit Vitals    /72    Pulse 73    Temp 98 °F (36.7 °C)    Resp 18    Ht 5' 9\" (1.753 m)    Wt 66.7 kg (147 lb 1.5 oz)    SpO2 93%    BMI 21.72 kg/m2       Physical Exam:   General:  Alert, ambulating in hallway with PT/OT, no distress, chronically ill appearing. HEENT: NCAT, EOMI, no scleral icterus, poor dentition.    Lungs: Clear to auscultation bilat, on RA. Heart:  Regular rate and rhythm, no murmur, rub or gallop. Abdomen:  Nondistended, soft, moderate tenderness, some guarding, no rebound. Bowel sounds normal. Ostomy LLQ with soft brown stool, unable to appreciate stoma due to stool. Patel with clear straw urine, minimal hematuria in bag. Extremities: Extremities normal, atraumatic, no cyanosis or edema. Pulses: 2+ and symmetric all extremities.    Skin: Skin warm and dry. No rashes or lesions   Neurologic: CNII-XII grossly intact. Moves all extremities, sensation intact throughout.        Intake and Output:  Current Shift:    Last three shifts: 01/30 1901 - 02/01 0700  In: 390 [P.O.:390]  Out: 4200 [Urine:4100]     Medications:    Current Facility-Administered Medications:     haloperidol lactate (HALDOL) injection 2 mg, 2 mg, IntraMUSCular, DAILY, Tomy Paulson MD    ondansetron (ZOFRAN ODT) tablet 4 mg, 4 mg, Oral, Q6H PRN, Tomy Paulson MD, 4 mg at 01/31/18 2136    HYDROmorphone (DILAUDID) tablet 2 mg, 2 mg, Oral, Q4H PRN, Tomy Paulson MD, 2 mg at 02/01/18 0548    fentaNYL (DURAGESIC) 25 mcg/hr patch 1 Patch, 1 Patch, TransDERmal, Q72H, Tomy Paulson MD, 1 Patch at 01/31/18 1314    multivitamin, tx-iron-ca-min (THERA-M w/ IRON) tablet 1 Tab, 1 Tab, Oral, DAILY, Tomy Paulson MD    dextrose 5% - 0.45% NaCl with KCl 20 mEq/L infusion, 100 mL/hr, IntraVENous, CONTINUOUS, Mary Friedman MD, Last Rate: 100 mL/hr at 01/31/18 2135, 100 mL/hr at 01/31/18 2135    famotidine (PF) (PEPCID) injection 20 mg, 20 mg, IntraVENous, DAILY, Isa Anne MD, 20 mg at 01/31/18 0817    sodium chloride (NS) flush 5-10 mL, 5-10 mL, IntraVENous, PRN, Clemente Arriaza DO    enoxaparin (LOVENOX) injection 40 mg, 40 mg, SubCUTAneous, Q24H, Clemente Arriaza DO, 40 mg at 01/31/18 2136    nicotine (NICODERM CQ) 14 mg/24 hr patch 1 Patch, 1 Patch, TransDERmal, DAILY, Hank Be MD, 1 Patch at 01/31/18 0819    sodium chloride (NS) flush 5-10 mL, 5-10 mL, IntraVENous, Q8H, Hank Be MD, 5 mL at 02/01/18 0548    sodium chloride (NS) flush 5-10 mL, 5-10 mL, IntraVENous, PRN, Hank Be MD, 10 mL at 01/27/18 2041    naloxone (NARCAN) injection 0.4 mg, 0.4 mg, IntraVENous, EVERY 2 MINUTES AS NEEDED, Hank Be MD    sodium chloride (NS) flush 5-10 mL, 5-10 mL, IntraVENous, PRN, Clemente Arriaza,     Lab/Data Review:  Recent Results (from the past 12 hour(s))   MAGNESIUM    Collection Time: 02/01/18  5:32 AM   Result Value Ref Range    Magnesium 1.7 1.6 - 2.6 mg/dL   CBC WITH AUTOMATED DIFF    Collection Time: 02/01/18  5:32 AM   Result Value Ref Range    WBC 7.9 4.6 - 13.2 K/uL    RBC 4.15 (L) 4.70 - 5.50 M/uL    HGB 13.6 13.0 - 16.0 g/dL    HCT 39.0 36.0 - 48.0 %    MCV 94.0 74.0 - 97.0 FL    MCH 32.8 24.0 - 34.0 PG    MCHC 34.9 31.0 - 37.0 g/dL    RDW 13.2 11.6 - 14.5 %    PLATELET 324 972 - 235 K/uL    MPV 9.9 9.2 - 11.8 FL    NEUTROPHILS 64 40 - 73 %    LYMPHOCYTES 16 (L) 21 - 52 %    MONOCYTES 10 3 - 10 %    EOSINOPHILS 9 (H) 0 - 5 %    BASOPHILS 1 0 - 2 %    ABS. NEUTROPHILS 5.1 1.8 - 8.0 K/UL    ABS. LYMPHOCYTES 1.3 0.9 - 3.6 K/UL    ABS. MONOCYTES 0.8 0.05 - 1.2 K/UL    ABS. EOSINOPHILS 0.7 (H) 0.0 - 0.4 K/UL    ABS. BASOPHILS 0.0 0.0 - 0.1 K/UL    DF AUTOMATED     METABOLIC PANEL, BASIC    Collection Time: 02/01/18  5:32 AM   Result Value Ref Range    Sodium 142 136 - 145 mmol/L    Potassium 4.1 3.5 - 5.5 mmol/L    Chloride 110 (H) 100 - 108 mmol/L    CO2 25 21 - 32 mmol/L    Anion gap 7 3.0 - 18 mmol/L    Glucose 136 (H) 74 - 99 mg/dL    BUN 5 (L) 7.0 - 18 MG/DL    Creatinine 0.57 (L) 0.6 - 1.3 MG/DL    BUN/Creatinine ratio 9 (L) 12 - 20      GFR est AA >60 >60 ml/min/1.73m2    GFR est non-AA >60 >60 ml/min/1.73m2    Calcium 8.4 (L) 8.5 - 10.1 MG/DL       Key Findings or tests:   DATE TEST RESULT OR IMPRESSION                              Telemetry Cardiac   Oxygen NONE     Assessment and Plan:     Estefany Hill is a 57 y.o. Male with PMH of metastatic bladder CA s/p chemo and XRT, DM, EtOH abuse, admitted on 1/24/18 for LBO now s/p diverting loop colostomy. Admitted with agitation and suspected EtOH withdrawal, now improved. Hypokalemia now resolved.  He is POD 7, tolerating advanced diet, though ostomy still with low output, ambulating in halls, mental status much improved, tentative plans for DC to SNF for rehab today. Afebrile, hemodynamically stable. LBO s/p loop colostomy POD 7  - h/o met bladder CA s/p chemo and XRT  - loop colostomy performed 1/25/18  - tolerating GI lite diet  - ostomy noted with peristomal skin irritation, barrier ring applied, continue wound care  - ostomy  > 175 > 100  - ostomy with low output, pt with poor PO intake, encourage Ensures at all meals.      Progressive Met Bladder CA s/p chemo and XRT  - Oncology following, recs hospice  - chronic winslow, to change at next OP appt  - on fentanyl patch, dilaudid PRN for pain control    EtOH withdrawal/Acute delirium  - admitted with agitation now improved, now noted with memory impairment, has h/o EtOH abuse though suspect poss brain mets may be contributing  - improving, no longer requires restraints  - on scheduled Haldol, weaning    Hypokalemia, resolved  - 3.2 > 3.3 > 4.1  - Urine K 10 suggesting unlikely renal loss  - suspect 2/2 to GI losses vs poor PO intake  - improving, on replacement  - continue to monitor      Diet  GI Lite   DVT Prophylax  Lovenox   GI Prophylaxis  Pepcid   Code status  DNR   Disposition  [facility]        Nataliya Xavier , MS4   February 1, 2018

## 2018-02-01 NOTE — PROGRESS NOTES
Met with patient at bedside, much more alert and oriented, remains without restraints. Page sent to Jayde Kamara MD, regarding if ready to DC this AM  Message left with Josie Morelos [facility], re: if able to accept today as planned.   IMM signed/date/filed in patient chart and copy to patient  Communication with team re: if DC will occur today, pending rounds at this time, will let me know  Communication with sister, Nikko Fuentes, of possible DC today to [facility], in agreement with plan  Set up transport as \"Will Call\" with [facility]  CM will continue to follow with IDT to assist with DC needs identified    1135 am  Per IDT patient is ready for DC  Communication with Caitlin Rivera at [facility], have accepted  TXU Doris up for 130 pm, [facility] notified along with patient/family/staff  Requested staff to send ALL of ostomy supplies with patient when DC

## 2018-02-04 ENCOUNTER — HOSPITAL ENCOUNTER (OUTPATIENT)
Dept: LAB | Age: 63
Discharge: HOME | End: 2018-02-04

## 2018-02-04 PROCEDURE — 85025 COMPLETE CBC W/AUTO DIFF WBC: CPT | Performed by: FAMILY MEDICINE

## 2018-02-04 PROCEDURE — 83735 ASSAY OF MAGNESIUM: CPT | Performed by: FAMILY MEDICINE

## 2018-02-04 PROCEDURE — 80053 COMPREHEN METABOLIC PANEL: CPT | Performed by: FAMILY MEDICINE

## 2018-02-05 ENCOUNTER — HOSPITAL ENCOUNTER (OUTPATIENT)
Dept: LAB | Age: 63
Discharge: HOME | End: 2018-02-05

## 2018-02-05 LAB
ALBUMIN SERPL-MCNC: 2.8 G/DL (ref 3.4–5)
ALBUMIN SERPL-MCNC: 3 G/DL (ref 3.4–5)
ALBUMIN/GLOB SERPL: 0.8 {RATIO} (ref 0.8–1.7)
ALBUMIN/GLOB SERPL: 0.8 {RATIO} (ref 0.8–1.7)
ALP SERPL-CCNC: 73 U/L (ref 45–117)
ALP SERPL-CCNC: 86 U/L (ref 45–117)
ALT SERPL-CCNC: 21 U/L (ref 16–61)
ALT SERPL-CCNC: 25 U/L (ref 16–61)
ANION GAP SERPL CALC-SCNC: 10 MMOL/L (ref 3–18)
ANION GAP SERPL CALC-SCNC: 5 MMOL/L (ref 3–18)
AST SERPL-CCNC: 26 U/L (ref 15–37)
AST SERPL-CCNC: 33 U/L (ref 15–37)
BASOPHILS # BLD: 0.1 K/UL (ref 0–0.1)
BASOPHILS NFR BLD: 1 % (ref 0–2)
BILIRUB SERPL-MCNC: 0.4 MG/DL (ref 0.2–1)
BILIRUB SERPL-MCNC: 0.6 MG/DL (ref 0.2–1)
BUN SERPL-MCNC: 12 MG/DL (ref 7–18)
BUN SERPL-MCNC: 15 MG/DL (ref 7–18)
BUN/CREAT SERPL: 17 (ref 12–20)
BUN/CREAT SERPL: 21 (ref 12–20)
CALCIUM SERPL-MCNC: 8.6 MG/DL (ref 8.5–10.1)
CALCIUM SERPL-MCNC: 8.7 MG/DL (ref 8.5–10.1)
CHLORIDE SERPL-SCNC: 104 MMOL/L (ref 100–108)
CHLORIDE SERPL-SCNC: 99 MMOL/L (ref 100–108)
CO2 SERPL-SCNC: 29 MMOL/L (ref 21–32)
CO2 SERPL-SCNC: 31 MMOL/L (ref 21–32)
CREAT SERPL-MCNC: 0.71 MG/DL (ref 0.6–1.3)
CREAT SERPL-MCNC: 0.72 MG/DL (ref 0.6–1.3)
DIFFERENTIAL METHOD BLD: ABNORMAL
EOSINOPHIL # BLD: 0.9 K/UL (ref 0–0.4)
EOSINOPHIL NFR BLD: 13 % (ref 0–5)
ERYTHROCYTE [DISTWIDTH] IN BLOOD BY AUTOMATED COUNT: 12.9 % (ref 11.6–14.5)
GLOBULIN SER CALC-MCNC: 3.3 G/DL (ref 2–4)
GLOBULIN SER CALC-MCNC: 3.8 G/DL (ref 2–4)
GLUCOSE SERPL-MCNC: 123 MG/DL (ref 74–99)
GLUCOSE SERPL-MCNC: 138 MG/DL (ref 74–99)
HCT VFR BLD AUTO: 39.1 % (ref 36–48)
HGB BLD-MCNC: 13.7 G/DL (ref 13–16)
LYMPHOCYTES # BLD: 1.2 K/UL (ref 0.9–3.6)
LYMPHOCYTES NFR BLD: 17 % (ref 21–52)
MAGNESIUM SERPL-MCNC: 1.9 MG/DL (ref 1.6–2.6)
MAGNESIUM SERPL-MCNC: 1.9 MG/DL (ref 1.6–2.6)
MCH RBC QN AUTO: 33.2 PG (ref 24–34)
MCHC RBC AUTO-ENTMCNC: 35 G/DL (ref 31–37)
MCV RBC AUTO: 94.7 FL (ref 74–97)
MONOCYTES # BLD: 0.9 K/UL (ref 0.05–1.2)
MONOCYTES NFR BLD: 13 % (ref 3–10)
NEUTS SEG # BLD: 3.9 K/UL (ref 1.8–8)
NEUTS SEG NFR BLD: 56 % (ref 40–73)
PLATELET # BLD AUTO: 313 K/UL (ref 135–420)
PMV BLD AUTO: 10.3 FL (ref 9.2–11.8)
POTASSIUM SERPL-SCNC: 3.9 MMOL/L (ref 3.5–5.5)
POTASSIUM SERPL-SCNC: 4.4 MMOL/L (ref 3.5–5.5)
PROT SERPL-MCNC: 6.1 G/DL (ref 6.4–8.2)
PROT SERPL-MCNC: 6.8 G/DL (ref 6.4–8.2)
RBC # BLD AUTO: 4.13 M/UL (ref 4.7–5.5)
SODIUM SERPL-SCNC: 138 MMOL/L (ref 136–145)
SODIUM SERPL-SCNC: 140 MMOL/L (ref 136–145)
WBC # BLD AUTO: 7 K/UL (ref 4.6–13.2)

## 2018-02-05 PROCEDURE — 83735 ASSAY OF MAGNESIUM: CPT | Performed by: FAMILY MEDICINE

## 2018-02-05 PROCEDURE — 80053 COMPREHEN METABOLIC PANEL: CPT | Performed by: FAMILY MEDICINE

## 2018-02-06 LAB
BASOPHILS # BLD: 0.1 K/UL (ref 0–0.1)
BASOPHILS NFR BLD: 1 % (ref 0–2)
DIFFERENTIAL METHOD BLD: ABNORMAL
EOSINOPHIL # BLD: 0.8 K/UL (ref 0–0.4)
EOSINOPHIL NFR BLD: 11 % (ref 0–5)
ERYTHROCYTE [DISTWIDTH] IN BLOOD BY AUTOMATED COUNT: 12.9 % (ref 11.6–14.5)
HCT VFR BLD AUTO: 39.6 % (ref 36–48)
HGB BLD-MCNC: 13.7 G/DL (ref 13–16)
LYMPHOCYTES # BLD: 1.2 K/UL (ref 0.9–3.6)
LYMPHOCYTES NFR BLD: 18 % (ref 21–52)
MCH RBC QN AUTO: 33 PG (ref 24–34)
MCHC RBC AUTO-ENTMCNC: 34.6 G/DL (ref 31–37)
MCV RBC AUTO: 95.4 FL (ref 74–97)
MONOCYTES # BLD: 1 K/UL (ref 0.05–1.2)
MONOCYTES NFR BLD: 15 % (ref 3–10)
NEUTS SEG # BLD: 3.7 K/UL (ref 1.8–8)
NEUTS SEG NFR BLD: 55 % (ref 40–73)
PLATELET # BLD AUTO: 265 K/UL (ref 135–420)
PMV BLD AUTO: 10 FL (ref 9.2–11.8)
RBC # BLD AUTO: 4.15 M/UL (ref 4.7–5.5)
WBC # BLD AUTO: 6.8 K/UL (ref 4.6–13.2)

## 2018-02-10 ENCOUNTER — HOSPITAL ENCOUNTER (EMERGENCY)
Age: 63
Discharge: HOME | End: 2018-02-10
Attending: EMERGENCY MEDICINE | Admitting: EMERGENCY MEDICINE
Payer: MEDICARE

## 2018-02-10 VITALS
TEMPERATURE: 98.4 F | DIASTOLIC BLOOD PRESSURE: 63 MMHG | BODY MASS INDEX: 19.35 KG/M2 | SYSTOLIC BLOOD PRESSURE: 108 MMHG | OXYGEN SATURATION: 93 % | HEART RATE: 90 BPM | WEIGHT: 131 LBS | RESPIRATION RATE: 14 BRPM

## 2018-02-10 DIAGNOSIS — Z43.3 COLOSTOMY CARE (HCC): Primary | ICD-10-CM

## 2018-02-10 LAB — LACTATE BLD-SCNC: 2.4 MMOL/L (ref 0.4–2)

## 2018-02-10 PROCEDURE — 99284 EMERGENCY DEPT VISIT MOD MDM: CPT

## 2018-02-10 PROCEDURE — 83605 ASSAY OF LACTIC ACID: CPT

## 2018-02-10 NOTE — ED TRIAGE NOTES
Pt bought in by medics from home site around colostomy bag red and with drainage also warm to the touch. States pain started today.

## 2018-02-10 NOTE — ED PROVIDER NOTES
EMERGENCY DEPARTMENT HISTORY AND PHYSICAL EXAM    1:27 PM      Date: 2/10/2018  Patient Name: Maryanne Don    History of Presenting Illness     Chief Complaint   Patient presents with    Wound Check         History Provided By: Patient    Chief Complaint: Colostomy Bag Check   Duration: 2-3 Months  Timing:  Constant  Location: LUQ  Quality: Burning  Severity: Moderate  Modifying Factors: N/A  Associated Symptoms: Raw, itchy, & burning skin      Additional History (Context): Maryanne Don is a 70 y.o. male with a PMHx of HLD and Bladder cancer who presents to the ED via EMS from home. Patient states that the site around the colostomy bag is red so his home health sent him to the ED to be seen because they were worried about infection. Patient notes associated symptoms as the skin surrounding the site is raw, itchy, and burning and has been that way for 2-3 months. Patient notes that the pain is constant and located in the LUQ. The pain is described as burning and is moderate in severity. PCP: Joe Stokes MD    Current Outpatient Prescriptions   Medication Sig Dispense Refill    fentaNYL (DURAGESIC) 25 mcg/hr PATCH 1 Patch by TransDERmal route every seventy-two (72) hours for 15 days. Max Daily Amount: 1 Patch. Indications: Chronic Pain with Opioid Tolerance 5 Patch 0    HYDROmorphone (DILAUDID) 2 mg tablet Take 0.5 Tabs by mouth every four (4) hours as needed. Max Daily Amount: 6 mg. Indications: Severe Pain 45 Tab 0    multivitamin, tx-iron-ca-min (THERA-M W/ IRON) 9 mg iron-400 mcg tab tablet Take 1 Tab by mouth daily. 30 Tab 0    nicotine (NICODERM CQ) 14 mg/24 hr patch 1 Patch by TransDERmal route daily for 30 days. 30 Patch 0    ondansetron (ZOFRAN ODT) 4 mg disintegrating tablet Take 1 Tab by mouth every six (6) hours as needed. 30 Tab 0    gabapentin (NEURONTIN) 300 mg capsule Take 2 Caps by mouth three (3) times daily.  Indications: NEUROPATHIC PAIN 45 Cap 1    fentaNYL (DURAGESIC) 25 mcg/hr PATCH 1 Patch by TransDERmal route every seventy-two (72) hours. Past History     Past Medical History:  Past Medical History:   Diagnosis Date    Acute sinusitis, unspecified     Alcohol abuse, unspecified     Bladder cancer ([facility] [address] 2013    chemo & radiation    Cough     Depressive disorder     Hematuria     HLD (hyperlipidemia)     Impotence of organic origin     Insomnia, unspecified     Other abnormal glucose     Other seborrheic keratosis     Pain in joint, shoulder region        Past Surgical History:  Past Surgical History:   Procedure Laterality Date    COLONOSCOPY N/A 1/8/2018    COLONOSCOPY with disimpaction performed by Angelia Awad MD at [facility] N/A 01/25/2018    Dr. Yesi Medina  12/9/2014    Dr. Tae Mcmillan [address]  01/04/2017    Tore (R) arm tendon, had surgery at [address] [place] [address]  01/04/13    [facility], TURBT - low-grade stage TA, Dr Herlinda Dawson  05/27/14    [facility], Cysto-TURP, Transurethral resection/multiple bladder biopsies - Papillary Urothelial Hyperplasia, Dr Benjamin Carrel  1/29/15    Dr. Savage Mode - clot evacuation/fulguration       Family History:  Family History   Problem Relation Age of Onset    Hypertension Mother     Kidney Disease Father     Heart Disease Father     Diabetes Sister     Hypertension Brother     Stroke Brother        Social History:  Social History   Substance Use Topics    Smoking status: Current Some Day Smoker     Packs/day: 1.00     Years: 43.00     Types: Cigarettes    Smokeless tobacco: Never Used      Comment: using electronic cigarettes & nicotine patch    Alcohol use 0.0 oz/week      Comment: occassional       Allergies:   Allergies   Allergen Reactions    Codeine Nausea and Vomiting     \"ONLY IN VERY HIGH DOSES\"        Macrodantin [Nitrofurantoin Macrocrystal] Diarrhea, Nausea and Vomiting and Other (comments)     Made pt feel very confused    Other Plant, Animal, Environmental Other (comments)    Oxycodone Nausea and Vomiting     Other reaction(s): gi distress  \"ONLY IN VERY HIGH DOSES\"  \"ONLY IN VERY HIGH DOSES\"      Percocet [Oxycodone-Acetaminophen] Nausea and Vomiting     \"ONLY IN VERY HIGH DOSES\"        Pollen Extracts Runny Nose    Pravachol [Pravastatin] Nausea Only     Other reaction(s): gi distress    Vicodin [Hydrocodone-Acetaminophen] Nausea and Vomiting     Other reaction(s): gi distress  \"ONLY IN VERY HIGH DOSES\"  \"ONLY IN VERY HIGH DOSES\"      Zoloft [Sertraline] Nausea and Vomiting     Other reaction(s): gi distress         Review of Systems     Review of Systems   Constitutional: Negative for activity change, fatigue and fever. HENT: Negative for congestion and rhinorrhea. Eyes: Negative for visual disturbance. Respiratory: Negative for shortness of breath. Cardiovascular: Negative for chest pain and palpitations. Gastrointestinal: Negative for abdominal pain, diarrhea, nausea and vomiting. Genitourinary: Negative for dysuria and hematuria. Musculoskeletal: Negative for back pain. Skin: Negative for rash. Neurological: Negative for dizziness, weakness and light-headedness. All other systems reviewed and are negative. Physical Exam     Visit Vitals    BP 97/49    Pulse 90    Temp 98.4 °F (36.9 °C)    Resp 14    Wt 59.4 kg (131 lb)    SpO2 96%    BMI 19.35 kg/m2     Physical Exam   Constitutional: He is oriented to person, place, and time. He appears well-developed and well-nourished. No distress. HENT:   Head: Normocephalic and atraumatic. Right Ear: External ear normal.   Left Ear: External ear normal.   Nose: Nose normal.   Mouth/Throat: Oropharynx is clear and moist.   Eyes: Conjunctivae and EOM are normal. Pupils are equal, round, and reactive to light. No scleral icterus. Neck: Normal range of motion. Neck supple. No JVD present.  No tracheal deviation present. No thyromegaly present. Cardiovascular: Normal rate, regular rhythm, normal heart sounds and intact distal pulses. Exam reveals no gallop and no friction rub. No murmur heard. Pulmonary/Chest: Effort normal and breath sounds normal. He exhibits no tenderness. Abdominal: Soft. Bowel sounds are normal. He exhibits no distension. There is no tenderness. There is no rebound and no guarding. Colostomy LUQ with stool present/ Very mild erythema of surrounding skin. No breakdown. Musculoskeletal: Normal range of motion. He exhibits no edema or tenderness. Lymphadenopathy:     He has no cervical adenopathy. Neurological: He is alert and oriented to person, place, and time. No cranial nerve deficit. Coordination normal.   No sensory loss, Gait normal, Motor 5/5   Skin: Skin is warm and dry. Psychiatric: He has a normal mood and affect. His behavior is normal. Judgment and thought content normal.   Nursing note and vitals reviewed. Diagnostic Study Results     Labs -  Recent Results (from the past 12 hour(s))   POC LACTIC ACID    Collection Time: 02/10/18  1:06 PM   Result Value Ref Range    Lactic Acid (POC) 2.4 (HH) 0.4 - 2.0 mmol/L       Radiologic Studies -   No orders to display         Medical Decision Making   I am the first provider for this patient. I reviewed the vital signs, available nursing notes, past medical history, past surgical history, family history and social history. Records Reviewed: Nursing Notes (Time of Review: 1:27 PM)    ED Course: Progress Notes, Reevaluation, and Consults:  Colostomy dressing removed- area cleaned with water. And New bag applied. Procedures: Change colostomy bag. Diagnosis     Clinical Impression:   1. Colostomy care [facility])        Disposition: home. Follow up with primary surgeon. Follow-up Information     Follow up With Details Comments Justice Downing MD In 1 day Call Monday for appt. [address]             Patient's Medications   Start Taking    No medications on file   Continue Taking    FENTANYL (DURAGESIC) 25 MCG/HR PATCH    1 Patch by TransDERmal route every seventy-two (72) hours. FENTANYL (DURAGESIC) 25 MCG/HR PATCH    1 Patch by TransDERmal route every seventy-two (72) hours for 15 days. Max Daily Amount: 1 Patch. Indications: Chronic Pain with Opioid Tolerance    GABAPENTIN (NEURONTIN) 300 MG CAPSULE    Take 2 Caps by mouth three (3) times daily. Indications: NEUROPATHIC PAIN    HYDROMORPHONE (DILAUDID) 2 MG TABLET    Take 0.5 Tabs by mouth every four (4) hours as needed. Max Daily Amount: 6 mg. Indications: Severe Pain    MULTIVITAMIN, TX-IRON-CA-MIN (THERA-M W/ IRON) 9 MG IRON-400 MCG TAB TABLET    Take 1 Tab by mouth daily. NICOTINE (NICODERM CQ) 14 MG/24 HR PATCH    1 Patch by TransDERmal route daily for 30 days. ONDANSETRON (ZOFRAN ODT) 4 MG DISINTEGRATING TABLET    Take 1 Tab by mouth every six (6) hours as needed. These Medications have changed    No medications on file   Stop Taking    No medications on file     _______________________________    Attestations:  JENNIFER/Stephanie Glover [id number] acting as a scribe for and in the presence of Dutch Mckeon MD      February 10, 2018 at 1:27 PM       Provider Attestation:      I personally performed the services described in the documentation, reviewed the documentation, as recorded by the scribe in my presence, and it accurately and completely records my words and actions.  February 10, 2018 at 1:27 PM - Dutch Mckeon MD    _______________________________

## 2018-02-14 ENCOUNTER — TELEPHONE (OUTPATIENT)
Dept: SURGERY | Age: 63
End: 2018-02-14

## 2018-02-14 NOTE — TELEPHONE ENCOUNTER
I received a call from [phone] at 10:56am on 02/14/2018 from Mrs. Evon Macias stating that, \" she is unable to get Mr. Michaud to see Dr. Ramos Norris today for his 2 week post operative appointment. \" Mrs. Lesia Stokes also started that, Erika  has reached out to the nurse at [facility] named Frankey Bicker and her number to be reached at is [phone]. The Nurse Navigator is Toy Han and she is the individual that started the whole process. \" Mrs. Lesia Stokes is starting to become very frustrated due to the fact that no one from [facility] and [address] [place] [address] is helping Mr. Michaud. I explained to Mrs. Lesia Stokes that I would inform Dr. Ramos Norris about the situation and that I would make sure that we can find a way to get Mr. Michaud to see Dr. Ramos Norris to get his stiches and franny out. ..  Ayde Tom

## 2018-02-15 ENCOUNTER — PATIENT OUTREACH (OUTPATIENT)
Dept: CASE MANAGEMENT | Age: 63
End: 2018-02-15

## 2018-02-15 NOTE — PROGRESS NOTES
Community Care Team Documentation for Patient in [facility]     Patient discharged from [facility] 1/24/2018 - 2/1/2018 to [facility], on 2/1/2018. Hospital Discharge diagnosis:  Bowel obstruction, Bladder cancer     SNF Attending Provider:     Anticipated discharge date from SNF:        PCP : Lilliam Higgins MD    Nurse Navigator:     Notified today patient was discharged:D/C home 2/5 under hospice care with [facility]    Medium Risk            14       Total Score        14 Charlson Comorbidity Score (Age + Comorbid Conditions)        Criteria that do not apply:    Has Seen PCP in Last 6 Months (Yes=3, No=0)    . Living with Significant Other. Assisted Living. LTAC. SNF. or   Rehab    Patient Length of Stay (>5 days = 3)    IP Visits Last 12 Months (1-3=4, 4=9, >4=11)    Pt.  Coverage (Medicare=5 , Medicaid, or Self-Pay=4)

## 2018-02-17 ENCOUNTER — HOSPITAL ENCOUNTER (EMERGENCY)
Age: 63
Discharge: STILL ADMITTED OR ADMINISTRATIVE | End: 2018-02-17
Attending: EMERGENCY MEDICINE
Payer: MEDICARE

## 2018-02-17 PROCEDURE — 75810000275 HC EMERGENCY DEPT VISIT NO LEVEL OF CARE

## 2018-02-19 ENCOUNTER — TELEPHONE (OUTPATIENT)
Dept: SURGERY | Age: 63
End: 2018-02-19

## 2018-02-19 NOTE — TELEPHONE ENCOUNTER
I received a call from  Harjinder Mena (sisiter of Mr. Myrtle Bang) from [phone] at 09:09am, to inform me that Mr. Myrtle Bang had past away on Saturday and that we needed to cancel his appointment with Dr. Amy Wing. Explained to Mrs. Cheyenne Thayer that if there is anything that she needs, to give us a call. Aj Roman

## 2020-08-25 NOTE — ED NOTES
Bedside and Verbal report given to Rohan Mendosa RN (oncoming nurse) by Martina Giles RN (offgoing nurse). Report included the following information SBAR.
Bedside report given to Vick Deleon
Bedside shift change report given to Jeffrey Larry RN (oncoming nurse) by Wing Judy RN (offgoing nurse). Report included the following information SBAR, ED Summary and MAR.
I have reviewed discharge instructions with the patient. The patient verbalized understanding.
PT refuses to allow lab draws at this time, wants sister to do the lab draws, informed PT and sister that staff have to perform procedure, PT refuses, safety intact, will continue to monitor
Patient's sister arrived to  the patient.
Received bedside report from [address] [place] [address] [place], RN, PT resting in bed with family member at bedside, safety intact, will continue to monitor
The patient is becoming increasingly agitated about being here for alcohol intoxication and medication overdose for an attempted SI. The patient was in the room going through cabinets. Srikanth Orozco made aware. Murtaza Wyatt RN attempting to get a sitter.
Warm blankets provided for comfort, PT pacing around room, hallucinating and talking about things not present in room, reoriented to bed, safety intact, will continue to monitor
done
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
## 2022-10-17 NOTE — DISCHARGE SUMMARY
Discharge Summary  [facility] [facility]      Patient: Melanie Brock Age: 70 y.o. 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 Pt had been on fentanyl patch for pain related to metastatic cancer but was drinking a half gallon of bourbon every few days, supplied by his brother-in-law. Pt will be admitted to hospice after completing rehab at [facility]. Hospice nurse Dinorah Archer with [facility]: [phone]) aware of situation and will do safety assessment. Pending labs and studies:  None  Operative Procedures:   Loop descending colostomy on 18 by Ronda Cuenca MD    Discharge Medications:     Current Discharge Medication List      START taking these medications    Details   !! fentaNYL (DURAGESIC) 25 mcg/hr PATCH 1 Patch by TransDERmal route every seventy-two (72) hours for 15 days. Max Daily Amount: 1 Patch. Indications: Chronic Pain with Opioid Tolerance  Qty: 5 Patch, Refills: 0    Associated Diagnoses: Malignant neoplasm of urinary bladder, unspecified site (HCC)      HYDROmorphone (DILAUDID) 2 mg tablet Take 0.5 Tabs by mouth every four (4) hours as needed. Max Daily Amount: 6 mg.  Indications: Severe Pain  Qty: 45 Tab, Refills: 0    Associated Diagnoses: Malignant neoplasm of urinary bladder, unspecified site (HCC)      multivitamin, tx-iron-ca-min (THERA-M W/ IRON) 9 mg iron-400 mcg tab tablet Take 1 Tab by mouth daily. Qty: 30 Tab, Refills: 0      nicotine (NICODERM CQ) 14 mg/24 hr patch 1 Patch by TransDERmal route daily for 30 days. Qty: 30 Patch, Refills: 0      ondansetron (ZOFRAN ODT) 4 mg disintegrating tablet Take 1 Tab by mouth every six (6) hours as needed. Qty: 30 Tab, Refills: 0       !! - Potential duplicate medications found. Please discuss with provider. CONTINUE these medications which have CHANGED    Details   gabapentin (NEURONTIN) 300 mg capsule Take 2 Caps by mouth three (3) times daily. Indications: NEUROPATHIC PAIN  Qty: 45 Cap, Refills: 1         CONTINUE these medications which have NOT CHANGED    Details   !! fentaNYL (DURAGESIC) 25 mcg/hr PATCH 1 Patch by TransDERmal route every seventy-two (72) hours. !! - Potential duplicate medications found. Please discuss with provider. STOP taking these medications       docusate sodium (COL-RITE) 100 mg capsule Comments:   Reason for Stopping:         polyethylene glycol (MIRALAX) 17 gram packet Comments:   Reason for Stopping:         oxyCODONE IR (ROXICODONE) 10 mg tab immediate release tablet Comments:   Reason for Stopping:         oxybutynin chloride XL (DITROPAN XL) 10 mg CR tablet Comments:   Reason for Stopping:                 Disposition: SNF at [facility]    Consultants:    Rai Johnson MD- General surgery  Aleks Juarez, [facility],Veronica Woo MD- Hematology-Oncology    Brief Hospital Course (including pertinent history and physical findings)  Deirdre Bowers is a 57 y.o. male with PMH of malignant bladder cancer, urinary incontinence, chemotherapy-induced neuropathy, chronic constipation, nicotine dependence and alcohol abuse, who presented with complaint of constant, worsening, abdominal pain.  Pt had not had BM in 5 days, no flatus for a few days, and decreased PO d/t N/V. CT showed LBO, which was confirmed by, and not relieved by gastrograffin enema. Large Bowel Obstruction 2/2 metastasis vs post-radiatio changes vs stricture: General surgery consulted and patient taken to OR for loop colostomy. Post op course complicated by delirium/EtOH withdrawal so diet was advanced to clears on POD 4 and GI Lite on POD 5. Pt's ostomy had problems with leaking, ostomy care ordered and managed with ostomy paste and size adjustment leaking resolved with ostomy nurse supervision. Pt given Unasyn for 3 days at admission for concern for peritonitis but was discontinued after that d/t no signs of infection. Malignant Bladder Cancer/Urinary Incontinence: Patient is followed by Dr. Katherine Delgado (Urology), Dr. Kianna Marshall (Heme-Onc), & Dr. Emelina Hutchinson (Radiation Onc) as outpatient. Patient was diagnosed w/clinical Stage [id number] Large Urothelial Carcinoma of the Bladder in 2014 s/p chemoradiation w/Dr. Kianna Marshall and Dr. Emelina Hutchinson. Patient has a history of multiple TURBT's that show progressive metastatic bladder cancer. CT AP (11/17) showed progressive metastatic bladder cancer, enlarged lung nodules, progressed widespread lymphadenopathy, stable 3 cm tumor mass right posterior, moderate bilateral hydronephrosis, and new focus of L5 bone lucency. CT Chest (11/17) showed multiple pulmonary nodules, largest right lower lobe measuring 15 x 7 mm, bulky mediastinal lymph nodes and retroperitoneal lymphadenopathy. Heme-onc consulted and recommended hospice. Pt has been informed several times over the last few months about poor prognosis and lack of treatment options and has stopped going to heme-onc appointments. Pt given fentanyl 25mcg patch and diluadid 1mg Q4H for pain management at discharge. Discharging to SNF and will be admitted to hospice after discharge from SNF.      Nicotine Dependence/Alcohol Abuse/Acute Delirium/Alcohol Withdrawal: Patient has a long history of smoking and alcohol use and was reportedly consuming significant amounts of alcohol prior to admission. Sister and hospice nurse originally unaware of pt's EtOH history but are now aware and hospice nurse is planning on doing a safety assessment before admitting him. Pt on CIWA protocol during admission but protocol administration of ativan reduced to 1mg q4h PRN d/t oversedation. Haldol 2mg IM q8h scheduled as well to manage withdrawal/delirium. Haldol, ativan discontinued prior to discharge. Patient alert on discharge with no further episodes of agitation. Hypokalemia: K 3.6>3.2>3.2>4.1 on discharge. Likely d/t GI losses/being NPO. Have repleted several times during admission as well as added to IVF, advanced diet, and added nutritional supplements. Summarized key findings and results (labs, imaging studies, ECHO, cardiac cath, endoscopies, etc):  01/24/2018 LA 2.3>0.7  01/28/2018-02/01/2018 K 3.4>3.1>3.0>3.1>3.2>3.2>3.3>4.1  01/24/2018 Alb 3.3  Lipase 67  01/29-30/2018 Troponin 0.02>0.02>0.02  01/29/2018 EKG: Normal sinus rhythm, Nonspecific T wave abnormality  01/24/2018 KUB: Ileus vs. evolving obstruction involving the distal colon-rectum. 01/24/2018 CT AP: Developing colonic obstruction with focal transition at the rectosigmoid junction region and underlying abnormal rectal wall thickening, enhancement and increased perirectal fat stranding. Abnormal appearance of the ureters and bladder. Developing lytic lesions in the skeletal structures suggestive of skeletal metastasis. Multiple lung nodules. 01/25/2018 Gastrograffin Enema: Progressive long segment narrowing of the rectum causes upstream large and small bowel obstruction, which is concordant with findings on recent CT and colonoscopy.      Functional status and cognitive function:    Ambulates with rolling walker  Status: alert, cooperative, no distress, appears stated age    Diet: GI Lite with Ensure Enlive nutritional supplements at all meals    Code status and advanced care plan: Durable DNR sent with patient  Point of Contact: Luis Lockhart, [phone], Leatha Chandler, [phone], Brother-in-Law    Patient Education:  Patient was educated on the following topics prior to discharge: Hospice    Follow-up:   Follow-up Information     Follow up With Details Comments 215 Jim Ko MD In 3 days  [address] [facility]      Niko Keen MD In 4 weeks [facility] [address] [place] [address]      Moris Rangel MD In 2 weeks Hospital follow up [address]  [phone]              ================================================================  Martha Farfan MD, PGY-1  [facility]  02/01/18 4:33 PM [FreeTextEntry1] : No c/o.\par Exercising regularly.\par Eating healthy, low fat, increased plant based foods.\par c/o chronic neck and back pain. \par Chronic neck pain R>L exacerbates right sided headaches. [de-identified] : Mammogram screening on 11/24/2020.  Benign findings.

## (undated) DEVICE — KIT CLN UP BON SECOURS MARYV

## (undated) DEVICE — KIT OST L12IN FLNG DIA45MM ST TRNSPAR TWO PC MOLD TECHNOLOGY

## (undated) DEVICE — SUTURE PDS II SZ 1 L36IN ABSRB VLT CTXB L48MM 1/2 CIR BLNT ZB371

## (undated) DEVICE — SNARE POLYP M W27MMXL240CM OVL STIFF DISP CAPTIVATOR

## (undated) DEVICE — MEDI-VAC NON-CONDUCTIVE SUCTION TUBING: Brand: CARDINAL HEALTH

## (undated) DEVICE — SUTURE VCRL SZ 0 L18IN ABSRB UD POLYGLACTIN 910 BRAID TIE J912G

## (undated) DEVICE — SUTURE VCRL SZ 3-0 L27IN ABSRB UD L26MM SH 1/2 CIR J416H

## (undated) DEVICE — CATH IV SAFE STR 22GX1IN BLU -- PROTECTIV PLUS

## (undated) DEVICE — (D)GLOVE SURG ORTH 7.5 PWD LTX -- DISC BY MFR USE ITEM 278014

## (undated) DEVICE — Device

## (undated) DEVICE — TABLE COVER: Brand: CONVERTORS

## (undated) DEVICE — INTENDED FOR TISSUE SEPARATION, AND OTHER PROCEDURES THAT REQUIRE A SHARP SURGICAL BLADE TO PUNCTURE OR CUT.: Brand: BARD-PARKER SAFETY BLADES SIZE 10, STERILE

## (undated) DEVICE — SLIM BODY SKIN STAPLER: Brand: APPOSE ULC

## (undated) DEVICE — SMOKE EVACUATION PENCIL: Brand: VALLEYLAB

## (undated) DEVICE — SUTURE ETHLN SZ 2-0 L18IN NONABSORBABLE BLK L19MM PS-2 PRIM 593H

## (undated) DEVICE — TRAY CATH OD16FR SIL URIN M STATLOK STBL DEV SURSTP

## (undated) DEVICE — PACK PROCEDURE SURG MAJ W/ BASIN LF

## (undated) DEVICE — SUTURE VCRL SZ 0 L36IN ABSRB UD L36MM CT-1 1/2 CIR J946H

## (undated) DEVICE — BLADE ELECTRODE: Brand: EDGE

## (undated) DEVICE — 3M™ BAIR PAWS FLEX™ WARMING GOWN, STANDARD, 20 PER CASE 81003: Brand: BAIR PAWS™

## (undated) DEVICE — SUT SLK 2-0SH 30IN BLK --

## (undated) DEVICE — FLEX ADVANTAGE 1500CC: Brand: FLEX ADVANTAGE

## (undated) DEVICE — SHEET, T, LAPAROTOMY, STERILE: Brand: MEDLINE

## (undated) DEVICE — KIT IV STRT W CHLORAPREP PD 1ML

## (undated) DEVICE — THREE-QUARTER SHEET: Brand: CONVERTORS

## (undated) DEVICE — MAYO STAND COVER: Brand: CONVERTORS

## (undated) DEVICE — BLADE ASSEMB CLP HAIR FINE --

## (undated) DEVICE — STAPLER WITH DST SERIES TECHNOLOGY: Brand: GIA

## (undated) DEVICE — AIRLIFE™ NASAL OXYGEN CANNULA CURVED, NONFLARED TIP WITH 14 FOOT (4.3 M) CRUSH-RESISTANT TUBING, OVER-THE-EAR STYLE: Brand: AIRLIFE™

## (undated) DEVICE — LOADING UNIT WITH DST SERIES TECHNOLOGY: Brand: GIA

## (undated) DEVICE — SYR IRR CATH TIP LR ADPT 70ML -- CONVERT TO ITEM 363120

## (undated) DEVICE — SUT CHRMC 3-0 27IN SH BRN --

## (undated) DEVICE — FLEX ADVANTAGE 3000CC: Brand: FLEX ADVANTAGE

## (undated) DEVICE — Z DISCONTINUED SUGG SUB 2622703 KIT OST L12IN FLNG DIA70MM ST TRNSPAR TWO PC MOLD

## (undated) DEVICE — REM POLYHESIVE ADULT PATIENT RETURN ELECTRODE: Brand: VALLEYLAB

## (undated) DEVICE — SUT SLK 3-0 30IN SH BLK --